# Patient Record
Sex: FEMALE | Race: WHITE | NOT HISPANIC OR LATINO
[De-identification: names, ages, dates, MRNs, and addresses within clinical notes are randomized per-mention and may not be internally consistent; named-entity substitution may affect disease eponyms.]

---

## 2020-10-11 ENCOUNTER — TRANSCRIPTION ENCOUNTER (OUTPATIENT)
Age: 45
End: 2020-10-11

## 2020-10-19 ENCOUNTER — APPOINTMENT (OUTPATIENT)
Dept: GASTROENTEROLOGY | Facility: CLINIC | Age: 45
End: 2020-10-19

## 2021-02-23 ENCOUNTER — APPOINTMENT (OUTPATIENT)
Dept: GASTROENTEROLOGY | Facility: CLINIC | Age: 46
End: 2021-02-23
Payer: MEDICARE

## 2021-02-23 VITALS
BODY MASS INDEX: 20.43 KG/M2 | HEIGHT: 62 IN | WEIGHT: 111 LBS | DIASTOLIC BLOOD PRESSURE: 70 MMHG | TEMPERATURE: 97 F | OXYGEN SATURATION: 98 % | HEART RATE: 78 BPM | SYSTOLIC BLOOD PRESSURE: 100 MMHG

## 2021-02-23 PROCEDURE — 99203 OFFICE O/P NEW LOW 30 MIN: CPT

## 2021-02-23 NOTE — ASSESSMENT
[FreeTextEntry1] : 45F with Down syndrome living in a group home, celiac disease complicated by duodenal stricture s/p gastrojejunostomy in 1997.\par Advised that the least invasive next step would be an upper endoscopy with steroid injection and metal stent placement, as well as repeat biopsies of the small bowel. Discussed that this would not be a permanent solution but would hopefully provide relief of symptoms for a longer interval. If this fails, patient may ultimately require surgery.

## 2021-02-23 NOTE — PHYSICAL EXAM
[General Appearance - Alert] : alert [General Appearance - In No Acute Distress] : in no acute distress [Sclera] : the sclera and conjunctiva were normal [Extraocular Movements] : extraocular movements were intact [Outer Ear] : the ears and nose were normal in appearance [Oropharynx] : the oropharynx was normal [Neck Appearance] : the appearance of the neck was normal [] : no respiratory distress [Respiration, Rhythm And Depth] : normal respiratory rhythm and effort [Exaggerated Use Of Accessory Muscles For Inspiration] : no accessory muscle use [Heart Rate And Rhythm] : heart rate was normal and rhythm regular [Heart Sounds] : normal S1 and S2 [Bowel Sounds] : normal bowel sounds [Abdomen Soft] : soft [FreeTextEntry1] : tender to palpation in LLQ and RLQ [No CVA Tenderness] : no ~M costovertebral angle tenderness [No Spinal Tenderness] : no spinal tenderness [Abnormal Walk] : normal gait [Musculoskeletal - Swelling] : no joint swelling seen [Skin Color & Pigmentation] : normal skin color and pigmentation [Skin Turgor] : normal skin turgor [Oriented To Time, Place, And Person] : oriented to person, place, and time

## 2021-02-23 NOTE — HISTORY OF PRESENT ILLNESS
[de-identified] : 45F with Down syndrome living in a group home, celiac disease complicated by duodenal stricture s/p gastrojejunostomy in 1997 (required revision in 2001 and 2007) presents for a second opinion. Her father provides most of her medical history. Pt has had multiple GI physicians in the past but has been following with Dr. Lb Glass for the past 3 years. THe patient has required serial dilations about every 3 months but recently the dilations do not provide as much benefit or for as long. It is described that by the evening every night, the patient has bilateral abdominal pain which results in meal avoidance. As a result, she has lost about 30 pounds in the last year and a half. Her last dilation of the gastrojejunal anastomosis was about 6 weeks ago on 12/22/20.\par Patient follows a gluten free diet. She however still has a bowel movement just minutes after any meal, resulting in about 6-7 BMs daily.  She takes carafate before meals.\par Pt denies fever, chills, dysphagia, odynophagia, heartburn, regurgitation, diarrhea, constipation, or melena.

## 2021-03-02 ENCOUNTER — NON-APPOINTMENT (OUTPATIENT)
Age: 46
End: 2021-03-02

## 2021-03-18 ENCOUNTER — APPOINTMENT (OUTPATIENT)
Dept: GASTROENTEROLOGY | Facility: CLINIC | Age: 46
End: 2021-03-18
Payer: MEDICARE

## 2021-03-18 DIAGNOSIS — K31.1 ADULT HYPERTROPHIC PYLORIC STENOSIS: ICD-10-CM

## 2021-03-18 DIAGNOSIS — Z01.812 ENCOUNTER FOR PREPROCEDURAL LABORATORY EXAMINATION: ICD-10-CM

## 2021-03-18 PROCEDURE — 99213 OFFICE O/P EST LOW 20 MIN: CPT

## 2021-03-19 PROBLEM — Z01.812 PRE-PROCEDURE LAB EXAM: Status: ACTIVE | Noted: 2021-03-19

## 2021-03-26 ENCOUNTER — APPOINTMENT (OUTPATIENT)
Dept: DISASTER EMERGENCY | Facility: CLINIC | Age: 46
End: 2021-03-26

## 2021-03-26 ENCOUNTER — LABORATORY RESULT (OUTPATIENT)
Age: 46
End: 2021-03-26

## 2021-03-28 RX ORDER — SODIUM CHLORIDE 9 MG/ML
500 INJECTION INTRAMUSCULAR; INTRAVENOUS; SUBCUTANEOUS
Refills: 0 | Status: DISCONTINUED | OUTPATIENT
Start: 2021-03-29 | End: 2021-04-12

## 2021-03-29 ENCOUNTER — OUTPATIENT (OUTPATIENT)
Dept: OUTPATIENT SERVICES | Facility: HOSPITAL | Age: 46
LOS: 1 days | Discharge: HOME | End: 2021-03-29
Payer: MEDICARE

## 2021-03-29 ENCOUNTER — APPOINTMENT (OUTPATIENT)
Dept: GASTROENTEROLOGY | Facility: HOSPITAL | Age: 46
End: 2021-03-29

## 2021-03-29 VITALS
SYSTOLIC BLOOD PRESSURE: 95 MMHG | HEART RATE: 60 BPM | OXYGEN SATURATION: 97 % | HEIGHT: 62 IN | RESPIRATION RATE: 12 BRPM | DIASTOLIC BLOOD PRESSURE: 61 MMHG | TEMPERATURE: 98 F | WEIGHT: 108.03 LBS

## 2021-03-29 VITALS
OXYGEN SATURATION: 98 % | SYSTOLIC BLOOD PRESSURE: 95 MMHG | DIASTOLIC BLOOD PRESSURE: 54 MMHG | HEART RATE: 76 BPM | RESPIRATION RATE: 18 BRPM

## 2021-03-29 DIAGNOSIS — K90.0 CELIAC DISEASE: ICD-10-CM

## 2021-03-29 DIAGNOSIS — Z98.89 OTHER SPECIFIED POSTPROCEDURAL STATES: Chronic | ICD-10-CM

## 2021-03-29 DIAGNOSIS — Q21.1 ATRIAL SEPTAL DEFECT: Chronic | ICD-10-CM

## 2021-03-29 LAB — HCG UR QL: NEGATIVE — SIGNIFICANT CHANGE UP

## 2021-03-29 PROCEDURE — 43266 EGD ENDOSCOPIC STENT PLACE: CPT

## 2021-03-29 RX ORDER — SODIUM CHLORIDE 9 MG/ML
500 INJECTION, SOLUTION INTRAVENOUS
Refills: 0 | Status: COMPLETED | OUTPATIENT
Start: 2021-03-29 | End: 2021-03-29

## 2021-03-29 RX ADMIN — SODIUM CHLORIDE 30 MILLILITER(S): 9 INJECTION, SOLUTION INTRAVENOUS at 07:51

## 2021-03-29 NOTE — ASU PATIENT PROFILE, ADULT - PSH
ASD (atrial septal defect)  ASD repair Oct 2001  H/O hernia repair  2002, 2009 with mesh  History of colon surgery  revision, gastro jejunostomy and lysis of adhesions  History of colon surgery  duodeno [id number]  S/P tonsillectomy

## 2021-03-29 NOTE — ASU PATIENT PROFILE, ADULT - PMH
Acquired subluxation of left patella, sequela    Acute depression    Afferent loop syndrome    Anxiety    ASD (atrial septal defect)    Celiac disease    Cyst of ovary, unspecified laterality    Down syndrome    Duodenitis    Gastritis    Gilbert's syndrome    Gluten free diet    History of pseudoseizure    Hypothyroid    Iron deficiency anemia, unspecified iron deficiency anemia type    Lactose intolerance    Macrocytic anemia    MRSA (methicillin resistant Staphylococcus aureus)  MSSA/MRSA detected in Nose culture  Obsessive compulsive disorder    Sleep disturbance    Syncope, unspecified syncope type

## 2021-04-13 ENCOUNTER — NON-APPOINTMENT (OUTPATIENT)
Age: 46
End: 2021-04-13

## 2021-04-20 ENCOUNTER — APPOINTMENT (OUTPATIENT)
Dept: GASTROENTEROLOGY | Facility: HOSPITAL | Age: 46
End: 2021-04-20

## 2021-04-23 ENCOUNTER — NON-APPOINTMENT (OUTPATIENT)
Age: 46
End: 2021-04-23

## 2021-04-26 ENCOUNTER — NON-APPOINTMENT (OUTPATIENT)
Age: 46
End: 2021-04-26

## 2021-04-28 ENCOUNTER — APPOINTMENT (OUTPATIENT)
Dept: DISASTER EMERGENCY | Facility: CLINIC | Age: 46
End: 2021-04-28

## 2021-04-28 DIAGNOSIS — Z01.818 ENCOUNTER FOR OTHER PREPROCEDURAL EXAMINATION: ICD-10-CM

## 2021-04-28 LAB — SARS-COV-2 N GENE NPH QL NAA+PROBE: NOT DETECTED

## 2021-04-29 RX ORDER — CIPROFLOXACIN LACTATE 400MG/40ML
400 VIAL (ML) INTRAVENOUS ONCE
Refills: 0 | Status: DISCONTINUED | OUTPATIENT
Start: 2021-04-30 | End: 2021-05-14

## 2021-04-29 RX ORDER — INDOMETHACIN 50 MG
100 CAPSULE ORAL ONCE
Refills: 0 | Status: DISCONTINUED | OUTPATIENT
Start: 2021-04-30 | End: 2021-05-14

## 2021-04-29 RX ORDER — SODIUM CHLORIDE 9 MG/ML
1000 INJECTION, SOLUTION INTRAVENOUS ONCE
Refills: 0 | Status: DISCONTINUED | OUTPATIENT
Start: 2021-04-30 | End: 2021-05-14

## 2021-04-30 ENCOUNTER — RESULT REVIEW (OUTPATIENT)
Age: 46
End: 2021-04-30

## 2021-04-30 ENCOUNTER — OUTPATIENT (OUTPATIENT)
Dept: OUTPATIENT SERVICES | Facility: HOSPITAL | Age: 46
LOS: 1 days | Discharge: HOME | End: 2021-04-30
Payer: MEDICARE

## 2021-04-30 ENCOUNTER — APPOINTMENT (OUTPATIENT)
Dept: GASTROENTEROLOGY | Facility: HOSPITAL | Age: 46
End: 2021-04-30

## 2021-04-30 VITALS
HEART RATE: 71 BPM | RESPIRATION RATE: 18 BRPM | DIASTOLIC BLOOD PRESSURE: 57 MMHG | SYSTOLIC BLOOD PRESSURE: 92 MMHG | OXYGEN SATURATION: 100 %

## 2021-04-30 VITALS
TEMPERATURE: 99 F | DIASTOLIC BLOOD PRESSURE: 62 MMHG | RESPIRATION RATE: 16 BRPM | SYSTOLIC BLOOD PRESSURE: 93 MMHG | WEIGHT: 102.07 LBS | HEART RATE: 64 BPM | OXYGEN SATURATION: 100 % | HEIGHT: 62 IN

## 2021-04-30 DIAGNOSIS — Z98.89 OTHER SPECIFIED POSTPROCEDURAL STATES: Chronic | ICD-10-CM

## 2021-04-30 DIAGNOSIS — Q21.1 ATRIAL SEPTAL DEFECT: Chronic | ICD-10-CM

## 2021-04-30 DIAGNOSIS — R11.0 NAUSEA: ICD-10-CM

## 2021-04-30 LAB — HCG UR QL: NEGATIVE — SIGNIFICANT CHANGE UP

## 2021-04-30 PROCEDURE — 43239 EGD BIOPSY SINGLE/MULTIPLE: CPT | Mod: 59

## 2021-04-30 PROCEDURE — 43247 EGD REMOVE FOREIGN BODY: CPT

## 2021-04-30 PROCEDURE — 88305 TISSUE EXAM BY PATHOLOGIST: CPT | Mod: 26

## 2021-04-30 RX ORDER — SODIUM CHLORIDE 9 MG/ML
500 INJECTION, SOLUTION INTRAVENOUS
Refills: 0 | Status: COMPLETED | OUTPATIENT
Start: 2021-04-30 | End: 2021-04-30

## 2021-04-30 RX ADMIN — SODIUM CHLORIDE 30 MILLILITER(S): 9 INJECTION, SOLUTION INTRAVENOUS at 07:58

## 2021-04-30 NOTE — ASU PATIENT PROFILE, ADULT - PSH
ASD (atrial septal defect)  ASD repair Oct 2001  H/O hernia repair  2002, 2009 with mesh  History of colon surgery  revision, gastro jejunostomy and lysis of adhesions  History of colon surgery  duodeno [id number]  S/P tonsillectomy

## 2021-05-05 NOTE — ASSESSMENT
[FreeTextEntry1] : Plan for EGD to better understand anatomy\par Plan for stent if feasible\par It is not clear whether it would be best to revise the entire surgery\par This will depend on what we see.

## 2021-05-05 NOTE — HISTORY OF PRESENT ILLNESS
[Home] : at home, [unfilled] , at the time of the visit. [Medical Office: ([facility])___] : at the medical office located in  [Parents] : parents [Verbal consent obtained from patient] : the patient, [unfilled] [FreeTextEntry1] : 45 year old female with down syndrome and severe celiac disease who has undergone multiple gastric surgeries. Presumably this was done for possible GP. She had a loop GJ, which was then converted and she has at least two enteroenterostomies. These have been severely stenosed and required dilation - 30X over the last 2 years. \par She has been referred for a possible. stent. I have been reviewing anatomy from UGI series as well as with Dr Glass to better understand this patient's anatomical stenosis. This year she has been losing about 1 lbs weekly.

## 2021-05-06 LAB — SURGICAL PATHOLOGY STUDY: SIGNIFICANT CHANGE UP

## 2021-05-13 ENCOUNTER — APPOINTMENT (OUTPATIENT)
Dept: GASTROENTEROLOGY | Facility: CLINIC | Age: 46
End: 2021-05-13

## 2021-05-27 ENCOUNTER — APPOINTMENT (OUTPATIENT)
Dept: GASTROENTEROLOGY | Facility: CLINIC | Age: 46
End: 2021-05-27
Payer: MEDICARE

## 2021-05-27 VITALS
SYSTOLIC BLOOD PRESSURE: 100 MMHG | BODY MASS INDEX: 19.14 KG/M2 | RESPIRATION RATE: 14 BRPM | HEART RATE: 62 BPM | HEIGHT: 62 IN | DIASTOLIC BLOOD PRESSURE: 70 MMHG | TEMPERATURE: 98 F | WEIGHT: 104 LBS | OXYGEN SATURATION: 98 %

## 2021-05-27 DIAGNOSIS — K91.89 OTHER POSTPROCEDURAL COMPLICATIONS AND DISORDERS OF DIGESTIVE SYSTEM: ICD-10-CM

## 2021-05-27 PROCEDURE — 99214 OFFICE O/P EST MOD 30 MIN: CPT

## 2021-06-24 NOTE — ASSESSMENT
[FreeTextEntry1] : Patient had stent removed for complex post surgical stricturing\par Patient did not tolerate stent well because of diarrhea which responded to Imodium\par Biopsies of small bowel show very active celiac disease\par i discussed these results with the father today because this is likely the cause of the diarrhea and not the stent\par I advised that the weight loss and her symptoms are due to many factors but that the celiac needs to be dealt with better\par They will see Dr. farnaz moya with whom they have a relationship established

## 2021-06-24 NOTE — HISTORY OF PRESENT ILLNESS
[FreeTextEntry1] : 45 year old female with down syndrome and severe celiac disease who has undergone multiple gastric surgeries. Presumably this was done for possible GP. She had a loop GJ, which was then converted and she has at least two enteroenterostomies. These have been severely stenosed and required dilation - 30X over the last 2 years. \par Underwent stent for complex surgical stricturing\par patient then complained that while she could eat she was having diarrhea\par Recently decision made to remove stent and biopsies taken of distal small bowel since patient was acting as if she had malabsorption\par Biopsies show active celiac disease despite father saying he is watching her diet carefully\par

## 2021-06-24 NOTE — PHYSICAL EXAM
[General Appearance - Alert] : alert [General Appearance - In No Acute Distress] : in no acute distress [General Appearance - Well-Appearing] : healthy appearing [FreeTextEntry1] : features f down syndrome [Sclera] : the sclera and conjunctiva were normal [Neck Appearance] : the appearance of the neck was normal [Exaggerated Use Of Accessory Muscles For Inspiration] : no accessory muscle use [Apical Impulse] : the apical impulse was normal [Heart Sounds] : normal S1 and S2 [Bowel Sounds] : normal bowel sounds [Abdomen Soft] : soft [Cervical Lymph Nodes Enlarged Posterior Bilaterally] : posterior cervical [Cervical Lymph Nodes Enlarged Anterior Bilaterally] : anterior cervical [No CVA Tenderness] : no ~M costovertebral angle tenderness [No Spinal Tenderness] : no spinal tenderness [Nail Clubbing] : no clubbing  or cyanosis of the fingernails [] : no rash [No Focal Deficits] : no focal deficits [Oriented To Time, Place, And Person] : oriented to person, place, and time [Impaired Insight] : insight and judgment were intact [Affect] : the affect was normal

## 2021-08-11 ENCOUNTER — APPOINTMENT (OUTPATIENT)
Dept: GASTROENTEROLOGY | Facility: CLINIC | Age: 46
End: 2021-08-11
Payer: MEDICARE

## 2021-08-11 ENCOUNTER — LABORATORY RESULT (OUTPATIENT)
Age: 46
End: 2021-08-11

## 2021-08-11 VITALS
HEIGHT: 62 IN | OXYGEN SATURATION: 98 % | WEIGHT: 114 LBS | HEART RATE: 66 BPM | TEMPERATURE: 96.6 F | DIASTOLIC BLOOD PRESSURE: 60 MMHG | BODY MASS INDEX: 20.98 KG/M2 | SYSTOLIC BLOOD PRESSURE: 90 MMHG

## 2021-08-11 DIAGNOSIS — K91.30 OTHER POSTPROCEDURAL COMPLICATIONS AND DISORDERS OF DIGESTIVE SYSTEM: ICD-10-CM

## 2021-08-11 DIAGNOSIS — R10.9 UNSPECIFIED ABDOMINAL PAIN: ICD-10-CM

## 2021-08-11 DIAGNOSIS — K91.89 OTHER POSTPROCEDURAL COMPLICATIONS AND DISORDERS OF DIGESTIVE SYSTEM: ICD-10-CM

## 2021-08-11 PROCEDURE — 99215 OFFICE O/P EST HI 40 MIN: CPT | Mod: 25

## 2021-08-11 PROCEDURE — 99205 OFFICE O/P NEW HI 60 MIN: CPT | Mod: 25

## 2021-08-11 PROCEDURE — 36415 COLL VENOUS BLD VENIPUNCTURE: CPT

## 2021-08-11 RX ORDER — MULTIVIT-MIN/IRON/FOLIC ACID/K 18-600-40
CAPSULE ORAL
Refills: 0 | Status: ACTIVE | COMMUNITY

## 2021-08-11 RX ORDER — MIDODRINE HYDROCHLORIDE 10 MG/1
10 TABLET ORAL
Refills: 0 | Status: ACTIVE | COMMUNITY

## 2021-08-11 RX ORDER — SUCRALFATE 1 G/10ML
1 SUSPENSION ORAL 3 TIMES DAILY
Qty: 3 | Refills: 1 | Status: DISCONTINUED | COMMUNITY
Start: 2021-04-13 | End: 2021-08-11

## 2021-08-12 NOTE — ASSESSMENT
[FreeTextEntry1] : Patient with Down syndrome and a diagnosis of celiac disease made 15 years ago.  Recent pathology showed moderate villous blunting with slightly increased intraepithelial lymphocytes and increased inflammatory cells in the lamina propria.  Although efforts are made for the patient to be on a strict gluten-free diet, there does appear to be significant risk of contact and cross-contamination at the patient's group home.She has had multiple gastric surgeries and repeated dilatations of a gastrojejunostomy stricture.\par \par I had a long discussion with the patient and her father regarding celiac disease, the gluten-free diet, the concepts of contact and cross contamination, and the potential complications of celiac disease. We also discussed the need for ongoing followup.\par \par Bloodwork was sent for CBC, chem-pack, TSH, celiac markers, iron studies, B12, folate, vitamin A, vitamin D, vitamin K, magnesium, carotene, vitamin B6, zinc, and celiac HLA testing.\par \par Patient was sent for a bone density scan.\par \par I have provided the patient with Zegerid 20 mg twice daily and Carafate 10 cc 4 times daily given the recent finding of a small ulcer at the surgical anastomosis.\par \par I explained to the patient that I do not have expertise when it comes to dilatation and stenting or advanced endoscopy.  I deferred management decisions and intervention regarding this to Dr. Guadarrama.

## 2021-08-12 NOTE — CONSULT LETTER
[FreeTextEntry1] : Dear Dr. Sue Aguilar,\par \[facility] I had the pleasure of seeing your patient RICARDO FORTUNE in the office today.  My office note is attached. PLEASE READ THE "ASSESSMENT" SECTION OF THE NOTE TO SEE MY IMPRESSION AND PLAN.\par \par Thank you very much for allowing me to participate in the care of your patient.\par \[facility] Sincerely,\[facility] \par Robin Colorado M.D., FAC, [facility] Director, Celiac Program at [facility]  of Medicine\[facility] [employer] at [facility] Practice Director,\[facility] - Gastroenterology/Internal Medicine at [address]\[place] [address]\[facility] Tel: [phone]\[facility] Email: [email or web address] \[facility] \[facility] The attached note has been created using a voice recognition system (Dragon).  There may be some misspellings and typos.  Please call my office if you have any issues or questions.

## 2021-08-12 NOTE — HISTORY OF PRESENT ILLNESS
[FreeTextEntry1] : The patient is a 45-year-old woman with Down syndrome who was seen with her father.  She was diagnosed with celiac disease 15 years ago by Dr. Noel.  I do not have the initial studies at which time the diagnosis was made.  The patient had a duodenal stricture which required gastrojejunostomy in 1997.  This has been revised on 2 occasions the patient has had recurrent stricturing at the gastrojejunal anastomosis for which she has undergone revealed dilatations approximately every 3 months.  She has seen multiple gastroenterologists for this.  Most recently, she had a stent placed by Dr. March in March but this was removed on April 30, 2021 due to diarrhea.  At the time of that endoscopy, small bowel biopsies were taken which showed moderate villous blunting with increased inflammatory cells in the lamina propria and slightly increased intraepithelial lymphocytes.  The patient has subsequently been seen by Dr. Guadarrama at [facility], who performed an enteroscopy on July 8, 2021 with dilatation.  At that time note was made of scalloped and atrophic appearing villi in the small bowel and an area of ulceration in the gastric body.  The patient was hospitalized on July 14, 2021 for hematemesis.  Another EGD was performed on July 15, 2021 which showed a 3 mm clean-based ulceration in the beginning of the gastrojejunostomy.  The patient is currently on Zegerid 20 mg twice daily.  She was on Carafate but this has been stopped. Dr. Guadarrama has recommended endoscopic gastrojejunostomy as an alternative to recurrent dilatations.\par \par As far as the celiac disease goes, no blood work has been done in some time.  The patient lives in a group home where they are aware of the need for a gluten-free diet but it does appear that there is significant room for contact and cross-contamination.  The patient spends a good deal of time at her father's house, where he reports a strict gluten-free diet.  The patient's mother has passed away.  She complains of right sided abdominal pain.  She has recently gained weight and is not currently vomiting although she vomits frequently as she develops obstruction.  The patient has 4 solid bowel movements a day.  She denies melena or bright red blood per rectum.

## 2021-08-12 NOTE — REASON FOR VISIT
[Initial Evaluation] : an initial evaluation [Parent] : parent [FreeTextEntry1] : Celiac disease, abdominal pain, gastric ulcer, small bowel anastomotic stricture

## 2021-08-12 NOTE — PHYSICAL EXAM
[General Appearance - Alert] : alert [General Appearance - In No Acute Distress] : in no acute distress [Neck Appearance] : the appearance of the neck was normal [Neck Cervical Mass (___cm)] : no neck mass was observed [Jugular Venous Distention Increased] : there was no jugular-venous distention [Thyroid Nodule] : there were no palpable thyroid nodules [Thyroid Diffuse Enlargement] : the thyroid was not enlarged [Auscultation Breath Sounds / Voice Sounds] : lungs were clear to auscultation bilaterally [Heart Rate And Rhythm] : heart rate was normal and rhythm regular [Heart Sounds] : normal S1 and S2 [Heart Sounds Gallop] : no gallops [Murmurs] : no murmurs [Heart Sounds Pericardial Friction Rub] : no pericardial rub [Edema] : there was no peripheral edema [Bowel Sounds] : normal bowel sounds [Abdomen Soft] : soft [] : no hepato-splenomegaly [Abdomen Mass (___ Cm)] : no abdominal mass palpated [No CVA Tenderness] : no ~M costovertebral angle tenderness [No Spinal Tenderness] : no spinal tenderness [Oriented To Time, Place, And Person] : oriented to person, place, and time [Impaired Insight] : insight and judgment were intact [Affect] : the affect was normal [FreeTextEntry1] : mild diffuse tenderness

## 2021-08-18 ENCOUNTER — NON-APPOINTMENT (OUTPATIENT)
Age: 46
End: 2021-08-18

## 2021-08-18 LAB
25(OH)D3 SERPL-MCNC: 24.9 NG/ML
ALBUMIN SERPL ELPH-MCNC: 3.5 G/DL
ALP BLD-CCNC: 143 U/L
ALT SERPL-CCNC: 33 U/L
ANION GAP SERPL CALC-SCNC: 10 MMOL/L
AST SERPL-CCNC: 35 U/L
BASOPHILS # BLD AUTO: 0.05 K/UL
BASOPHILS NFR BLD AUTO: 2.4 %
BILIRUB SERPL-MCNC: 0.5 MG/DL
BUN SERPL-MCNC: 9 MG/DL
CALCIUM SERPL-MCNC: 8.5 MG/DL
CHLORIDE SERPL-SCNC: 104 MMOL/L
CO2 SERPL-SCNC: 30 MMOL/L
CREAT SERPL-MCNC: 0.61 MG/DL
ENDOMYSIUM IGA SER QL: NEGATIVE
ENDOMYSIUM IGA TITR SER: NORMAL
EOSINOPHIL # BLD AUTO: 0.08 K/UL
EOSINOPHIL NFR BLD AUTO: 3.8 %
FERRITIN SERPL-MCNC: 33 NG/ML
FOLATE SERPL-MCNC: >20 NG/ML
GGT SERPL-CCNC: 8 U/L
GLIADIN IGA SER QL: 71.7 UNITS
GLIADIN IGG SER QL: <5 UNITS
GLIADIN PEPTIDE IGA SER-ACNC: POSITIVE
GLIADIN PEPTIDE IGG SER-ACNC: NEGATIVE
GLUCOSE SERPL-MCNC: 90 MG/DL
HCT VFR BLD CALC: 36.2 %
HGB BLD-MCNC: 11 G/DL
IGA SER QL IEP: 886 MG/DL
IMM GRANULOCYTES NFR BLD AUTO: 0.5 %
IRON SATN MFR SERPL: 8 %
IRON SERPL-MCNC: 29 UG/DL
LYMPHOCYTES # BLD AUTO: 1.01 K/UL
LYMPHOCYTES NFR BLD AUTO: 48.1 %
MAGNESIUM SERPL-MCNC: 2.3 MG/DL
MAN DIFF?: NORMAL
MCHC RBC-ENTMCNC: 30.4 GM/DL
MCHC RBC-ENTMCNC: 33.7 PG
MCV RBC AUTO: 111 FL
MENADIONE SERPL-MCNC: 0.1 NG/ML
MONOCYTES # BLD AUTO: 0.38 K/UL
MONOCYTES NFR BLD AUTO: 18.1 %
NEUTROPHILS # BLD AUTO: 0.57 K/UL
NEUTROPHILS NFR BLD AUTO: 27.1 %
PLATELET # BLD AUTO: 287 K/UL
POTASSIUM SERPL-SCNC: 3.3 MMOL/L
PROT SERPL-MCNC: 6.6 G/DL
RBC # BLD: 3.26 M/UL
RBC # FLD: 14 %
SODIUM SERPL-SCNC: 145 MMOL/L
TIBC SERPL-MCNC: 382 UG/DL
TSH SERPL-ACNC: 26 UIU/ML
TTG IGA SER IA-ACNC: 9.1 U/ML
TTG IGA SER-ACNC: ABNORMAL
TTG IGG SER IA-ACNC: 1.2 U/ML
TTG IGG SER IA-ACNC: NEGATIVE
UIBC SERPL-MCNC: 352 UG/DL
VIT A SERPL-MCNC: 24.4 UG/DL
VIT B12 SERPL-MCNC: 381 PG/ML
VIT B6 SERPL-MCNC: 68.5 UG/L
WBC # FLD AUTO: 2.1 K/UL
ZINC SERPL-MCNC: 55 UG/DL

## 2021-08-23 LAB — BETA CAROTENE: 2 UG/DL

## 2021-08-24 ENCOUNTER — NON-APPOINTMENT (OUTPATIENT)
Age: 46
End: 2021-08-24

## 2021-08-27 RX ORDER — CHOLECALCIFEROL (VITAMIN D3) 25 MCG
25 MCG TABLET ORAL DAILY
Qty: 90 | Refills: 0 | Status: ACTIVE | COMMUNITY
Start: 2021-08-27 | End: 1900-01-01

## 2021-08-30 ENCOUNTER — NON-APPOINTMENT (OUTPATIENT)
Age: 46
End: 2021-08-30

## 2021-10-08 ENCOUNTER — NON-APPOINTMENT (OUTPATIENT)
Age: 46
End: 2021-10-08

## 2021-11-18 ENCOUNTER — NON-APPOINTMENT (OUTPATIENT)
Age: 46
End: 2021-11-18

## 2022-01-04 ENCOUNTER — NON-APPOINTMENT (OUTPATIENT)
Age: 47
End: 2022-01-04

## 2022-03-18 ENCOUNTER — NON-APPOINTMENT (OUTPATIENT)
Age: 47
End: 2022-03-18

## 2022-03-29 RX ORDER — ELECTROLYTES/DEXTROSE
SOLUTION, ORAL ORAL
Qty: 2 | Refills: 3 | Status: ACTIVE | COMMUNITY
Start: 2022-03-29 | End: 1900-01-01

## 2022-04-27 ENCOUNTER — RX RENEWAL (OUTPATIENT)
Age: 47
End: 2022-04-27

## 2022-05-03 ENCOUNTER — NON-APPOINTMENT (OUTPATIENT)
Age: 47
End: 2022-05-03

## 2022-05-23 ENCOUNTER — APPOINTMENT (OUTPATIENT)
Dept: GASTROENTEROLOGY | Facility: CLINIC | Age: 47
End: 2022-05-23
Payer: MEDICARE

## 2022-06-10 ENCOUNTER — APPOINTMENT (OUTPATIENT)
Dept: GASTROENTEROLOGY | Facility: CLINIC | Age: 47
End: 2022-06-10
Payer: MEDICARE

## 2022-06-10 ENCOUNTER — LABORATORY RESULT (OUTPATIENT)
Age: 47
End: 2022-06-10

## 2022-06-10 VITALS
WEIGHT: 120 LBS | DIASTOLIC BLOOD PRESSURE: 60 MMHG | SYSTOLIC BLOOD PRESSURE: 90 MMHG | TEMPERATURE: 96.9 F | HEART RATE: 66 BPM | HEIGHT: 62 IN | OXYGEN SATURATION: 98 % | BODY MASS INDEX: 22.08 KG/M2

## 2022-06-10 DIAGNOSIS — K25.9 GASTRIC ULCER, UNSPECIFIED AS ACUTE OR CHRONIC, W/OUT HEMORRHAGE OR PERFORATION: ICD-10-CM

## 2022-06-10 DIAGNOSIS — R74.8 ABNORMAL LEVELS OF OTHER SERUM ENZYMES: ICD-10-CM

## 2022-06-10 PROCEDURE — 36415 COLL VENOUS BLD VENIPUNCTURE: CPT

## 2022-06-10 PROCEDURE — 99215 OFFICE O/P EST HI 40 MIN: CPT | Mod: 25

## 2022-06-10 RX ORDER — OMEPRAZOLE 40 MG/1
40 CAPSULE, DELAYED RELEASE ORAL
Refills: 0 | Status: ACTIVE | COMMUNITY

## 2022-06-10 RX ORDER — OMEPRAZOLE AND SODIUM BICARBONATE 20; 1100 MG/1; MG/1
20-1100 CAPSULE ORAL
Refills: 0 | Status: DISCONTINUED | COMMUNITY
End: 2022-06-10

## 2022-06-10 RX ORDER — PANTOPRAZOLE 40 MG/1
40 TABLET, DELAYED RELEASE ORAL
Qty: 90 | Refills: 1 | Status: DISCONTINUED | COMMUNITY
Start: 2022-04-01 | End: 2022-06-10

## 2022-06-10 RX ORDER — OMEPRAZOLE, SODIUM BICARBONATE 20; 1100 MG/1; MG/1
20-1100 CAPSULE ORAL TWICE DAILY
Qty: 180 | Refills: 1 | Status: DISCONTINUED | COMMUNITY
Start: 2021-08-11 | End: 2022-06-10

## 2022-06-12 NOTE — HISTORY OF PRESENT ILLNESS
[FreeTextEntry1] : The patient has Down syndrome and celiac disease.  She lives in a group home where she is reportedly on a gluten-free diet.  She is seeing me for the first time since August 11, 2021.  At that time, blood work revealed abnormal celiac markers with tissue transglutaminase IgA weakly positive at 9.1 with normal IgG and positive anti-deamidated gliadin IgA of 71.7.  The patient had borderline iron deficiency anemia, elevated alkaline phosphatase, decreased vitamin D, B12, and carotene levels.  After this visit, the patient saw my dietitian and efforts were made to ensure strict gluten-free diet at her group home.\par \par Since then, the patient was hospitalized at [facility] from April 26 to May 2 with hematemesis and abdominal pain.  She had 3 endoscopies by Dr. Elliott Peng and received blood transfusions.  She was found to have gastric and duodenal ulcers as well as stenosis of the gastroenterostomy in the gastric body which was dilated to 18 mm.  There appeared to be a stenosis of the jejunal jejunal anastomosis.  It is unclear if small bowel biopsies were taken during these procedures.\par \par The patient is currently on omeprazole 40 mg twice daily and Carafate twice daily as well as B12, vitamin D, and beta-carotene supplementation.  She gets pain in the abdomen bilaterally but does not vomit.  She has 4 solid bowel movements a day without melena.  She had a small amount of blood on 1 occasion which was likely related to hemorrhoids.\par \par \par Note from 8/11/2021 - The patient is a 45-year-old woman with Down syndrome who was seen with her father.  She was diagnosed with celiac disease 15 years ago by Dr. Noel.  I do not have the initial studies at which time the diagnosis was made.  The patient had a duodenal stricture which required gastrojejunostomy in 1997.  This has been revised on 2 occasions the patient has had recurrent stricturing at the gastrojejunal anastomosis for which she has undergone revealed dilatations approximately every 3 months.  She has seen multiple gastroenterologists for this.  Most recently, she had a stent placed by Dr. March in March but this was removed on April 30, 2021 due to diarrhea.  At the time of that endoscopy, small bowel biopsies were taken which showed moderate villous blunting with increased inflammatory cells in the lamina propria and slightly increased intraepithelial lymphocytes.  The patient has subsequently been seen by Dr. Guadarrama at [facility], who performed an enteroscopy on July 8, 2021 with dilatation.  At that time note was made of scalloped and atrophic appearing villi in the small bowel and an area of ulceration in the gastric body.  The patient was hospitalized on July 14, 2021 for hematemesis.  Another EGD was performed on July 15, 2021 which showed a 3 mm clean-based ulceration in the beginning of the gastrojejunostomy.  The patient is currently on Zegerid 20 mg twice daily.  She was on Carafate but this has been stopped. Dr. Guadarrama has recommended endoscopic gastrojejunostomy as an alternative to recurrent dilatations.\par \par As far as the celiac disease goes, no blood work has been done in some time.  The patient lives in a group home where they are aware of the need for a gluten-free diet but it does appear that there is significant room for contact and cross-contamination.  The patient spends a good deal of time at her father's house, where he reports a strict gluten-free diet.  The patient's mother has passed away.  She complains of right sided abdominal pain.  She has recently gained weight and is not currently vomiting although she vomits frequently as she develops obstruction.  The patient has 4 solid bowel movements a day.  She denies melena or bright red blood per rectum.

## 2022-06-12 NOTE — ASSESSMENT
[FreeTextEntry1] : Patient with Down syndrome and celiac disease who had mildly abnormal celiac serologies last summer.  She is status post hospitalization for GI bleed with gastric and duodenal ulcers.  She has a complicated history with multiple gastric surgeries and dilatations of strictures.  She had an elevated alkaline phosphatase last summer.  After the last visit, my dietitian and I made significant efforts to assure that the patient was provided with a gluten-free environment at her group home.\par \par Bloodwork was sent for CBC, chem-pack, TSH, celiac markers, iron studies, B12, folate, vitamin A, vitamin D, vitamin K, magnesium, carotene, vitamin B6, zinc, copper, vitamin B1, vitamin B2.  PT, alpha-1 antitrypsin, alpha-fetoprotein, KAVON, ANCA, ceruloplasmin, iron studies, GGTP, celiac markers, hepatitis A., B., C. serologies, lipid profile, AMA, anti-smooth muscle antibody, anti-LKM antibody, anti-soluble liver antigen antibody, and alkaline phosphatase isoenzymes.\par \par I am deferring endoscopic evaluations to Dr. Peng.  I have asked the patient to get me any biopsy results from the previous endoscopies and to ask that multiple small bowel biopsies be taken at the time of future endoscopy.\par \par \par Plan from 8/11/2021 - Patient with Down syndrome and a diagnosis of celiac disease made 15 years ago.  Recent pathology showed moderate villous blunting with slightly increased intraepithelial lymphocytes and increased inflammatory cells in the lamina propria.  Although efforts are made for the patient to be on a strict gluten-free diet, there does appear to be significant risk of contact and cross-contamination at the patient's group home.She has had multiple gastric surgeries and repeated dilatations of a gastrojejunostomy stricture.\par \par I had a long discussion with the patient and her father regarding celiac disease, the gluten-free diet, the concepts of contact and cross contamination, and the potential complications of celiac disease. We also discussed the need for ongoing followup.\par \par Bloodwork was sent for CBC, chem-pack, TSH, celiac markers, iron studies, B12, folate, vitamin A, vitamin D, vitamin K, magnesium, carotene, vitamin B6, zinc, and celiac HLA testing.\par \par Patient was sent for a bone density scan.\par \par I have provided the patient with Zegerid 20 mg twice daily and Carafate 10 cc 4 times daily given the recent finding of a small ulcer at the surgical anastomosis.\par \par I explained to the patient that I do not have expertise when it comes to dilatation and stenting or advanced endoscopy.  I deferred management decisions and intervention regarding this to Dr. Guadarrama.

## 2022-06-12 NOTE — PHYSICAL EXAM
[General Appearance - Alert] : alert [General Appearance - In No Acute Distress] : in no acute distress [Neck Appearance] : the appearance of the neck was normal [Neck Cervical Mass (___cm)] : no neck mass was observed [Jugular Venous Distention Increased] : there was no jugular-venous distention [Thyroid Diffuse Enlargement] : the thyroid was not enlarged [Thyroid Nodule] : there were no palpable thyroid nodules [Auscultation Breath Sounds / Voice Sounds] : lungs were clear to auscultation bilaterally [Heart Rate And Rhythm] : heart rate was normal and rhythm regular [Heart Sounds] : normal S1 and S2 [Heart Sounds Gallop] : no gallops [Murmurs] : no murmurs [Heart Sounds Pericardial Friction Rub] : no pericardial rub [Edema] : there was no peripheral edema [Bowel Sounds] : normal bowel sounds [Abdomen Soft] : soft [] : no hepato-splenomegaly [Abdomen Mass (___ Cm)] : no abdominal mass palpated [No CVA Tenderness] : no ~M costovertebral angle tenderness [No Spinal Tenderness] : no spinal tenderness [Oriented To Time, Place, And Person] : oriented to person, place, and time [Impaired Insight] : insight and judgment were intact [Affect] : the affect was normal [FreeTextEntry1] : mild diffuse tenderness

## 2022-06-12 NOTE — CONSULT LETTER
[FreeTextEntry1] : Dear Dr. Sue Aguilar and Dr. Elliott Peng,\par \par I had the pleasure of seeing your patient RICARDO FORTUNE in the office today.  My office note is attached. PLEASE READ THE "ASSESSMENT" SECTION OF THE NOTE TO SEE MY IMPRESSION AND PLAN.\par \par Thank you very much for allowing me to participate in the care of your patient.\par \par Sincerely,\par \par Robin Colorado M.D., FACG, FACP\par Director, Celiac Program at [facility]\par  of Medicine, [employer] at [facility] Adjunct  of Medicine, [facility] Practice Director, [facility] - Gastroenterology at [place]\[facility] [address]\[place] [address]\par Tel: [phone]\par Email: [email or web address]\par \par \par The attached note has been created using a voice recognition system (Dragon).  There may be some misspellings and typos.  Please call my office if you have any issues or questions.

## 2022-06-16 LAB
25(OH)D3 SERPL-MCNC: 22.5 NG/ML
A1AT SERPL-MCNC: 160 MG/DL
AFP-TM SERPL-MCNC: 3.8 NG/ML
ALBUMIN SERPL ELPH-MCNC: 4.1 G/DL
ALP BLD-CCNC: 138 U/L
ALP BLD-CCNC: 141 IU/L
ALP BONE CFR SERPL: 37 %
ALP INTEST CFR SERPL: 11 %
ALP LIVER CFR SERPL: 52 %
ALT SERPL-CCNC: 36 U/L
ANACR T: NEGATIVE
ANION GAP SERPL CALC-SCNC: 14 MMOL/L
AST SERPL-CCNC: 34 U/L
BASOPHILS # BLD AUTO: 0.04 K/UL
BASOPHILS NFR BLD AUTO: 1.5 %
BETA CAROTENE: 15 UG/DL
BILIRUB SERPL-MCNC: 0.6 MG/DL
BUN SERPL-MCNC: 19 MG/DL
CALCIUM SERPL-MCNC: 8.8 MG/DL
CERULOPLASMIN SERPL-MCNC: 9 MG/DL
CHLORIDE SERPL-SCNC: 101 MMOL/L
CHOLEST SERPL-MCNC: 149 MG/DL
CO2 SERPL-SCNC: 25 MMOL/L
COPPER SERPL-MCNC: 38 UG/DL
CREAT SERPL-MCNC: 0.8 MG/DL
EGFR: 92 ML/MIN/1.73M2
ENDOMYSIUM IGA SER QL: NEGATIVE
ENDOMYSIUM IGA TITR SER: NORMAL
EOSINOPHIL # BLD AUTO: 0.03 K/UL
EOSINOPHIL NFR BLD AUTO: 1.1 %
FERRITIN SERPL-MCNC: 33 NG/ML
FOLATE SERPL-MCNC: >20 NG/ML
GGT SERPL-CCNC: 8 U/L
GLIADIN IGA SER QL: 53.8 UNITS
GLIADIN IGG SER QL: <5 UNITS
GLIADIN PEPTIDE IGA SER-ACNC: POSITIVE
GLIADIN PEPTIDE IGG SER-ACNC: NEGATIVE
GLUCOSE SERPL-MCNC: 67 MG/DL
HBV CORE IGG+IGM SER QL: NONREACTIVE
HBV SURFACE AB SER QL: NONREACTIVE
HBV SURFACE AG SER QL: NONREACTIVE
HCT VFR BLD CALC: 40.9 %
HCV AB SER QL: NONREACTIVE
HCV S/CO RATIO: 0.21 S/CO
HDLC SERPL-MCNC: 66 MG/DL
HEPATITIS A IGG ANTIBODY: NONREACTIVE
HGB BLD-MCNC: 12.9 G/DL
IGA SER QL IEP: 1091 MG/DL
IGG SER QL IEP: 785 MG/DL
IMM GRANULOCYTES NFR BLD AUTO: 0 %
INR PPP: 0.94 RATIO
IRON SATN MFR SERPL: 12 %
IRON SERPL-MCNC: 52 UG/DL
LDLC SERPL CALC-MCNC: 63 MG/DL
LKM AB SER QL IF: <20.1 UNITS
LYMPHOCYTES # BLD AUTO: 0.8 K/UL
LYMPHOCYTES NFR BLD AUTO: 29.1 %
MAGNESIUM SERPL-MCNC: 2.2 MG/DL
MAN DIFF?: NORMAL
MCHC RBC-ENTMCNC: 31.5 GM/DL
MCHC RBC-ENTMCNC: 32.1 PG
MCV RBC AUTO: 101.7 FL
MITOCHONDRIA AB SER IF-ACNC: NORMAL
MONOCYTES # BLD AUTO: 0.32 K/UL
MONOCYTES NFR BLD AUTO: 11.6 %
NEUTROPHILS # BLD AUTO: 1.56 K/UL
NEUTROPHILS NFR BLD AUTO: 56.7 %
NONHDLC SERPL-MCNC: 83 MG/DL
PLATELET # BLD AUTO: 287 K/UL
POTASSIUM SERPL-SCNC: 4.5 MMOL/L
PROT SERPL-MCNC: 7.2 G/DL
PT BLD: 11.1 SEC
RBC # BLD: 4.02 M/UL
RBC # FLD: 17.9 %
SELENIUM SERPL-MCNC: 141 UG/L
SMOOTH MUSCLE AB SER QL IF: NORMAL
SODIUM SERPL-SCNC: 140 MMOL/L
SOLUBLE LIVER IGG SER IA-ACNC: 2.4
TIBC SERPL-MCNC: 427 UG/DL
TRIGL SERPL-MCNC: 98 MG/DL
TSH SERPL-ACNC: 46.3 UIU/ML
TTG IGA SER IA-ACNC: 9.3 U/ML
TTG IGA SER-ACNC: ABNORMAL
TTG IGG SER IA-ACNC: <1.2 U/ML
TTG IGG SER IA-ACNC: NEGATIVE
UIBC SERPL-MCNC: 376 UG/DL
VIT B1 SERPL-MCNC: 187.3 NMOL/L
VIT B12 SERPL-MCNC: 1433 PG/ML
WBC # FLD AUTO: 2.75 K/UL
ZINC SERPL-MCNC: 74 UG/DL

## 2022-06-17 LAB
VIT A SERPL-MCNC: 52.4 UG/DL
VIT B2 SERPL-MCNC: 382 UG/L

## 2022-06-20 LAB — VIT B6 SERPL-MCNC: 127.6 UG/L

## 2022-06-22 LAB — MENADIONE SERPL-MCNC: <0.1 NG/ML

## 2022-06-23 ENCOUNTER — NON-APPOINTMENT (OUTPATIENT)
Age: 47
End: 2022-06-23

## 2022-06-29 ENCOUNTER — NON-APPOINTMENT (OUTPATIENT)
Age: 47
End: 2022-06-29

## 2022-07-06 ENCOUNTER — NON-APPOINTMENT (OUTPATIENT)
Age: 47
End: 2022-07-06

## 2022-08-29 ENCOUNTER — NON-APPOINTMENT (OUTPATIENT)
Age: 47
End: 2022-08-29

## 2022-09-21 ENCOUNTER — NON-APPOINTMENT (OUTPATIENT)
Age: 47
End: 2022-09-21

## 2022-09-22 ENCOUNTER — LABORATORY RESULT (OUTPATIENT)
Age: 47
End: 2022-09-22

## 2022-10-05 ENCOUNTER — NON-APPOINTMENT (OUTPATIENT)
Age: 47
End: 2022-10-05

## 2022-10-05 LAB
25(OH)D3 SERPL-MCNC: 22.7 NG/ML
ALBUMIN SERPL ELPH-MCNC: 3.9 G/DL
ALP BLD-CCNC: 141 IU/L
ALP BLD-CCNC: 144 U/L
ALP BONE CFR SERPL: 31 %
ALP INTEST CFR SERPL: 10 %
ALP LIVER CFR SERPL: 59 %
ALT SERPL-CCNC: 47 U/L
ANION GAP SERPL CALC-SCNC: 13 MMOL/L
AST SERPL-CCNC: 40 U/L
BASOPHILS # BLD AUTO: 0.04 K/UL
BASOPHILS NFR BLD AUTO: 1.1 %
BETA CAROTENE: 4 UG/DL
BILIRUB SERPL-MCNC: 0.7 MG/DL
BUN SERPL-MCNC: 15 MG/DL
CALCIUM SERPL-MCNC: 8.7 MG/DL
CHLORIDE SERPL-SCNC: 107 MMOL/L
CO2 SERPL-SCNC: 25 MMOL/L
COPPER SERPL-MCNC: 99 UG/DL
CREAT SERPL-MCNC: 0.73 MG/DL
EGFR: 103 ML/MIN/1.73M2
ENDOMYSIUM IGA SER QL: NEGATIVE
ENDOMYSIUM IGA TITR SER: NORMAL
EOSINOPHIL # BLD AUTO: 0.05 K/UL
EOSINOPHIL NFR BLD AUTO: 1.4 %
FERRITIN SERPL-MCNC: 23 NG/ML
FOLATE SERPL-MCNC: >20 NG/ML
GGT SERPL-CCNC: 8 U/L
GLIADIN IGA SER QL: 76 UNITS
GLIADIN IGG SER QL: <5 UNITS
GLIADIN PEPTIDE IGA SER-ACNC: POSITIVE
GLIADIN PEPTIDE IGG SER-ACNC: NEGATIVE
GLUCOSE SERPL-MCNC: 72 MG/DL
HCT VFR BLD CALC: 41.4 %
HGB BLD-MCNC: 13.3 G/DL
IGA SER QL IEP: 926 MG/DL
IMM GRANULOCYTES NFR BLD AUTO: 0 %
IRON SATN MFR SERPL: 13 %
IRON SERPL-MCNC: 49 UG/DL
LYMPHOCYTES # BLD AUTO: 0.8 K/UL
LYMPHOCYTES NFR BLD AUTO: 22.6 %
MAGNESIUM SERPL-MCNC: 1.9 MG/DL
MAN DIFF?: NORMAL
MCHC RBC-ENTMCNC: 32.1 GM/DL
MCHC RBC-ENTMCNC: 34.9 PG
MCV RBC AUTO: 108.7 FL
MENADIONE SERPL-MCNC: <0.1 NG/ML
MONOCYTES # BLD AUTO: 0.4 K/UL
MONOCYTES NFR BLD AUTO: 11.3 %
NEUTROPHILS # BLD AUTO: 2.25 K/UL
NEUTROPHILS NFR BLD AUTO: 63.6 %
PLATELET # BLD AUTO: 258 K/UL
POTASSIUM SERPL-SCNC: 3.4 MMOL/L
PROT SERPL-MCNC: 6.8 G/DL
RBC # BLD: 3.81 M/UL
RBC # FLD: 16.7 %
SELENIUM SERPL-MCNC: 142 UG/L
SODIUM SERPL-SCNC: 145 MMOL/L
TIBC SERPL-MCNC: 378 UG/DL
TSH SERPL-ACNC: 34 UIU/ML
TTG IGA SER IA-ACNC: 13.6 U/ML
TTG IGA SER-ACNC: POSITIVE
TTG IGG SER IA-ACNC: <1.2 U/ML
TTG IGG SER IA-ACNC: NEGATIVE
UIBC SERPL-MCNC: 328 UG/DL
VIT A SERPL-MCNC: 28.7 UG/DL
VIT B1 SERPL-MCNC: 167.4 NMOL/L
VIT B12 SERPL-MCNC: 1635 PG/ML
VIT B2 SERPL-MCNC: 269 UG/L
VIT B6 SERPL-MCNC: 90.7 UG/L
WBC # FLD AUTO: 3.54 K/UL
ZINC SERPL-MCNC: 60 UG/DL

## 2022-12-22 ENCOUNTER — NON-APPOINTMENT (OUTPATIENT)
Age: 47
End: 2022-12-22

## 2023-01-11 ENCOUNTER — INPATIENT (INPATIENT)
Facility: HOSPITAL | Age: 48
LOS: 4 days | Discharge: NURSING, REHAB OR LONG-TERM CARE | DRG: 125 | End: 2023-01-16
Attending: STUDENT IN AN ORGANIZED HEALTH CARE EDUCATION/TRAINING PROGRAM | Admitting: STUDENT IN AN ORGANIZED HEALTH CARE EDUCATION/TRAINING PROGRAM
Payer: MEDICARE

## 2023-01-11 VITALS
OXYGEN SATURATION: 95 % | WEIGHT: 138.89 LBS | SYSTOLIC BLOOD PRESSURE: 95 MMHG | HEART RATE: 69 BPM | HEIGHT: 62 IN | TEMPERATURE: 99 F | RESPIRATION RATE: 18 BRPM | DIASTOLIC BLOOD PRESSURE: 64 MMHG

## 2023-01-11 DIAGNOSIS — Z98.89 OTHER SPECIFIED POSTPROCEDURAL STATES: Chronic | ICD-10-CM

## 2023-01-11 DIAGNOSIS — Q21.1 ATRIAL SEPTAL DEFECT: Chronic | ICD-10-CM

## 2023-01-11 LAB
ALBUMIN SERPL ELPH-MCNC: 3.3 G/DL — SIGNIFICANT CHANGE UP (ref 3.3–5)
ALP SERPL-CCNC: 157 U/L — HIGH (ref 40–120)
ALT FLD-CCNC: 32 U/L — SIGNIFICANT CHANGE UP (ref 10–45)
ANION GAP SERPL CALC-SCNC: 13 MMOL/L — SIGNIFICANT CHANGE UP (ref 5–17)
AST SERPL-CCNC: 28 U/L — SIGNIFICANT CHANGE UP (ref 10–40)
BASOPHILS # BLD AUTO: 0.03 K/UL — SIGNIFICANT CHANGE UP (ref 0–0.2)
BASOPHILS NFR BLD AUTO: 0.3 % — SIGNIFICANT CHANGE UP (ref 0–2)
BILIRUB SERPL-MCNC: 0.7 MG/DL — SIGNIFICANT CHANGE UP (ref 0.2–1.2)
BUN SERPL-MCNC: 15 MG/DL — SIGNIFICANT CHANGE UP (ref 7–23)
CALCIUM SERPL-MCNC: 9.4 MG/DL — SIGNIFICANT CHANGE UP (ref 8.4–10.5)
CHLORIDE SERPL-SCNC: 96 MMOL/L — SIGNIFICANT CHANGE UP (ref 96–108)
CO2 SERPL-SCNC: 30 MMOL/L — SIGNIFICANT CHANGE UP (ref 22–31)
CREAT SERPL-MCNC: 0.77 MG/DL — SIGNIFICANT CHANGE UP (ref 0.5–1.3)
EGFR: 96 ML/MIN/1.73M2 — SIGNIFICANT CHANGE UP
EOSINOPHIL # BLD AUTO: 0 K/UL — SIGNIFICANT CHANGE UP (ref 0–0.5)
EOSINOPHIL NFR BLD AUTO: 0 % — SIGNIFICANT CHANGE UP (ref 0–6)
FLUAV AG NPH QL: SIGNIFICANT CHANGE UP
FLUBV AG NPH QL: SIGNIFICANT CHANGE UP
GLUCOSE SERPL-MCNC: 125 MG/DL — HIGH (ref 70–99)
HCT VFR BLD CALC: 40.9 % — SIGNIFICANT CHANGE UP (ref 34.5–45)
HGB BLD-MCNC: 13.5 G/DL — SIGNIFICANT CHANGE UP (ref 11.5–15.5)
IMM GRANULOCYTES NFR BLD AUTO: 0.4 % — SIGNIFICANT CHANGE UP (ref 0–0.9)
LYMPHOCYTES # BLD AUTO: 1.03 K/UL — SIGNIFICANT CHANGE UP (ref 1–3.3)
LYMPHOCYTES # BLD AUTO: 11.5 % — LOW (ref 13–44)
MCHC RBC-ENTMCNC: 33 GM/DL — SIGNIFICANT CHANGE UP (ref 32–36)
MCHC RBC-ENTMCNC: 33.5 PG — SIGNIFICANT CHANGE UP (ref 27–34)
MCV RBC AUTO: 101.5 FL — HIGH (ref 80–100)
MONOCYTES # BLD AUTO: 1.06 K/UL — HIGH (ref 0–0.9)
MONOCYTES NFR BLD AUTO: 11.9 % — SIGNIFICANT CHANGE UP (ref 2–14)
NEUTROPHILS # BLD AUTO: 6.77 K/UL — SIGNIFICANT CHANGE UP (ref 1.8–7.4)
NEUTROPHILS NFR BLD AUTO: 75.9 % — SIGNIFICANT CHANGE UP (ref 43–77)
NRBC # BLD: 0 /100 WBCS — SIGNIFICANT CHANGE UP (ref 0–0)
PLATELET # BLD AUTO: 321 K/UL — SIGNIFICANT CHANGE UP (ref 150–400)
POTASSIUM SERPL-MCNC: 3.7 MMOL/L — SIGNIFICANT CHANGE UP (ref 3.5–5.3)
POTASSIUM SERPL-SCNC: 3.7 MMOL/L — SIGNIFICANT CHANGE UP (ref 3.5–5.3)
PROT SERPL-MCNC: 7.7 G/DL — SIGNIFICANT CHANGE UP (ref 6–8.3)
RBC # BLD: 4.03 M/UL — SIGNIFICANT CHANGE UP (ref 3.8–5.2)
RBC # FLD: 15.2 % — HIGH (ref 10.3–14.5)
RSV RNA NPH QL NAA+NON-PROBE: SIGNIFICANT CHANGE UP
SARS-COV-2 RNA SPEC QL NAA+PROBE: SIGNIFICANT CHANGE UP
SODIUM SERPL-SCNC: 139 MMOL/L — SIGNIFICANT CHANGE UP (ref 135–145)
WBC # BLD: 8.93 K/UL — SIGNIFICANT CHANGE UP (ref 3.8–10.5)
WBC # FLD AUTO: 8.93 K/UL — SIGNIFICANT CHANGE UP (ref 3.8–10.5)

## 2023-01-11 PROCEDURE — 71045 X-RAY EXAM CHEST 1 VIEW: CPT | Mod: 26

## 2023-01-11 PROCEDURE — 99285 EMERGENCY DEPT VISIT HI MDM: CPT | Mod: GC

## 2023-01-11 PROCEDURE — 70481 CT ORBIT/EAR/FOSSA W/DYE: CPT | Mod: 26,MA

## 2023-01-11 RX ORDER — AMPICILLIN SODIUM AND SULBACTAM SODIUM 250; 125 MG/ML; MG/ML
INJECTION, POWDER, FOR SUSPENSION INTRAMUSCULAR; INTRAVENOUS
Refills: 0 | Status: DISCONTINUED | OUTPATIENT
Start: 2023-01-11 | End: 2023-01-15

## 2023-01-11 RX ORDER — AMPICILLIN SODIUM AND SULBACTAM SODIUM 250; 125 MG/ML; MG/ML
3 INJECTION, POWDER, FOR SUSPENSION INTRAMUSCULAR; INTRAVENOUS EVERY 6 HOURS
Refills: 0 | Status: DISCONTINUED | OUTPATIENT
Start: 2023-01-12 | End: 2023-01-15

## 2023-01-11 RX ORDER — AMPICILLIN SODIUM AND SULBACTAM SODIUM 250; 125 MG/ML; MG/ML
3 INJECTION, POWDER, FOR SUSPENSION INTRAMUSCULAR; INTRAVENOUS ONCE
Refills: 0 | Status: COMPLETED | OUTPATIENT
Start: 2023-01-11 | End: 2023-01-11

## 2023-01-11 RX ORDER — ACETAMINOPHEN 500 MG
650 TABLET ORAL ONCE
Refills: 0 | Status: COMPLETED | OUTPATIENT
Start: 2023-01-11 | End: 2023-01-11

## 2023-01-11 RX ORDER — VANCOMYCIN HCL 1 G
1000 VIAL (EA) INTRAVENOUS ONCE
Refills: 0 | Status: COMPLETED | OUTPATIENT
Start: 2023-01-11 | End: 2023-01-11

## 2023-01-11 RX ADMIN — AMPICILLIN SODIUM AND SULBACTAM SODIUM 200 GRAM(S): 250; 125 INJECTION, POWDER, FOR SUSPENSION INTRAMUSCULAR; INTRAVENOUS at 23:01

## 2023-01-11 RX ADMIN — Medication 650 MILLIGRAM(S): at 23:01

## 2023-01-11 NOTE — ED PROVIDER NOTE - PHYSICAL EXAMINATION
GENERAL: Awake, alert, NAD  HEENT: Purulent green discharge of the right eye extraocular motions intact  ABDOMEN: Soft, , non tender, non distended, no rebound, no guarding  NEURO: A&Ox3. Moving all extremities.  SKIN: Warm and dry. No rash.  PSYCH: Normal affect.

## 2023-01-11 NOTE — ED PROVIDER NOTE - WR ORDER ID 1
From: Merlin Boros  To: Avelino Gallegos MD  Sent: 10/27/2020 1:10 PM CDT  Subject: Non-Urgent [facility] Dr. Murtaza Rene,    I may have been exposed to Covid.  Can I schedule a test? I tried to schedule at [facility] and [facility], but they don't hav
[id number]

## 2023-01-11 NOTE — CONSULT NOTE ADULT - SUBJECTIVE AND OBJECTIVE BOX
CC: Right Maxillary sinusitis.    HPI: 48 YO F with PMH of Downs syndrome, Duodenitis/Gastritis , Gilbert's syndrome, Hypothyroid, FE anemia, OCS, Ovarian cyst, ASD sp repair, Anxiety, Celiac dz presenting for Right  periorbital swelling of 2 days duration. History obtained from father at bedside. Noted swelling started 2 days ago, went to PCP and was prescribed Zpak. Swelling did not improve and pt went to see ophthalmologist Dr. Kayla Clinton today who diagnosed pt with preseptal cellulitis. ENT was consulted for evaluation of Right Maxillary sinus opacification found on CT Orbits. Pt reports runny nose, nasal congestion for past 2 weeks. Pt denies fever/ chills, cough, sore throat, facial pain/pressure, CP/SOB/Palpitations/Diaphoresis, HA/Dizziness/Blurry vision, fever/chills, Abdominal Pain/N/V/D, recent travel/sick contacts.     PAST MEDICAL & SURGICAL HISTORY:  Down syndrome  Celiac disease  Hypothyroid  ASD (atrial septal defect)  MRSA (methicillin resistant Staphylococcus aureus)  MSSA/MRSA detected in Nose culture  Lactose intolerance  Macrocytic anemia  Acquired subluxation of left patella, sequela  Gilbert&#x27;s syndrome  Iron deficiency anemia, unspecified iron deficiency anemia type  Cyst of ovary, unspecified laterality  Sleep disturbance  Obsessive compulsive disorder  History of pseudoseizure  Duodenitis  Syncope, unspecified syncope type  Gastritis  Afferent loop syndrome  Anxiety  Acute depression  Gluten free diet  S/P tonsillectomy  ASD (atrial septal defect)  ASD repair Oct 2001  H/O hernia repair  2002, 2009 with mesh  History of colon surgery  duodeno [id number]  History of colon surgery  revision, gastro jejunostomy and lysis of adhesions    Allergies  Celiac (Unknown)  No Known Drug Allergies    Intolerances    MEDICATIONS  (STANDING):  ampicillin/sulbactam  IVPB      ampicillin/sulbactam  IVPB 3 Gram(s) IV Intermittent every 6 hours  dexamethasone/neomycin/polymyxin Ointment 1 Application(s) Right EYE three times a day  dorzolamide 2%/timolol 0.5% Ophthalmic Solution 1 Drop(s) Right EYE every 12 hours    MEDICATIONS  (PRN):    Social History:     Family history:   Family history of Alzheimer&#x27;s disease (Mother)  No family history of COPD (Mother)    ROS:   ENT: all negative except as noted in HPI   CV: denies palpitations  Pulm: denies SOB, cough, hemoptysis  GI: denies change in apetite, indigestion, n/v  : denies pertinent urinary symptoms, urgency  Neuro: denies numbness/tingling, loss of sensation  Psych: denies anxiety  MS: denies muscle weakness, instability  Heme: denies easy bruising or bleeding  Endo: denies heat/cold intolerance, excessive sweating  Vascular: denies LE edema    Vital Signs Last 24 Hrs  T(C): 36.5 (12 Jan 2023 01:13), Max: 37.2 (11 Jan 2023 17:36)  T(F): 97.7 (12 Jan 2023 01:13), Max: 99 (11 Jan 2023 17:36)  HR: 94 (12 Jan 2023 01:13) (69 - 94)  BP: 115/66 (12 Jan 2023 01:13) (95/64 - 115/66)  BP(mean): --  RR: 20 (12 Jan 2023 01:13) (18 - 21)  SpO2: 94% (12 Jan 2023 01:13) (94% - 100%)  Parameters below as of 12 Jan 2023 01:13  Patient On (Oxygen Delivery Method): room air                        13.5   8.93  )-----------( 321      ( 11 Jan 2023 19:57 )             40.9    01-11    139  |  96  |  15  ----------------------------<  125<H>  3.7   |  30  |  0.77    Ca    9.4      11 Jan 2023 19:57  TPro  7.7  /  Alb  3.3  /  TBili  0.7  /  DBili  x   /  AST  28  /  ALT  32  /  AlkPhos  157<H>  01-11       PHYSICAL EXAM:  Gen: NAD, On RA.   Skin: No rashes, bruises, or lesions  Head: Normocephalic, Atraumatic  Face: no edema, erythema, or fluctuance. Parotid glands soft without mass  Eyes: Right periorbita cellulitis.   Nose: Nares bilaterally patent, no discharge  Mouth: No Stridor / Drooling / Trismus.  Mucosa moist, tongue/uvula midline, oropharynx clear  Neck: Flat, supple, no lymphadenopathy, trachea midline, no masses  Lymphatic: No lymphadenopathy  Resp: breathing easily, no stridor  CV: no peripheral edema/cyanosis  GI: nondistended   Peripheral vascular: no JVD or edema  Neuro: facial nerve intact, no facial droop    Diagnostic Nasal Endoscopy  Indication for procedure: Right Maxillary sinusitis.    "Anterior rhinoscopy insufficient to account for symptoms"    Verbal and/or written consent obtained from patient    Scope #3: flexible fiber optic telescope used with surgilube     Right Anterior nare nasal crusting. Purulent discharge, culture taken. Mucosa moist with scant mucus, normal inferior/middle/superior turbinates, normal inferior/middle/superior meatus, normal fontanelles, maxillary ostia clear, sphenoethmoidal recess clear bilaterally. No polyps noted.     IMAGING/ADDITIONAL STUDIES:   CT Orbits.     OPTIC NERVES: Normal.  LACRIMAL GLANDS:  Normal.  EXTRAOCULAR MUSCLES:  Normal.  VISUALIZED INTRACRANIAL STRUCTURES:   Normal.  VISUALIZED SINUSES:   The right maxillary sinus is opacified. There is   mucosal thickening and large fluid level in the left maxillary sinus.   There is opacification of several ethmoid air cells.  BONES:  Normal.  MISCELLANEOUS:  There is a periapical lucency involving the left   maxillary second molar with associated cortical dehiscence. No overlying   discrete fluid collection.    IMPRESSION: Right periorbital cellulitis with post septal extension as   described above. There is associated mild right nasopharyngeal. No   discrete fluid collection.    There is acute maxillary sinusitis. CC: Right Maxillary sinusitis.    HPI: 48 YO F with PMH of Downs syndrome, Duodenitis/Gastritis , Gilbert's syndrome, Hypothyroid, FE anemia, OCS, Ovarian cyst, ASD sp repair, Anxiety, Celiac dz presenting for Right  periorbital swelling of 2 days duration. History obtained from father at bedside. Noted swelling started 2 days ago, went to PCP and was prescribed Zpak. Swelling did not improve and pt went to see ophthalmologist Dr. Kayla Clinton today who diagnosed pt with preseptal cellulitis. ENT was consulted for evaluation of Right Maxillary sinus opacification found on CT Orbits. Pt reports runny nose, nasal congestion for past 2 weeks. Pt denies fever/ chills, cough, sore throat, facial pain/pressure, CP/SOB/Palpitations/Diaphoresis, HA/Dizziness/Blurry vision, fever/chills, Abdominal Pain/N/V/D, recent travel/sick contacts.     PAST MEDICAL & SURGICAL HISTORY:  Down syndrome  Celiac disease  Hypothyroid  ASD (atrial septal defect)  MRSA (methicillin resistant Staphylococcus aureus)  MSSA/MRSA detected in Nose culture  Lactose intolerance  Macrocytic anemia  Acquired subluxation of left patella, sequela  Gilbert&#x27;s syndrome  Iron deficiency anemia, unspecified iron deficiency anemia type  Cyst of ovary, unspecified laterality  Sleep disturbance  Obsessive compulsive disorder  History of pseudoseizure  Duodenitis  Syncope, unspecified syncope type  Gastritis  Afferent loop syndrome  Anxiety  Acute depression  Gluten free diet  S/P tonsillectomy  ASD (atrial septal defect)  ASD repair Oct 2001  H/O hernia repair  2002, 2009 with mesh  History of colon surgery  duodeno [id number]  History of colon surgery  revision, gastro jejunostomy and lysis of adhesions    Allergies  Celiac (Unknown)  No Known Drug Allergies    Intolerances    MEDICATIONS  (STANDING):  ampicillin/sulbactam  IVPB      ampicillin/sulbactam  IVPB 3 Gram(s) IV Intermittent every 6 hours  dexamethasone/neomycin/polymyxin Ointment 1 Application(s) Right EYE three times a day  dorzolamide 2%/timolol 0.5% Ophthalmic Solution 1 Drop(s) Right EYE every 12 hours    MEDICATIONS  (PRN):    Social History:     Family history:   Family history of Alzheimer&#x27;s disease (Mother)  No family history of COPD (Mother)    ROS:   ENT: all negative except as noted in HPI   CV: denies palpitations  Pulm: denies SOB, cough, hemoptysis  GI: denies change in apetite, indigestion, n/v  : denies pertinent urinary symptoms, urgency  Neuro: denies numbness/tingling, loss of sensation  Psych: denies anxiety  MS: denies muscle weakness, instability  Heme: denies easy bruising or bleeding  Endo: denies heat/cold intolerance, excessive sweating  Vascular: denies LE edema    Vital Signs Last 24 Hrs  T(C): 36.5 (12 Jan 2023 01:13), Max: 37.2 (11 Jan 2023 17:36)  T(F): 97.7 (12 Jan 2023 01:13), Max: 99 (11 Jan 2023 17:36)  HR: 94 (12 Jan 2023 01:13) (69 - 94)  BP: 115/66 (12 Jan 2023 01:13) (95/64 - 115/66)  BP(mean): --  RR: 20 (12 Jan 2023 01:13) (18 - 21)  SpO2: 94% (12 Jan 2023 01:13) (94% - 100%)  Parameters below as of 12 Jan 2023 01:13  Patient On (Oxygen Delivery Method): room air                        13.5   8.93  )-----------( 321      ( 11 Jan 2023 19:57 )             40.9    01-11    139  |  96  |  15  ----------------------------<  125<H>  3.7   |  30  |  0.77    Ca    9.4      11 Jan 2023 19:57  TPro  7.7  /  Alb  3.3  /  TBili  0.7  /  DBili  x   /  AST  28  /  ALT  32  /  AlkPhos  157<H>  01-11       PHYSICAL EXAM:  Gen: NAD, On RA.   Skin: No rashes, bruises, or lesions  Head: Normocephalic, Atraumatic  Face: no edema, erythema, or fluctuance. Parotid glands soft without mass  Eyes: Right periorbita erythema/edema, unable to open eye spontaneously.   Nose: Nares bilaterally patent, no discharge  Mouth: No Stridor / Drooling / Trismus.  Mucosa moist, tongue/uvula midline, oropharynx clear  Neck: Flat, supple, no lymphadenopathy, trachea midline, no masses  Lymphatic: No lymphadenopathy  Resp: breathing easily, no stridor  CV: no peripheral edema/cyanosis  GI: nondistended   Peripheral vascular: no JVD or edema  Neuro: facial nerve intact, no facial droop    Diagnostic Nasal Endoscopy  Indication for procedure: Right Maxillary sinusitis.    "Anterior rhinoscopy insufficient to account for symptoms"    Verbal and/or written consent obtained from patient    Scope #3: flexible fiber optic telescope used with surgilube     Right Anterior nare nasal crusting. ? Purulent discharge, culture taken. Mucosa moist with scant mucus, normal inferior/middle/superior turbinates, normal inferior/middle/superior meatus, normal fontanelles, maxillary ostia clear, sphenoethmoidal recess clear bilaterally. No polyps noted.     IMAGING/ADDITIONAL STUDIES:   CT Orbits.     OPTIC NERVES: Normal.  LACRIMAL GLANDS:  Normal.  EXTRAOCULAR MUSCLES:  Normal.  VISUALIZED INTRACRANIAL STRUCTURES:   Normal.  VISUALIZED SINUSES:   The right maxillary sinus is opacified. There is   mucosal thickening and large fluid level in the left maxillary sinus.   There is opacification of several ethmoid air cells.  BONES:  Normal.  MISCELLANEOUS:  There is a periapical lucency involving the left   maxillary second molar with associated cortical dehiscence. No overlying   discrete fluid collection.    IMPRESSION: Right periorbital cellulitis with post septal extension as   described above. There is associated mild right nasopharyngeal. No   discrete fluid collection.    There is acute maxillary sinusitis.

## 2023-01-11 NOTE — ED PROVIDER NOTE - RAPID ASSESSMENT
Private Physician Sue Aguilar pcp,. Kayla Clinton Optho/[facility] [phone]  47y female pmh Downs syndrome, Duodenitis/Gastritis , Gilbert's syndrome, Hypothyroid, FE anemia, OCS, Ovarian cyst,ASD sp repair, Anxiety Celiac dz Pt lives in group home comes to ed c/o 3d hx of rt eye irritation tx w zithromycin, seen by Private Physician Optho and referred to ed for concerns for preseptal cellulitis. PE WDWN female w obvious downs facies. RT eye lid red swollen w erythema extending to cheek.  Edwin Bryant MD, Facep    PT seen as Triage/Q-doc full evaluation to be performed once pt is transferred to main ED.  Edwin Bryant MD, Face

## 2023-01-11 NOTE — ED PROVIDER NOTE - NSCAREINITIATED _GEN_ER
Chief complaint:   Chief Complaint   Patient presents with   • Fever     seen in ED 17 for breathing problem   • Vomiting       Vitals:  Visit Vitals   • Pulse 106   • Temp 98.7 °F (37.1 °C) (Temporal Artery)   • Resp 22   • Wt 9.33 kg   • SpO2 98%       HISTORY OF PRESENT ILLNESS     HPI   Ju Galvez is a 15 month old  female here today with her mother with concerns for vomiting that started today. Mom states she had an appointment in the clinic and the child started vomiting. She did vomit once this morning after taking her amoxicillin. She was seen in the emergency department 4 days ago and diagnosed with a left otitis media. She has been taking amoxicillin. She states she did have a fever 4 days ago but this has resolved. She has not had any diarrhea. Her last bowel movement was 2 days ago. No history of constipation. She's had nasal drainage and congestion and a mild cough for the past 4 days. She did have some wheezing and was given an albuterol inhaler and mom has been using this twice daily. She did use it this morning. Her activity has been down and she is crying more. She has been sleeping okay at night. Mom has not given her anything for her current symptoms.        Other significant problems:  Patient Active Problem List    Diagnosis Date Noted   • Hyperbilirubinemia,  2016     Priority: Low     Maternal blood type O (+), infant A (+), christiana 1+.  Treated with phototherapy , for ~12 hours.  Peak bili 15 (as of  pm).         PAST MEDICAL, FAMILY AND SOCIAL HISTORY     Medications:  Current Outpatient Prescriptions   Medication   • amoxicillin (AMOXIL) 250 MG/5ML suspension   • albuterol 108 (90 Base) MCG/ACT inhaler   • ondansetron (ZOFRAN ODT) 4 MG disintegrating tablet   • ketoconazole (NIZORAL) 2 % cream   • ibuprofen (MOTRIN,ADVIL) 100 MG/5ML suspension   • Acetaminophen (TYLENOL INFANTS PO)     No current facility-administered medications for this visit.         Allergies:  ALLERGIES:  No Known Allergies    Past Medical  History/Surgeries:  History reviewed. No pertinent past medical history.    History reviewed. No pertinent surgical history.    Family History:  Family History   Problem Relation Age of Onset   • Diabetes Father    • Diabetes Maternal Aunt    • Cancer Maternal Aunt      breast   • Diabetes Maternal Grandfather    • Heart disease Other      MGGM   • Cancer Other      PGGF prostate cancer   • Asthma Neg Hx        Social History:  Social History   Substance Use Topics   • Smoking status: Passive Smoke Exposure - Never Smoker   • Smokeless tobacco: Never Used   • Alcohol use No       REVIEW OF SYSTEMS     Review of Systems  See hpi  PHYSICAL EXAM     Physical Exam   Constitutional: She appears well-developed and well-nourished. She is active, playful and cooperative.  Non-toxic appearance. No distress.   HENT:   Head: Normocephalic and atraumatic.   Right Ear: Tympanic membrane, external ear and canal normal.   Left Ear: External ear and canal normal. A middle ear effusion is present.   Nose: Nose normal. No nasal discharge.   Mouth/Throat: Mucous membranes are moist. No oropharyngeal exudate, pharynx swelling, pharynx erythema or pharynx petechiae. No tonsillar exudate. Oropharynx is clear. Pharynx is normal.   There is fluid present behind the left TM with mild erythema.   Eyes: Conjunctivae are normal. Left eye exhibits no discharge.   Neck: Normal range of motion. Neck supple. No adenopathy.   Cardiovascular: Normal rate and regular rhythm.    No murmur heard.  Pulmonary/Chest: Effort normal and breath sounds normal. No nasal flaring. No respiratory distress. She has no wheezes. She has no rhonchi. She exhibits no retraction.   Abdominal: Soft. Bowel sounds are normal. There is no tenderness.   Neurological: She is alert.   Skin: Skin is warm and dry. No rash noted.   Vitals reviewed.      ASSESSMENT/PLAN     Ju was seen today for fever and  vomiting.    Diagnoses and all orders for this visit:    Non-intractable vomiting with nausea, unspecified vomiting type  -     ondansetron (ZOFRAN ODT) disintegrating tablet 2 mg; Take 0.5 tablets by mouth once.    Left acute suppurative otitis media    She was given a 2 mg Zofran ODT tablet and 2 mg to go home to be taken every 8 hours as needed for nausea and vomiting.  We discussed altering diet.  Starting with clear liquids and advancing to a bland/soft diet as tolerated.  Importance of encouraging small amounts of fluid to maintain hydration.  Child appears well-hydrated at this time.  Finish out the amoxicillin, may restart this evening as long as she is tolerating fluids.  If not improving in 3-5 days, should recheck with her primary care provider.   Richard Helms(Resident)

## 2023-01-11 NOTE — ED PROVIDER NOTE - OBJECTIVE STATEMENT
47-year-old female history of Down syndrome gastritis Guilbert disease anemia celiac's disease chief complaint 3 days of right eye irritation treated with at azithromycin seen by ophthalmology and referred for concern for preseptal cellulitis patient has right eyelid shut with discharge per the father.  Patient notes also pain with moving the eye around.

## 2023-01-11 NOTE — ED PROVIDER NOTE - NSICDXFAMILYHX_GEN_ALL_CORE_FT
FAMILY HISTORY:  Mother  Still living? No  Family history of Alzheimer's disease, Age at diagnosis: Age Unknown  No family history of COPD, Age at diagnosis: Age Unknown

## 2023-01-11 NOTE — ED PROVIDER NOTE - NSICDXPASTSURGICALHX_GEN_ALL_CORE_FT
PAST SURGICAL HISTORY:  ASD (atrial septal defect) ASD repair Oct 2001    H/O hernia repair 2002, 2009 with mesh    History of colon surgery duodeno [id number]    History of colon surgery revision, gastro jejunostomy and lysis of adhesions    S/P tonsillectomy

## 2023-01-11 NOTE — ED PROVIDER NOTE - CLINICAL SUMMARY MEDICAL DECISION MAKING FREE TEXT BOX
47-year-old female multiple comorbidities including Down syndrome celiac disease chief complaint right eye pain/swelling given history and physical concern for preseptal cellulitis will obtain Ortho consult in addition to probable admission for Vanco and Unasyn

## 2023-01-11 NOTE — ED PROVIDER NOTE - NSICDXPASTMEDICALHX_GEN_ALL_CORE_FT
PAST MEDICAL HISTORY:  Acquired subluxation of left patella, sequela     Acute depression     Afferent loop syndrome     Anxiety     ASD (atrial septal defect)     Celiac disease     Cyst of ovary, unspecified laterality     Down syndrome     Duodenitis     Gastritis     Gilbert's syndrome     Gluten free diet     History of pseudoseizure     Hypothyroid     Iron deficiency anemia, unspecified iron deficiency anemia type     Lactose intolerance     Macrocytic anemia     MRSA (methicillin resistant Staphylococcus aureus) MSSA/MRSA detected in Nose culture    Obsessive compulsive disorder     Sleep disturbance     Syncope, unspecified syncope type

## 2023-01-11 NOTE — CONSULT NOTE ADULT - ASSESSMENT
46 YO F with PMH of Downs syndrome, Duodenitis/Gastritis , Gilbert's syndrome, Hypothyroid, FE anemia, OCS, Ovarian cyst, ASD sp repair, Anxiety, Celiac dz presenting for Right  periorbital swelling of 2 days duration. History obtained from father at bedside. Noted swelling started 2 days ago, went to PCP and was prescribed Zpak. Swelling did not improve and pt went to see ophthalmologist Dr. Kayla Clinton today who diagnosed pt with preseptal cellulitis. ENT was consulted for evaluation of Right Maxillary sinus opacification found on CT Orbits. Pt reports runny nose, nasal congestion for past 2 weeks.  48 YO F with PMH of Downs syndrome, Duodenitis/Gastritis , Gilbert's syndrome, Hypothyroid, FE anemia, OCS, Ovarian cyst, ASD sp repair, Anxiety, Celiac dz presenting for Right  periorbital swelling of 2 days duration went to see ophthalmologist today who diagnosed pt with preseptal cellulitis. ENT was consulted for evaluation of Right Maxillary sinus opacification found on CT Orbits. Pt reports runny nose, nasal congestion for past 2 weeks. WBC: 8.93. Afebrile.

## 2023-01-11 NOTE — ED PROVIDER NOTE - ATTENDING CONTRIBUTION TO CARE
attending Bruno: 47yF h/o Down syndrome, gastritis, Gilbert disease, anemia, celiac's disease p/w worsening irritation and swelling to R eye, had been seen outpatient by ophtho and treated with azithromycin, sent in for concern for septal cellulitis. R eye swelling to point where she cannot open eye, +purulent drainage. Pt seen by triage MD in waiting room, CT performed showing post septal cellulitis. Will obtain labs, IV abx, ophtho consult, admission

## 2023-01-11 NOTE — CONSULT NOTE ADULT - PROBLEM SELECTOR RECOMMENDATION 9
- IV Unasyn   - IV Decadron 10mg q8h x3 doses.   - NS 0.65% nasal spray TID x3 weeks.   - Afrin 1 spray to BID x3 days.   - Flonase 1spray b/l BID.   - F/U Bacterial ,Fungal, AFB Cx from sinuses.   - ID consult.   - IVF Hydration.   -  ENT will follow.   - Call with questions.     # [phone]  ENT PA

## 2023-01-12 DIAGNOSIS — R94.31 ABNORMAL ELECTROCARDIOGRAM [ECG] [EKG]: ICD-10-CM

## 2023-01-12 DIAGNOSIS — I95.1 ORTHOSTATIC HYPOTENSION: ICD-10-CM

## 2023-01-12 DIAGNOSIS — J32.0 CHRONIC MAXILLARY SINUSITIS: ICD-10-CM

## 2023-01-12 DIAGNOSIS — E03.9 HYPOTHYROIDISM, UNSPECIFIED: ICD-10-CM

## 2023-01-12 DIAGNOSIS — Z29.9 ENCOUNTER FOR PROPHYLACTIC MEASURES, UNSPECIFIED: ICD-10-CM

## 2023-01-12 DIAGNOSIS — H05.011 CELLULITIS OF RIGHT ORBIT: ICD-10-CM

## 2023-01-12 LAB
ALBUMIN SERPL ELPH-MCNC: 3 G/DL — LOW (ref 3.3–5)
ALP SERPL-CCNC: 134 U/L — HIGH (ref 40–120)
ALT FLD-CCNC: 28 U/L — SIGNIFICANT CHANGE UP (ref 10–45)
ANION GAP SERPL CALC-SCNC: 10 MMOL/L — SIGNIFICANT CHANGE UP (ref 5–17)
APPEARANCE UR: CLEAR — SIGNIFICANT CHANGE UP
AST SERPL-CCNC: 20 U/L — SIGNIFICANT CHANGE UP (ref 10–40)
BACTERIA # UR AUTO: NEGATIVE — SIGNIFICANT CHANGE UP
BILIRUB SERPL-MCNC: 0.6 MG/DL — SIGNIFICANT CHANGE UP (ref 0.2–1.2)
BILIRUB UR-MCNC: NEGATIVE — SIGNIFICANT CHANGE UP
BUN SERPL-MCNC: 11 MG/DL — SIGNIFICANT CHANGE UP (ref 7–23)
CALCIUM SERPL-MCNC: 8.6 MG/DL — SIGNIFICANT CHANGE UP (ref 8.4–10.5)
CHLORIDE SERPL-SCNC: 99 MMOL/L — SIGNIFICANT CHANGE UP (ref 96–108)
CO2 SERPL-SCNC: 31 MMOL/L — SIGNIFICANT CHANGE UP (ref 22–31)
COLOR SPEC: YELLOW — SIGNIFICANT CHANGE UP
CREAT SERPL-MCNC: 0.65 MG/DL — SIGNIFICANT CHANGE UP (ref 0.5–1.3)
CRP SERPL-MCNC: 161 MG/L — HIGH (ref 0–4)
DIFF PNL FLD: ABNORMAL
EGFR: 109 ML/MIN/1.73M2 — SIGNIFICANT CHANGE UP
EPI CELLS # UR: 3 /HPF — SIGNIFICANT CHANGE UP
ERYTHROCYTE [SEDIMENTATION RATE] IN BLOOD: 120 MM/HR — HIGH (ref 0–15)
GLUCOSE SERPL-MCNC: 117 MG/DL — HIGH (ref 70–99)
GLUCOSE UR QL: NEGATIVE — SIGNIFICANT CHANGE UP
HCG SERPL-ACNC: <2 MIU/ML — SIGNIFICANT CHANGE UP
HCT VFR BLD CALC: 36.3 % — SIGNIFICANT CHANGE UP (ref 34.5–45)
HGB BLD-MCNC: 12 G/DL — SIGNIFICANT CHANGE UP (ref 11.5–15.5)
HYALINE CASTS # UR AUTO: 4 /LPF — HIGH (ref 0–2)
KETONES UR-MCNC: NEGATIVE — SIGNIFICANT CHANGE UP
LEUKOCYTE ESTERASE UR-ACNC: NEGATIVE — SIGNIFICANT CHANGE UP
MAGNESIUM SERPL-MCNC: 2.1 MG/DL — SIGNIFICANT CHANGE UP (ref 1.6–2.6)
MCHC RBC-ENTMCNC: 33 PG — SIGNIFICANT CHANGE UP (ref 27–34)
MCHC RBC-ENTMCNC: 33.1 GM/DL — SIGNIFICANT CHANGE UP (ref 32–36)
MCV RBC AUTO: 99.7 FL — SIGNIFICANT CHANGE UP (ref 80–100)
NITRITE UR-MCNC: NEGATIVE — SIGNIFICANT CHANGE UP
NRBC # BLD: 0 /100 WBCS — SIGNIFICANT CHANGE UP (ref 0–0)
PH UR: 6.5 — SIGNIFICANT CHANGE UP (ref 5–8)
PHOSPHATE SERPL-MCNC: 3.6 MG/DL — SIGNIFICANT CHANGE UP (ref 2.5–4.5)
PLATELET # BLD AUTO: 313 K/UL — SIGNIFICANT CHANGE UP (ref 150–400)
POTASSIUM SERPL-MCNC: 3.4 MMOL/L — LOW (ref 3.5–5.3)
POTASSIUM SERPL-SCNC: 3.4 MMOL/L — LOW (ref 3.5–5.3)
PROT SERPL-MCNC: 6.8 G/DL — SIGNIFICANT CHANGE UP (ref 6–8.3)
PROT UR-MCNC: ABNORMAL
RBC # BLD: 3.64 M/UL — LOW (ref 3.8–5.2)
RBC # FLD: 15.2 % — HIGH (ref 10.3–14.5)
RBC CASTS # UR COMP ASSIST: 409 /HPF — HIGH (ref 0–4)
RHEUMATOID FACT SERPL-ACNC: 21 IU/ML — HIGH (ref 0–13)
RHEUMATOID FACT SERPL-ACNC: 23 IU/ML — HIGH (ref 0–13)
SODIUM SERPL-SCNC: 140 MMOL/L — SIGNIFICANT CHANGE UP (ref 135–145)
SP GR SPEC: >1.05 (ref 1.01–1.02)
TSH SERPL-MCNC: 3.35 UIU/ML — SIGNIFICANT CHANGE UP (ref 0.27–4.2)
UROBILINOGEN FLD QL: NEGATIVE — SIGNIFICANT CHANGE UP
WBC # BLD: 6.23 K/UL — SIGNIFICANT CHANGE UP (ref 3.8–10.5)
WBC # FLD AUTO: 6.23 K/UL — SIGNIFICANT CHANGE UP (ref 3.8–10.5)
WBC UR QL: 14 /HPF — HIGH (ref 0–5)

## 2023-01-12 PROCEDURE — 99223 1ST HOSP IP/OBS HIGH 75: CPT

## 2023-01-12 PROCEDURE — 99222 1ST HOSP IP/OBS MODERATE 55: CPT | Mod: FS

## 2023-01-12 PROCEDURE — 99221 1ST HOSP IP/OBS SF/LOW 40: CPT

## 2023-01-12 PROCEDURE — 99222 1ST HOSP IP/OBS MODERATE 55: CPT

## 2023-01-12 RX ORDER — ASCORBIC ACID 60 MG
500 TABLET,CHEWABLE ORAL DAILY
Refills: 0 | Status: DISCONTINUED | OUTPATIENT
Start: 2023-01-12 | End: 2023-01-16

## 2023-01-12 RX ORDER — OXYMETAZOLINE HYDROCHLORIDE 0.5 MG/ML
1 SPRAY NASAL
Refills: 0 | Status: COMPLETED | OUTPATIENT
Start: 2023-01-12 | End: 2023-01-14

## 2023-01-12 RX ORDER — DEXAMETHASONE 0.5 MG/5ML
10 ELIXIR ORAL EVERY 8 HOURS
Refills: 0 | Status: COMPLETED | OUTPATIENT
Start: 2023-01-12 | End: 2023-01-12

## 2023-01-12 RX ORDER — NEOMYCIN/POLYMYXIN B/DEXAMETHA 0.1 %
1 SUSPENSION, DROPS(FINAL DOSAGE FORM)(ML) OPHTHALMIC (EYE) THREE TIMES A DAY
Refills: 0 | Status: DISCONTINUED | OUTPATIENT
Start: 2023-01-12 | End: 2023-01-16

## 2023-01-12 RX ORDER — SACCHAROMYCES BOULARDII 250 MG
250 POWDER IN PACKET (EA) ORAL
Refills: 0 | Status: DISCONTINUED | OUTPATIENT
Start: 2023-01-12 | End: 2023-01-16

## 2023-01-12 RX ORDER — SUCRALFATE 1 G
1 TABLET ORAL
Refills: 0 | Status: DISCONTINUED | OUTPATIENT
Start: 2023-01-12 | End: 2023-01-16

## 2023-01-12 RX ORDER — ERGOCALCIFEROL 1.25 MG/1
50000 CAPSULE ORAL
Refills: 0 | Status: DISCONTINUED | OUTPATIENT
Start: 2023-01-12 | End: 2023-01-16

## 2023-01-12 RX ORDER — LEVOTHYROXINE SODIUM 125 MCG
300 TABLET ORAL DAILY
Refills: 0 | Status: DISCONTINUED | OUTPATIENT
Start: 2023-01-12 | End: 2023-01-16

## 2023-01-12 RX ORDER — PANTOPRAZOLE SODIUM 20 MG/1
40 TABLET, DELAYED RELEASE ORAL
Refills: 0 | Status: DISCONTINUED | OUTPATIENT
Start: 2023-01-12 | End: 2023-01-16

## 2023-01-12 RX ORDER — MIDODRINE HYDROCHLORIDE 2.5 MG/1
10 TABLET ORAL THREE TIMES A DAY
Refills: 0 | Status: DISCONTINUED | OUTPATIENT
Start: 2023-01-12 | End: 2023-01-16

## 2023-01-12 RX ORDER — ENOXAPARIN SODIUM 100 MG/ML
40 INJECTION SUBCUTANEOUS EVERY 24 HOURS
Refills: 0 | Status: DISCONTINUED | OUTPATIENT
Start: 2023-01-12 | End: 2023-01-16

## 2023-01-12 RX ORDER — SODIUM CHLORIDE 0.65 %
1 AEROSOL, SPRAY (ML) NASAL THREE TIMES A DAY
Refills: 0 | Status: DISCONTINUED | OUTPATIENT
Start: 2023-01-12 | End: 2023-01-16

## 2023-01-12 RX ORDER — FOLIC ACID 0.8 MG
1 TABLET ORAL DAILY
Refills: 0 | Status: DISCONTINUED | OUTPATIENT
Start: 2023-01-12 | End: 2023-01-16

## 2023-01-12 RX ORDER — DORZOLAMIDE HYDROCHLORIDE TIMOLOL MALEATE 20; 5 MG/ML; MG/ML
1 SOLUTION/ DROPS OPHTHALMIC EVERY 12 HOURS
Refills: 0 | Status: DISCONTINUED | OUTPATIENT
Start: 2023-01-12 | End: 2023-01-16

## 2023-01-12 RX ORDER — PREGABALIN 225 MG/1
1000 CAPSULE ORAL DAILY
Refills: 0 | Status: DISCONTINUED | OUTPATIENT
Start: 2023-01-12 | End: 2023-01-16

## 2023-01-12 RX ORDER — VANCOMYCIN HCL 1 G
1000 VIAL (EA) INTRAVENOUS EVERY 12 HOURS
Refills: 0 | Status: DISCONTINUED | OUTPATIENT
Start: 2023-01-12 | End: 2023-01-15

## 2023-01-12 RX ADMIN — DORZOLAMIDE HYDROCHLORIDE TIMOLOL MALEATE 1 DROP(S): 20; 5 SOLUTION/ DROPS OPHTHALMIC at 18:48

## 2023-01-12 RX ADMIN — Medication 1 SPRAY(S): at 14:26

## 2023-01-12 RX ADMIN — OXYMETAZOLINE HYDROCHLORIDE 1 SPRAY(S): 0.5 SPRAY NASAL at 17:59

## 2023-01-12 RX ADMIN — ERGOCALCIFEROL 50000 UNIT(S): 1.25 CAPSULE ORAL at 14:17

## 2023-01-12 RX ADMIN — Medication 250 MILLIGRAM(S): at 02:17

## 2023-01-12 RX ADMIN — Medication 300 MICROGRAM(S): at 14:13

## 2023-01-12 RX ADMIN — Medication 102 MILLIGRAM(S): at 07:28

## 2023-01-12 RX ADMIN — Medication 1 APPLICATION(S): at 05:52

## 2023-01-12 RX ADMIN — Medication 102 MILLIGRAM(S): at 23:37

## 2023-01-12 RX ADMIN — Medication 1 APPLICATION(S): at 23:52

## 2023-01-12 RX ADMIN — Medication 250 MILLIGRAM(S): at 14:11

## 2023-01-12 RX ADMIN — Medication 250 MILLIGRAM(S): at 18:47

## 2023-01-12 RX ADMIN — MIDODRINE HYDROCHLORIDE 10 MILLIGRAM(S): 2.5 TABLET ORAL at 12:38

## 2023-01-12 RX ADMIN — Medication 1 MILLIGRAM(S): at 14:17

## 2023-01-12 RX ADMIN — PANTOPRAZOLE SODIUM 40 MILLIGRAM(S): 20 TABLET, DELAYED RELEASE ORAL at 18:03

## 2023-01-12 RX ADMIN — Medication 500 MILLIGRAM(S): at 14:16

## 2023-01-12 RX ADMIN — Medication 1 TABLET(S): at 14:16

## 2023-01-12 RX ADMIN — Medication 102 MILLIGRAM(S): at 14:12

## 2023-01-12 RX ADMIN — Medication 1 GRAM(S): at 17:58

## 2023-01-12 RX ADMIN — AMPICILLIN SODIUM AND SULBACTAM SODIUM 200 GRAM(S): 250; 125 INJECTION, POWDER, FOR SUSPENSION INTRAMUSCULAR; INTRAVENOUS at 23:38

## 2023-01-12 RX ADMIN — PREGABALIN 1000 MICROGRAM(S): 225 CAPSULE ORAL at 14:16

## 2023-01-12 RX ADMIN — AMPICILLIN SODIUM AND SULBACTAM SODIUM 200 GRAM(S): 250; 125 INJECTION, POWDER, FOR SUSPENSION INTRAMUSCULAR; INTRAVENOUS at 12:15

## 2023-01-12 RX ADMIN — AMPICILLIN SODIUM AND SULBACTAM SODIUM 200 GRAM(S): 250; 125 INJECTION, POWDER, FOR SUSPENSION INTRAMUSCULAR; INTRAVENOUS at 06:54

## 2023-01-12 RX ADMIN — Medication 1 SPRAY(S): at 23:50

## 2023-01-12 RX ADMIN — AMPICILLIN SODIUM AND SULBACTAM SODIUM 200 GRAM(S): 250; 125 INJECTION, POWDER, FOR SUSPENSION INTRAMUSCULAR; INTRAVENOUS at 17:58

## 2023-01-12 RX ADMIN — Medication 1 APPLICATION(S): at 14:19

## 2023-01-12 RX ADMIN — Medication 1 SPRAY(S): at 06:54

## 2023-01-12 RX ADMIN — OXYMETAZOLINE HYDROCHLORIDE 1 SPRAY(S): 0.5 SPRAY NASAL at 06:54

## 2023-01-12 RX ADMIN — DORZOLAMIDE HYDROCHLORIDE TIMOLOL MALEATE 1 DROP(S): 20; 5 SOLUTION/ DROPS OPHTHALMIC at 05:52

## 2023-01-12 RX ADMIN — ENOXAPARIN SODIUM 40 MILLIGRAM(S): 100 INJECTION SUBCUTANEOUS at 14:13

## 2023-01-12 NOTE — CONSULT NOTE ADULT - SUBJECTIVE AND OBJECTIVE BOX
[facility] DEPARTMENT OF OPHTHALMOLOGY - INITIAL ADULT CONSULT  -----------------------------------------------------------------------------------------------------------  Kerline Gold MD PGY-3  -----------------------------------------------------------------------------------------------------------    HPI:   Interval History: ***    PAST MEDICAL & SURGICAL HISTORY:  Down syndrome      Celiac disease      Hypothyroid      ASD (atrial septal defect)      MRSA (methicillin resistant Staphylococcus aureus)  MSSA/MRSA detected in Nose culture      Lactose intolerance      Macrocytic anemia      Acquired subluxation of left patella, sequela      Gilbert&#x27;s syndrome      Iron deficiency anemia, unspecified iron deficiency anemia type      Cyst of ovary, unspecified laterality      Sleep disturbance      Obsessive compulsive disorder      History of pseudoseizure      Duodenitis      Syncope, unspecified syncope type      Gastritis      Afferent loop syndrome      Anxiety      Acute depression      Gluten free diet      S/P tonsillectomy      ASD (atrial septal defect)  ASD repair Oct 2001      H/O hernia repair  2002, 2009 with mesh      History of colon surgery  duodeno [id number]      History of colon surgery  revision, gastro jejunostomy and lysis of adhesions        Past Ocular History: denies surg/laser  Ophthalmic Medications: none  FAMILY HISTORY:  Family history of Alzheimer&#x27;s disease (Mother)    No family history of COPD (Mother)     no glc/amd  Social History: no etoh/tobacco    MEDICATIONS  (STANDING):  ampicillin/sulbactam  IVPB      ampicillin/sulbactam  IVPB 3 Gram(s) IV Intermittent every 6 hours  vancomycin  IVPB. 1000 milliGRAM(s) IV Intermittent once    MEDICATIONS  (PRN):    Allergies & Intolerances:   Celiac (Unknown)    Review of Systems:  Constitutional: No fever, chills  Eyes: No blurry vision, flashes, floaters, FBS, +erythema, +discharge, double vision, OU  Neuro: No tremors  Cardiovascular: No chest pain, palpitations  Respiratory: No SOB, no cough  GI: No nausea, vomiting, abdominal pain  : No dysuria  Skin: no rash  Psych: no depression  Endocrine: no polyuria, polydipsia  Heme/lymph: no swelling    VITALS: T(C): 37.2 (01-11-23 @ 23:14)  T(F): 98.9 (01-11-23 @ 23:14), Max: 99 (01-11-23 @ 17:36)  HR: 93 (01-11-23 @ 23:14) (69 - 93)  BP: 107/- (01-11-23 @ 23:14) (95/64 - 107/-)  RR:  (18 - 21)  SpO2:  (95% - 100%)  Wt(kg): --  General: AAO x 3, appropriate mood and affect    Ophthalmology Exam:  Visual acuity (sc): 20/30 OD, 20/30 OS  Pupils: PERRL OU, ?APD OD - sluggish and noted on reverse  Ttono: 24, 10  Extraocular movements (EOMs): Difficult to assess due to pt cooperativity: -3 abduction, -3 supraduction, -1 infraduction, full adduction OD. Full OS.   Confrontational Visual Field (CVF): Full OU  Color Plates: 10/12, 10/12  +RTR OD    Pen Light Exam (PLE)  External: periorbital edema and erythema OD, flat OS  Lids/Lashes/Lacrimal Ducts: 3+ lid edema and erythema OD - unable to open eye spontaneously. Flat OS  Sclera/Conjunctiva: 2+ injection, 1+ chemosis OD. W+Q OS  Cornea: Cl OU  Anterior Chamber: D+F OU    Iris: Flat OU  Lens: NS OU    Fundus Exam: dilated with 1% tropicamide and 2.5% phenylephrine  Approval obtained from primary team for dilation  Patient aware that pupils can remained dilated for at least 4-6 hours  Exam performed with 20D lens    Vitreous: wnl OU  Disc, cup/disc: sharp and pink, 0.2 OU  Macula: wnl OU  Vessels: wnl OU  Periphery: wnl OU    Labs/Imaging:  < from: CT Orbit w/ IV Cont (01.11.23 @ 20:18) >    IMPRESSION: Right periorbital cellulitis with post septal extension as   described above. There is associated mild right nasopharyngeal. No   discrete fluid collection.    There is acutemaxillary sinusitis.      < end of copied text >     [facility] DEPARTMENT OF OPHTHALMOLOGY - INITIAL ADULT CONSULT  -----------------------------------------------------------------------------------------------------------  Kerline Gold MD PGY-3  -----------------------------------------------------------------------------------------------------------    HPI: 47F PMH Downs syndrome, Duodenitis/Gastritis , Gilbert's syndrome, Hypothyroid, FE anemia, OCS, Ovarian cyst, ASD sp repair, Anxiety, Celiac dz presenting for periorbital swelling OD of 2 days duration. History obtained from father at bedside. Noted swelling started 2 days ago, went to PCP and was prescribed Zpak. Swelling did not improve and pt went to see ophthalmologist Dr. Kayla Clinton today who diagnosed pt with preseptal cellulitis. POH cataracts OD>OS (vision OD documented 20/50), blepharitis, LAWRENCE, congenital nystagmus. Zpak was dc'd by Dr. Clinton and pt was started on Keflex. Periorbital swelling continued to worsen and father brought pt to ED. Reports URI symptoms for past 2 weeks. Denies fevers. Pt lives in group home. Reports eye pain OD.       PAST MEDICAL & SURGICAL HISTORY:  Down syndrome      Celiac disease      Hypothyroid      ASD (atrial septal defect)      MRSA (methicillin resistant Staphylococcus aureus)  MSSA/MRSA detected in Nose culture      Lactose intolerance      Macrocytic anemia      Acquired subluxation of left patella, sequela      Gilbert&#x27;s syndrome      Iron deficiency anemia, unspecified iron deficiency anemia type      Cyst of ovary, unspecified laterality      Sleep disturbance      Obsessive compulsive disorder      History of pseudoseizure      Duodenitis      Syncope, unspecified syncope type      Gastritis      Afferent loop syndrome      Anxiety      Acute depression      Gluten free diet      S/P tonsillectomy      ASD (atrial septal defect)  ASD repair Oct 2001      H/O hernia repair  2002, 2009 with mesh      History of colon surgery  duodeno [id number]      History of colon surgery  revision, gastro jejunostomy and lysis of adhesions        Past Ocular History: denies surg/laser  Ophthalmic Medications: none  FAMILY HISTORY:  Family history of Alzheimer&#x27;s disease (Mother)    No family history of COPD (Mother)   no glc/amd  Social History: no etoh/tobacco    MEDICATIONS  (STANDING):  ampicillin/sulbactam  IVPB      ampicillin/sulbactam  IVPB 3 Gram(s) IV Intermittent every 6 hours  vancomycin  IVPB. 1000 milliGRAM(s) IV Intermittent once    MEDICATIONS  (PRN):    Allergies & Intolerances:   Celiac (Unknown)    Review of Systems:  Constitutional: No fever, chills  Eyes: No blurry vision, flashes, floaters, FBS. +erythema, +discharge, no double vision, OU  Neuro: No tremors  Cardiovascular: No chest pain, palpitations  Respiratory: No SOB, no cough, +URI symptoms  GI: No nausea, vomiting, abdominal pain  : No dysuria  Skin: no rash  Psych: no depression  Endocrine: no polyuria, polydipsia  Heme/lymph: no swelling    VITALS: T(C): 37.2 (01-11-23 @ 23:14)  T(F): 98.9 (01-11-23 @ 23:14), Max: 99 (01-11-23 @ 17:36)  HR: 93 (01-11-23 @ 23:14) (69 - 93)  BP: 107/- (01-11-23 @ 23:14) (95/64 - 107/-)  RR:  (18 - 21)  SpO2:  (95% - 100%)  Wt(kg): --  General: AAO x 3, appropriate mood and affect    Ophthalmology Exam:  Visual acuity (sc): 20/30 OD, 20/30 OS  Pupils: PERRL OU, ?APD OD - sluggish and noted on reverse testing  Ttono: 24, 10  Extraocular movements (EOMs): Difficult to assess due to pt cooperativity: -3 abduction, -3 supraduction, -1 infraduction, full adduction OD. Full OS.   Confrontational Visual Field (CVF): Full OU  Color Plates: 10/12, 10/12  +RTR OD    Pen Light Exam (PLE)  External: periorbital edema and erythema OD, flat OS  Lids/Lashes/Lacrimal Ducts: 3+ lid edema and erythema OD - unable to open eye spontaneously. Flat OS  Sclera/Conjunctiva: 2+ injection, 1+ chemosis OD. W+Q OS  Cornea: Cl OU  Anterior Chamber: D+F OU    Iris: Flat OU  Lens: NS OU    Fundus Exam: dilated with 1% tropicamide and 2.5% phenylephrine  Approval obtained from primary team for dilation  Patient aware that pupils can remained dilated for at least 4-6 hours  Exam performed with 20D lens    Vitreous: wnl OU  Disc, cup/disc: sharp and pink, 0.2 OU  Macula: wnl OU  Vessels: wnl OU  Periphery: wnl OU    Labs/Imaging:  < from: CT Orbit w/ IV Cont (01.11.23 @ 20:18) >    IMPRESSION: Right periorbital cellulitis with post septal extension as   described above. There is associated mild right nasopharyngeal. No   discrete fluid collection.    There is acutemaxillary sinusitis.      < end of copied text >     [facility] DEPARTMENT OF OPHTHALMOLOGY - INITIAL ADULT CONSULT  -----------------------------------------------------------------------------------------------------------  Kerline Gold MD PGY-3  -----------------------------------------------------------------------------------------------------------    HPI: 47F PMH Downs syndrome, Duodenitis/Gastritis , Gilbert's syndrome, Hypothyroid, FE anemia, OCS, Ovarian cyst, ASD sp repair, Anxiety, Celiac dz presenting for periorbital swelling OD of 2 days duration. History obtained from father at bedside. Noted swelling started 2 days ago, went to PCP and was prescribed Zpak. Swelling did not improve and pt went to see ophthalmologist Dr. Kayla Clinton today who diagnosed pt with preseptal cellulitis. POH cataracts OD>OS (vision OD documented 20/50), blepharitis, LAWRENCE, congenital nystagmus. Zpak was dc'd by Dr. Clinton and pt was started on Keflex. Periorbital swelling continued to worsen and father brought pt to ED. Reports URI symptoms for past 2 weeks. Denies fevers. Pt lives in group home. Reports eye pain OD.       PAST MEDICAL & SURGICAL HISTORY:  Down syndrome      Celiac disease      Hypothyroid      ASD (atrial septal defect)      MRSA (methicillin resistant Staphylococcus aureus)  MSSA/MRSA detected in Nose culture      Lactose intolerance      Macrocytic anemia      Acquired subluxation of left patella, sequela      Gilbert&#x27;s syndrome      Iron deficiency anemia, unspecified iron deficiency anemia type      Cyst of ovary, unspecified laterality      Sleep disturbance      Obsessive compulsive disorder      History of pseudoseizure      Duodenitis      Syncope, unspecified syncope type      Gastritis      Afferent loop syndrome      Anxiety      Acute depression      Gluten free diet      S/P tonsillectomy      ASD (atrial septal defect)  ASD repair Oct 2001      H/O hernia repair  2002, 2009 with mesh      History of colon surgery  duodeno [id number]      History of colon surgery  revision, gastro jejunostomy and lysis of adhesions        Past Ocular History: denies surg/laser  Ophthalmic Medications: none  FAMILY HISTORY:  Family history of Alzheimer&#x27;s disease (Mother)    No family history of COPD (Mother)   no glc/amd  Social History: no etoh/tobacco    MEDICATIONS  (STANDING):  ampicillin/sulbactam  IVPB      ampicillin/sulbactam  IVPB 3 Gram(s) IV Intermittent every 6 hours  vancomycin  IVPB. 1000 milliGRAM(s) IV Intermittent once    MEDICATIONS  (PRN):    Allergies & Intolerances:   Celiac (Unknown)    Review of Systems:  Constitutional: No fever, chills  Eyes: No blurry vision, flashes, floaters, FBS. +erythema, +discharge, no double vision, OU  Neuro: No tremors  Cardiovascular: No chest pain, palpitations  Respiratory: No SOB, no cough, +URI symptoms  GI: No nausea, vomiting, abdominal pain  : No dysuria  Skin: no rash  Psych: no depression  Endocrine: no polyuria, polydipsia  Heme/lymph: no swelling    VITALS: T(C): 37.2 (01-11-23 @ 23:14)  T(F): 98.9 (01-11-23 @ 23:14), Max: 99 (01-11-23 @ 17:36)  HR: 93 (01-11-23 @ 23:14) (69 - 93)  BP: 107/- (01-11-23 @ 23:14) (95/64 - 107/-)  RR:  (18 - 21)  SpO2:  (95% - 100%)  Wt(kg): --  General: AAO x 3, appropriate mood and affect    Ophthalmology Exam:  Visual acuity (sc): 20/30 OD, 20/30 OS  Pupils: PERRL OU, ? trace APD OD? - sluggishly reactive OD  Ttono: 24, 10  Extraocular movements (EOMs): Difficult to assess due to pt cooperativity: -3 abduction, -3 supraduction, -1 infraduction, full adduction OD. Full OS.   Confrontational Visual Field (CVF): Full OU  Color Plates: 10/12, 10/12  +RTR OD    Pen Light Exam (PLE)  External: periorbital edema and erythema OD, flat OS  Lids/Lashes/Lacrimal Ducts: 3+ lid edema and erythema OD - unable to open eye spontaneously. Flat OS  Sclera/Conjunctiva: 2+ injection, 1+ chemosis OD. W+Q OS  Cornea: Cl OU  Anterior Chamber: D+F OU    Iris: Flat OU  Lens: NS OU    Fundus Exam: dilated with 1% tropicamide and 2.5% phenylephrine  Approval obtained from primary team for dilation  Patient aware that pupils can remained dilated for at least 4-6 hours  Exam performed with 20D lens    Vitreous: wnl OU  Disc, cup/disc: sharp and pink, 0.2 OU  Macula: wnl OU  Vessels: wnl OU  Periphery: wnl OU    Labs/Imaging:  < from: CT Orbit w/ IV Cont (01.11.23 @ 20:18) >    IMPRESSION: Right periorbital cellulitis with post septal extension as   described above. There is associated mild right nasopharyngeal. No   discrete fluid collection.    There is acutemaxillary sinusitis.      < end of copied text >     [facility] DEPARTMENT OF OPHTHALMOLOGY - INITIAL ADULT CONSULT  -----------------------------------------------------------------------------------------------------------  Kerline Gold MD PGY-3  -----------------------------------------------------------------------------------------------------------    HPI: 47F PMH Downs syndrome, Duodenitis/Gastritis , Gilbert's syndrome, Hypothyroid, FE anemia, OCS, Ovarian cyst, ASD sp repair, Anxiety, Celiac dz presenting for periorbital swelling OD of 2 days duration. History obtained from father at bedside. Noted swelling started 2 days ago, went to PCP and was prescribed Zpak. Swelling did not improve and pt went to see ophthalmologist Dr. Kayla Clinton today who diagnosed pt with preseptal cellulitis. POH cataracts OD>OS (vision OD documented 20/50), blepharitis, LAWRENCE, congenital nystagmus. Zpak was dc'd by Dr. Clinton and pt was started on Keflex. Periorbital swelling continued to worsen and father brought pt to ED. Reports URI symptoms for past 2 weeks. Denies fevers. Pt lives in group home. Reports eye pain OD.       PAST MEDICAL & SURGICAL HISTORY:  Down syndrome      Celiac disease      Hypothyroid      ASD (atrial septal defect)      MRSA (methicillin resistant Staphylococcus aureus)  MSSA/MRSA detected in Nose culture      Lactose intolerance      Macrocytic anemia      Acquired subluxation of left patella, sequela      Gilbert&#x27;s syndrome      Iron deficiency anemia, unspecified iron deficiency anemia type      Cyst of ovary, unspecified laterality      Sleep disturbance      Obsessive compulsive disorder      History of pseudoseizure      Duodenitis      Syncope, unspecified syncope type      Gastritis      Afferent loop syndrome      Anxiety      Acute depression      Gluten free diet      S/P tonsillectomy      ASD (atrial septal defect)  ASD repair Oct 2001      H/O hernia repair  2002, 2009 with mesh      History of colon surgery  duodeno [id number]      History of colon surgery  revision, gastro jejunostomy and lysis of adhesions        Past Ocular History: denies surg/laser  Ophthalmic Medications: none  FAMILY HISTORY:  Family history of Alzheimer&#x27;s disease (Mother)    No family history of COPD (Mother)   no glc/amd  Social History: no etoh/tobacco    MEDICATIONS  (STANDING):  ampicillin/sulbactam  IVPB      ampicillin/sulbactam  IVPB 3 Gram(s) IV Intermittent every 6 hours  vancomycin  IVPB. 1000 milliGRAM(s) IV Intermittent once    MEDICATIONS  (PRN):    Allergies & Intolerances:   Celiac (Unknown)    Review of Systems:  Constitutional: No fever, chills  Eyes: No blurry vision, flashes, floaters, FBS. +erythema, +discharge, no double vision, OU  Neuro: No tremors  Cardiovascular: No chest pain, palpitations  Respiratory: No SOB, no cough, +URI symptoms  GI: No nausea, vomiting, abdominal pain  : No dysuria  Skin: no rash  Psych: no depression  Endocrine: no polyuria, polydipsia  Heme/lymph: no swelling    VITALS: T(C): 37.2 (01-11-23 @ 23:14)  T(F): 98.9 (01-11-23 @ 23:14), Max: 99 (01-11-23 @ 17:36)  HR: 93 (01-11-23 @ 23:14) (69 - 93)  BP: 107/- (01-11-23 @ 23:14) (95/64 - 107/-)  RR:  (18 - 21)  SpO2:  (95% - 100%)  Wt(kg): --  General: AAO x 3, appropriate mood and affect    Ophthalmology Exam:  Visual acuity (sc): 20/30 OD, 20/30 OS  Pupils: PERRL OU, ? trace APD OD? - sluggishly reactive OD  Ttono: 24, 10  Extraocular movements (EOMs): Difficult to assess due to pt cooperativity: -3 abduction, -3 supraduction, -1 infraduction, full adduction OD. Full OS.   Confrontational Visual Field (CVF): Full OU  Color Plates: 10/12, 10/12  +RTR OD    Pen Light Exam (PLE)  External: periorbital edema and erythema OD, flat OS  Lids/Lashes/Lacrimal Ducts: 3+ lid edema and erythema OD - unable to open eye spontaneously. Flat OS  Sclera/Conjunctiva: 2+ injection, 1+ chemosis OD. W+Q OS  Cornea: Cl OU  Anterior Chamber: D+F OU    Iris: Flat OU  Lens: NS OU    Fundus Exam: dilated with 1% tropicamide and 2.5% phenylephrine  Approval obtained from primary team for dilation  Patient aware that pupils can remained dilated for at least 4-6 hours  Exam performed with 20D lens    Vitreous: wnl OU  Disc, cup/disc: ?trace optic nerve edema OD, sharp and pink OS, 0.2 OU  Macula: wnl OU  Vessels: wnl OU  Periphery: difficult view 2/2 pt cooperation, SRF temporally OD, wnl OS    Labs/Imaging:  < from: CT Orbit w/ IV Cont (01.11.23 @ 20:18) >    IMPRESSION: Right periorbital cellulitis with post septal extension as   described above. There is associated mild right nasopharyngeal. No   discrete fluid collection.    There is acutemaxillary sinusitis.      < end of copied text >

## 2023-01-12 NOTE — H&P ADULT - PROBLEM SELECTOR PLAN 2
-appreciate ENT recommendations  -c/w Unasyn   -f/u sinus cultures  -Decadron 10mg q8h x3 doses   -afrin 1 spray, bilateral, BID x3 days  -NS nasal spray bilateral, TID x 3 weeks

## 2023-01-12 NOTE — CHART NOTE - NSCHARTNOTEFT_GEN_A_CORE
Pt seen and examined at bedside. Spoke with father at bedside. Will order gluten free diet for hx of celiac disease. ID consult as requested by ENT. Continue abx.
Per Ophtho resident Dr. Connell, patient presentation more consistent with scleritis than orbital cellulitis despite maxillary sinusitis. Recommended syphilis screen and increasing steroid treatment 500 mg solumedrol starting 1/13. Orders placed. C/w antibiotics.

## 2023-01-12 NOTE — H&P ADULT - PROBLEM SELECTOR PLAN 4
-hold home fludrocortisone while getting dexamethasone, restart fludrocortisone in 1/13  -c/w home midodrine PRN -c/w home Tirosint-sol 325mcg (confirmed dose with father, use Synthroid inpatient)  -obtain TSH

## 2023-01-12 NOTE — ED ADULT NURSE NOTE - NSIMPLEMENTINTERV_GEN_ALL_ED
Implemented All Fall with Harm Risk Interventions:  [place] to call system. Call bell, personal items and telephone within reach. Instruct patient to call for assistance. Room bathroom lighting operational. Non-slip footwear when patient is off stretcher. Physically safe environment: no spills, clutter or unnecessary equipment. Stretcher in lowest position, wheels locked, appropriate side rails in place. Provide visual cue, wrist band, yellow gown, etc. Monitor gait and stability. Monitor for mental status changes and reorient to person, place, and time. Review medications for side effects contributing to fall risk. Reinforce activity limits and safety measures with patient and family. Provide visual clues: red socks.

## 2023-01-12 NOTE — ED ADULT NURSE NOTE - NSICDXPASTSURGICALHX_GEN_ALL_CORE_FT
PAST SURGICAL HISTORY:  ASD (atrial septal defect) ASD repair Oct 2001    H/O hernia repair 2002, 2009 with mesh    History of colon surgery duodeno [id number]    History of colon surgery revision, gastro jejunostomy and lysis of adhesions    S/P tonsillectomy

## 2023-01-12 NOTE — H&P ADULT - HISTORY OF PRESENT ILLNESS
46yo F w/ PMHx of Down Syndrome, Duodenitis/Gastritis , Gilbert's syndrome, Hypothyroid, Fe deficiency anemia, Ovarian cyst, ASD s/p repair, Anxiety, Celiac disease, presents with right eye pain, history obtained from father Aubrey via telephone, pt lives in group home and spends time with him 2-3 days per week, he reports that the patient for 2 weeks had right eye redness, that was unchanged but starting 2 days ago she developed eyelid swelling, eye discharge, and pain with eye movements, was prescribed azithromycin but outpatient provider, pt then went to ophthalmologist who referred pt to the ED for concern of orbital cellulitis, in the ED, VSS, labs unremarkable, CT orbit was notable for right orbital cellulitis w/ post-septal extension, ENT and ophthalmology were consulted in the ED, pt was given vancomcyin/Unasyn, eye drops, admitted to general medicine for further management.

## 2023-01-12 NOTE — H&P ADULT - PROBLEM SELECTOR PLAN 6
dvt ppx: lovenox  diet: NPO except medications  ambulate: with assistance  gi ppx: home ppi    fall precautions   aspiration precautions

## 2023-01-12 NOTE — PROGRESS NOTE ADULT - SUBJECTIVE AND OBJECTIVE BOX
ENT ISSUE/POD: Right Maxillary sinusitis.    HPI: 48 YO F with PMH of Downs syndrome, Duodenitis/Gastritis , Gilbert's syndrome, Hypothyroid, FE anemia, OCS, Ovarian cyst, ASD sp repair, Anxiety, Celiac dz presenting for Right  periorbital swelling and redness of 2 days duration. History obtained from father at bedside. Noted swelling started 2 days ago, went to PCP and was prescribed Zpak. Swelling did not improve and pt went to see ophthalmologist Dr. Kayla Clinton today who diagnosed pt with preseptal cellulitis. ENT was consulted for evaluation of Right Maxillary sinus opacification found on CT of [place]bits. Pt reports runny nose, nasal congestion for past 2 weeks. Pt denies fever/ chills, cough, sore throat, facial pain/pressure, HA/Dizziness/Blurry vision, fever/chills, recent travel/sick contacts.             PAST MEDICAL & SURGICAL HISTORY:  Down syndrome      Celiac disease      Hypothyroid      ASD (atrial septal defect)      MRSA (methicillin resistant Staphylococcus aureus)  MSSA/MRSA detected in Nose culture      Lactose intolerance      Macrocytic anemia      Acquired subluxation of left patella, sequela      Gilbert&#x27;s syndrome      Iron deficiency anemia, unspecified iron deficiency anemia type      Cyst of ovary, unspecified laterality      Sleep disturbance      Obsessive compulsive disorder      History of pseudoseizure      Duodenitis      Syncope, unspecified syncope type      Gastritis      Afferent loop syndrome      Anxiety      Acute depression      Gluten free diet      S/P tonsillectomy      ASD (atrial septal defect)  ASD repair Oct 2001      H/O hernia repair  2002, 2009 with mesh      History of colon surgery  duodeno [id number]      History of colon surgery  revision, gastro jejunostomy and lysis of adhesions        Allergies    Celiac (Unknown)  No Known Drug Allergies    Intolerances      MEDICATIONS  (STANDING):  ampicillin/sulbactam  IVPB      ampicillin/sulbactam  IVPB 3 Gram(s) IV Intermittent every 6 hours  ascorbic acid 500 milliGRAM(s) Oral daily  calcium carbonate 1250 mG  + Vitamin D (OsCal 500 + D) 1 Tablet(s) Oral daily  cyanocobalamin 1000 MICROGram(s) Oral daily  dexAMETHasone  IVPB 10 milliGRAM(s) IV Intermittent every 8 hours  dexamethasone/neomycin/polymyxin Ointment 1 Application(s) Right EYE three times a day  dorzolamide 2%/timolol 0.5% Ophthalmic Solution 1 Drop(s) Right EYE every 12 hours  enoxaparin Injectable 40 milliGRAM(s) SubCutaneous every 24 hours  ergocalciferol 92684 Unit(s) Oral every week  folic acid 1 milliGRAM(s) Oral daily  levothyroxine 300 MICROGram(s) Oral daily  oxymetazoline 0.05% Nasal Spray 1 Spray(s) Both Nostrils two times a day  pantoprazole    Tablet 40 milliGRAM(s) Oral two times a day  saccharomyces boulardii 250 milliGRAM(s) Oral two times a day  sodium chloride 0.65% Nasal 1 Spray(s) Both Nostrils three times a day  sucralfate 1 Gram(s) Oral two times a day  vancomycin  IVPB 1000 milliGRAM(s) IV Intermittent every 12 hours    MEDICATIONS  (PRN):  midodrine. 10 milliGRAM(s) Oral three times a day PRN SBP < 110      Social History: see consult    Family history: see consult    ROS:   ENT: all negative except as noted in HPI   Pulm: denies SOB, cough, hemoptysis  Neuro: denies numbness/tingling, loss of sensation  Endo: denies heat/cold intolerance, excessive sweating      Vital Signs Last 24 Hrs  T(C): 36.3 (12 Jan 2023 10:10), Max: 37.2 (11 Jan 2023 17:36)  T(F): 97.4 (12 Jan 2023 10:10), Max: 99 (11 Jan 2023 17:36)  HR: 86 (12 Jan 2023 10:10) (69 - 94)  BP: 104/64 (12 Jan 2023 10:10) (95/64 - 120/63)  BP(mean): --  RR: 19 (12 Jan 2023 10:10) (18 - 21)  SpO2: 94% (12 Jan 2023 10:10) (93% - 100%)    Parameters below as of 12 Jan 2023 10:10  Patient On (Oxygen Delivery Method): room air                              12.0   6.23  )-----------( 313      ( 12 Jan 2023 08:59 )             36.3    01-12    140  |  99  |  11  ----------------------------<  117<H>  3.4<L>   |  31  |  0.65    Ca    8.6      12 Jan 2023 08:59  Phos  3.6     01-12  Mg     2.1     01-12    TPro  6.8  /  Alb  3.0<L>  /  TBili  0.6  /  DBili  x   /  AST  20  /  ALT  28  /  AlkPhos  134<H>  01-12       PHYSICAL EXAM:  Gen: NAD, On RA.   Skin: No rashes, bruises, or lesions  Head: Normocephalic, Atraumatic  Face: no edema, erythema, or fluctuance. Parotid glands soft without mass  Eyes: Right periorbita erythema/edema, unable to open eye spontaneously, TTP.   Nose: Nares bilaterally patent, no discharge  Mouth: No Stridor / Drooling / Trismus.  Mucosa moist, tongue/uvula midline, oropharynx clear  Neck: Flat, supple, no lymphadenopathy, trachea midline, no masses  Lymphatic: No lymphadenopathy  Resp: breathing easily, no stridor  CV: no peripheral edema/cyanosis  GI: nondistended   Peripheral vascular: no JVD or edema  Neuro: facial nerve intact, no facial droop

## 2023-01-12 NOTE — PROGRESS NOTE ADULT - SUBJECTIVE AND OBJECTIVE BOX
[facility] DEPARTMENT OF OPHTHALMOLOGY  ------------------------------------------------------------------------------    Interval History: Pt reports feeling better since this AM.     MEDICATIONS  (STANDING):  ampicillin/sulbactam  IVPB      ampicillin/sulbactam  IVPB 3 Gram(s) IV Intermittent every 6 hours  ascorbic acid 500 milliGRAM(s) Oral daily  calcium carbonate 1250 mG  + Vitamin D (OsCal 500 + D) 1 Tablet(s) Oral daily  cyanocobalamin 1000 MICROGram(s) Oral daily  dexAMETHasone  IVPB 10 milliGRAM(s) IV Intermittent every 8 hours  dexamethasone/neomycin/polymyxin Ointment 1 Application(s) Right EYE three times a day  dorzolamide 2%/timolol 0.5% Ophthalmic Solution 1 Drop(s) Right EYE every 12 hours  enoxaparin Injectable 40 milliGRAM(s) SubCutaneous every 24 hours  ergocalciferol 15181 Unit(s) Oral every week  folic acid 1 milliGRAM(s) Oral daily  levothyroxine 300 MICROGram(s) Oral daily  oxymetazoline 0.05% Nasal Spray 1 Spray(s) Both Nostrils two times a day  pantoprazole    Tablet 40 milliGRAM(s) Oral two times a day  saccharomyces boulardii 250 milliGRAM(s) Oral two times a day  sodium chloride 0.65% Nasal 1 Spray(s) Both Nostrils three times a day  sucralfate 1 Gram(s) Oral two times a day  vancomycin  IVPB 1000 milliGRAM(s) IV Intermittent every 12 hours    MEDICATIONS  (PRN):  midodrine. 10 milliGRAM(s) Oral three times a day PRN SBP < 110      VITALS: T(C): 36.3 (01-12-23 @ 16:36)  T(F): 97.4 (01-12-23 @ 16:36), Max: 98.9 (01-11-23 @ 23:14)  HR: 74 (01-12-23 @ 16:36) (74 - 94)  BP: 110/74 (01-12-23 @ 16:36) (101/65 - 120/63)  RR:  (16 - 21)  SpO2:  (93% - 100%)  Wt(kg): --  General: AAO x 2, Down syndrome    Ophthalmology Exam:  Visual acuity (sc): 20/50 OD  Pupils: PERRL OU, no APD  Ttono: 15 OD  Extraocular movements (EOMs): -1 all directions OD, Full OS    Pen Light Exam (PLE)  External: periorbital edema and erythema OD, flat OS  Lids/Lashes/Lacrimal Ducts: 2+ lid edema and erythema OD - now able to open eye spontaneously. Flat OS  Sclera/Conjunctiva: 2+ injection, 1+ chemosis OD. W+Q OS  Cornea: Cl OU  Anterior Chamber: D+F OU    Iris: Flat OU  Lens: NS OU

## 2023-01-12 NOTE — H&P ADULT - NSICDXPASTSURGICALHX_GEN_ALL_CORE_FT
PAST SURGICAL HISTORY:  ASD (atrial septal defect) ASD repair Oct 2001    H/O hernia repair 2002, 2009 with mesh    History of colon surgery duodeno [id number]    History of colon surgery revision, gastro jejunostomy and lysis of adhesions    S/P tonsillectomy

## 2023-01-12 NOTE — CONSULT NOTE ADULT - ASSESSMENT
47 year old Female with PMHx of Down Syndrome, Duodenitis/Gastritis, Gilbert's syndrome, Hypothyroidism, Ovarian cyst, ASD s/p repair, Anxiety, Celiac disease, sent from Group home on 1/12 with right eye pain and swelling.    Afebrile  No leukocytosis   CT Orbits: Right periorbital cellulitis with post septal extension. Acute maxillary sinusitis.  UA: WBC 14, large blood and , neg LE, neg Nitrite  CXR: no consolidations  ENT consulted who performed nasal endoscopy Right Anterior nare nasal crusting. ? Purulent discharge, cultures sent   Opthalmology consulted right scleritis vs. idiopathic orbital inflammation, low concern for infectious orbital cellulitis, recommended workup for scleritis     #R eye redness, swelling with Evidence of R Orbital cellulitis on CT orbits likely 2/2 to acute maxillary sinusitis however with low suspicion for infectious orbital cellulitis per opthalmology     Recommendations:  Continue Vancomycin   Can continue Unasyn for sinusitis coverage awaiting cultures   Send MRSA PCR   F/up sinus Culture   F/up Blood Cx sent on 1/11  Steroids as per ENT     PT TO BE SEEN. PRELIM NOTE  PENDING RECS. PLEASE WAIT FOR FINAL RECS AFTER DISCUSSION WITH ATTENDING#           47 year old Female with PMHx of Down Syndrome, Duodenitis/Gastritis, Gilbert's syndrome, Hypothyroidism, Ovarian cyst, ASD s/p repair, Anxiety, Celiac disease, sent from Group home on 1/12 with right eye pain and swelling.    Afebrile  No leukocytosis   CT Orbits: Right periorbital cellulitis with post septal extension. Acute maxillary sinusitis.  UA: WBC 14, large blood and , neg LE, neg Nitrite  CXR: no consolidations  ENT consulted who performed nasal endoscopy Right Anterior nare nasal crusting. ? Purulent discharge, cultures sent   Opthalmology consulted right scleritis vs. idiopathic orbital inflammation, low concern for infectious orbital cellulitis, recommended workup for scleritis     #R eye redness, swelling with Evidence of R periOrbital cellulitis with post septal extension on CT orbits likely 2/2 to acute maxillary sinusitis however with low suspicion for infectious orbital cellulitis per opthalmology     Recommendations:  Continue Vancomycin 1 g q12hrs   F/up Vanco trough   Can continue Unasyn for sinusitis coverage awaiting cultures   Send MRSA PCR   F/up sinus Culture   F/up Blood Cx sent on 1/11  Steroids as per ENT     Seen and discussed with Dr Alfredo Cali MD, PGY4  ID fellow  [employer] Teams Preferred  After 5pm/weekends call [phone]

## 2023-01-12 NOTE — PATIENT PROFILE ADULT - FALL HARM RISK - HARM RISK INTERVENTIONS
Assistance with ambulation/Assistance OOB with selected safe patient handling equipment/Communicate Risk of Fall with Harm to all staff/Discuss with provider need for PT consult/Monitor for mental status changes/Monitor gait and stability/Move patient closer to nurses' station/Reinforce activity limits and safety measures with patient and family/Reorient to person, place and time as needed/Tailored Fall Risk Interventions/Toileting schedule using arm’s reach rule for commode and bathroom/Use of alarms - bed, chair and/or voice tab/Visual Cue: Yellow wristband and red socks/Bed in lowest position, wheels locked, appropriate side rails in place/Call bell, personal items and telephone in reach/Instruct patient to call for assistance before getting out of bed or chair/Non-slip footwear when patient is out of bed/[place] to call system/Physically safe environment - no spills, clutter or unnecessary equipment/Purposeful Proactive Rounding/Room/bathroom lighting operational, light cord in reach

## 2023-01-12 NOTE — H&P ADULT - PROBLEM SELECTOR PLAN 1
-appreciate ophthalmology recommendations  - -appreciate ophthalmology recommendations  -c/w Vancomycin and Unasyn  -c/w Maxitrol TID and Cosopt BID  -f/u blood cultures  -keep NPO

## 2023-01-12 NOTE — H&P ADULT - ASSESSMENT
48yo F w/ PMHx of Down Syndrome, Duodenitis/Gastritis , Gilbert's syndrome, Hypothyroid, Fe deficiency anemia, Ovarian cyst, ASD s/p repair, Anxiety, Celiac disease, presents with right eye pain, found to have orbital cellulitis

## 2023-01-12 NOTE — H&P ADULT - NSHPADDITIONALINFOADULT_GEN_ALL_CORE
I spent 75 minutes on this encounter, including clinical care at bedside, discussion with family members and consultants, reviewing labs and imaging and other results and clinical documentation

## 2023-01-12 NOTE — PROGRESS NOTE ADULT - ASSESSMENT
47y female w/ pmhx/ochx of Downs syndrome, Duodenitis/Gastritis, Gilbert's syndrome, Hypothyroid, FE anemia, OCS, Ovarian cyst, ASD sp repair, Anxiety, Celiac dz, cataracts OD>OS, blepharitis, [place], congenital nystagmus presenting for periorbital swelling OD of 2 days duration not improved on PO abx, found to have infectious vs. inflammatory scleritis vs. IOI. Less likely infectious orbital cellulitis.    # right scleritis vs. idiopathic orbital inflammation  - clinically with EOM restrictions and resistance to retropulsion. CT orbits with thickened sclera orbital, fat stranding, no abscess noted.   - EOM and lid edema now improving since this AM. IOP also improved.   - DFE limited due to pt cooperation - possible trace optic nerve edema and temporal subretinal fluid vs. choroidal. B scan with thickened sclera and shallow choroidals indicative of scleritis.   - At this time, lower suspicion for infectious orbital cellulitis. No fat stranding extraconally near sinuses.  - ddx broad: infectious vs. inflammatory vs. idiopathic etiology  - send RF, ACE, lysozyme, cANCA, pANCA, ESR, CRP, IgG4, KAVON. CXR w/o hilar lymphadenopathy.  - add syphilis screen  - continue broad spectrum antibiotics per primary team  - given higher suspicion for scleritis, would be more aggressive with steroids  - Start solumedrol 500mg QD starting tomorrow.   - c/w maxitrol ointment TID right eye, cosopt BID right eye (ordered)   - ENT consult appreciated - sinus cx pending  - Please let Ophthalmology know if any acute changes occur.    Pt discussed with Dr. Hallman, oculoplastics attending.    Outpatient follow-up: Patient should follow-up with his/her ophthalmologist or with [facility] Department of Ophthalmology at the address below     [address] [place] [address]. [address]  [place] [address]  [phone]

## 2023-01-12 NOTE — H&P ADULT - NSHPLABSRESULTS_GEN_ALL_CORE
Personally reviewed labs, imaging and ekg     EKG Personally reviewed labs, imaging and ekg     EKG: difficult to find associated between p-wave and QRS, prior EKG from 2016 normal Personally reviewed labs, imaging and ekg     EKG: difficult to find associated between p-wave and QRS, discussed with cardiology fellow on call, repeat ekg showed likely PACs, prior EKG from 2016 normal

## 2023-01-12 NOTE — PROGRESS NOTE ADULT - ASSESSMENT
48 YO F with PMH of Downs syndrome, Duodenitis/Gastritis , Gilbert's syndrome, Hypothyroid, FE anemia, OCS, Ovarian cyst, ASD sp repair, Anxiety, Celiac dz presenting for Right  periorbital swelling of 2 days duration went to see ophthalmologist on 1/11 who diagnosed pt with preseptal cellulitis. ENT was consulted for evaluation of Right Maxillary sinus opacification found on CT Orbits. Pt reports runny nose, nasal congestion for past 2 weeks. Findings are consistent with maxillary sinusitis. WBC: 8.93. Afebrile.

## 2023-01-12 NOTE — PROGRESS NOTE ADULT - SUBJECTIVE AND OBJECTIVE BOX
[facility] DEPARTMENT OF OPHTHALMOLOGY  ------------------------------------------------------------------------------  Kerline Gold MD PGY-3  ------------------------------------------------------------------------------    Interval History: Received 2 doses of Unasyn. Add vanc. Pt without new complaints.     MEDICATIONS  (STANDING):  ampicillin/sulbactam  IVPB      ampicillin/sulbactam  IVPB 3 Gram(s) IV Intermittent every 6 hours  ascorbic acid 500 milliGRAM(s) Oral daily  calcium carbonate 1250 mG  + Vitamin D (OsCal 500 + D) 1 Tablet(s) Oral daily  cyanocobalamin 1000 MICROGram(s) Oral daily  dexAMETHasone  IVPB 10 milliGRAM(s) IV Intermittent every 8 hours  dexamethasone/neomycin/polymyxin Ointment 1 Application(s) Right EYE three times a day  dorzolamide 2%/timolol 0.5% Ophthalmic Solution 1 Drop(s) Right EYE every 12 hours  enoxaparin Injectable 40 milliGRAM(s) SubCutaneous every 24 hours  ergocalciferol 71854 Unit(s) Oral every week  folic acid 1 milliGRAM(s) Oral daily  levothyroxine 325 MICROGram(s) Oral daily  oxymetazoline 0.05% Nasal Spray 1 Spray(s) Both Nostrils two times a day  pantoprazole    Tablet 40 milliGRAM(s) Oral two times a day  saccharomyces boulardii 250 milliGRAM(s) Oral two times a day  sodium chloride 0.65% Nasal 1 Spray(s) Both Nostrils three times a day  sucralfate 1 Gram(s) Oral two times a day  vancomycin  IVPB 1000 milliGRAM(s) IV Intermittent every 12 hours    MEDICATIONS  (PRN):  midodrine. 10 milliGRAM(s) Oral three times a day PRN SBP < 110      VITALS: T(C): 36.7 (01-12-23 @ 05:24)  T(F): 98.1 (01-12-23 @ 05:24), Max: 99 (01-11-23 @ 17:36)  HR: 85 (01-12-23 @ 05:24) (69 - 94)  BP: 101/65 (01-12-23 @ 05:24) (95/64 - 120/63)  RR:  (18 - 21)  SpO2:  (93% - 100%)  Wt(kg): --  General: AAOx 3    Ophthalmology Exam:  Visual acuity (sc): 20/40 OD, 20/50 OS  Pupils: pharmacologically dilated  Ttono: 27 OD  Extraocular movements (EOMs): -3 abduction, -3 supraduction, -1 infraduction, trace adduction deficits OD. Full OS.   Color Plates: 10/12 OD  +RTR OD    Pen Light Exam (PLE)  External: periorbital edema and erythema OD, flat OS  Lids/Lashes/Lacrimal Ducts: 3+ lid edema and erythema OD - unable to open eye spontaneously. Flat OS  Sclera/Conjunctiva: 2+ injection, 1+ chemosis OD. W+Q OS  Cornea: Cl OU  Anterior Chamber: D+F OU    Iris: Flat OU  Lens: NS OU    Fundus Exam: dilated with 1% tropicamide and 2.5% phenylephrine  Approval obtained from primary team for dilation  Patient aware that pupils can remained dilated for at least 4-6 hours  Exam performed with 20D lens    Vitreous: wnl OU  Disc, cup/disc: sharp and pink, 0.2 OU  Macula: wnl OU  Vessels: wnl OU  Periphery: wnl OU [facility] DEPARTMENT OF OPHTHALMOLOGY  ------------------------------------------------------------------------------  Kerline Gold MD PGY-3  ------------------------------------------------------------------------------    Interval History: Received 2 doses of Unasyn. Add vanc. Pt without new complaints.     MEDICATIONS  (STANDING):  ampicillin/sulbactam  IVPB      ampicillin/sulbactam  IVPB 3 Gram(s) IV Intermittent every 6 hours  ascorbic acid 500 milliGRAM(s) Oral daily  calcium carbonate 1250 mG  + Vitamin D (OsCal 500 + D) 1 Tablet(s) Oral daily  cyanocobalamin 1000 MICROGram(s) Oral daily  dexAMETHasone  IVPB 10 milliGRAM(s) IV Intermittent every 8 hours  dexamethasone/neomycin/polymyxin Ointment 1 Application(s) Right EYE three times a day  dorzolamide 2%/timolol 0.5% Ophthalmic Solution 1 Drop(s) Right EYE every 12 hours  enoxaparin Injectable 40 milliGRAM(s) SubCutaneous every 24 hours  ergocalciferol 55431 Unit(s) Oral every week  folic acid 1 milliGRAM(s) Oral daily  levothyroxine 325 MICROGram(s) Oral daily  oxymetazoline 0.05% Nasal Spray 1 Spray(s) Both Nostrils two times a day  pantoprazole    Tablet 40 milliGRAM(s) Oral two times a day  saccharomyces boulardii 250 milliGRAM(s) Oral two times a day  sodium chloride 0.65% Nasal 1 Spray(s) Both Nostrils three times a day  sucralfate 1 Gram(s) Oral two times a day  vancomycin  IVPB 1000 milliGRAM(s) IV Intermittent every 12 hours    MEDICATIONS  (PRN):  midodrine. 10 milliGRAM(s) Oral three times a day PRN SBP < 110      VITALS: T(C): 36.7 (01-12-23 @ 05:24)  T(F): 98.1 (01-12-23 @ 05:24), Max: 99 (01-11-23 @ 17:36)  HR: 85 (01-12-23 @ 05:24) (69 - 94)  BP: 101/65 (01-12-23 @ 05:24) (95/64 - 120/63)  RR:  (18 - 21)  SpO2:  (93% - 100%)  Wt(kg): --  General: AAOx 3    Ophthalmology Exam:  Visual acuity (sc): 20/40 OD, 20/50 OS  Pupils: pharmacologically dilated  Ttono: 27 OD  Extraocular movements (EOMs): -3 abduction, -3 supraduction, -1 infraduction, trace adduction deficits OD. Full OS.   Color Plates: 10/12 OD  +RTR OD    Pen Light Exam (PLE)  External: periorbital edema and erythema OD, flat OS  Lids/Lashes/Lacrimal Ducts: 3+ lid edema and erythema OD - unable to open eye spontaneously. Flat OS  Sclera/Conjunctiva: 2+ injection, 1+ chemosis OD. W+Q OS  Cornea: Cl OU  Anterior Chamber: D+F OU    Iris: Flat OU  Lens: NS OU   [facility] DEPARTMENT OF OPHTHALMOLOGY  ------------------------------------------------------------------------------  Kerline Gold MD PGY-3  ------------------------------------------------------------------------------    Interval History: Received 2 doses of Unasyn. Add vanc. Pt without new complaints.     MEDICATIONS  (STANDING):  ampicillin/sulbactam  IVPB      ampicillin/sulbactam  IVPB 3 Gram(s) IV Intermittent every 6 hours  ascorbic acid 500 milliGRAM(s) Oral daily  calcium carbonate 1250 mG  + Vitamin D (OsCal 500 + D) 1 Tablet(s) Oral daily  cyanocobalamin 1000 MICROGram(s) Oral daily  dexAMETHasone  IVPB 10 milliGRAM(s) IV Intermittent every 8 hours  dexamethasone/neomycin/polymyxin Ointment 1 Application(s) Right EYE three times a day  dorzolamide 2%/timolol 0.5% Ophthalmic Solution 1 Drop(s) Right EYE every 12 hours  enoxaparin Injectable 40 milliGRAM(s) SubCutaneous every 24 hours  ergocalciferol 22295 Unit(s) Oral every week  folic acid 1 milliGRAM(s) Oral daily  levothyroxine 325 MICROGram(s) Oral daily  oxymetazoline 0.05% Nasal Spray 1 Spray(s) Both Nostrils two times a day  pantoprazole    Tablet 40 milliGRAM(s) Oral two times a day  saccharomyces boulardii 250 milliGRAM(s) Oral two times a day  sodium chloride 0.65% Nasal 1 Spray(s) Both Nostrils three times a day  sucralfate 1 Gram(s) Oral two times a day  vancomycin  IVPB 1000 milliGRAM(s) IV Intermittent every 12 hours    MEDICATIONS  (PRN):  midodrine. 10 milliGRAM(s) Oral three times a day PRN SBP < 110      VITALS: T(C): 36.7 (01-12-23 @ 05:24)  T(F): 98.1 (01-12-23 @ 05:24), Max: 99 (01-11-23 @ 17:36)  HR: 85 (01-12-23 @ 05:24) (69 - 94)  BP: 101/65 (01-12-23 @ 05:24) (95/64 - 120/63)  RR:  (18 - 21)  SpO2:  (93% - 100%)  Wt(kg): --  General: AAOx 3    Ophthalmology Exam:  Visual acuity (sc): 20/40 OD, 20/50 OS  Pupils: pharmacologically dilated  Ttono: 27 OD  Extraocular movements (EOMs): -3 abduction, -3 supraduction, -1 infraduction, trace adduction deficits OD. Full OS.   Color Plates: 10/12 OD  +RTR OD    Pen Light Exam (PLE)  External: periorbital edema and erythema OD, flat OS  Lids/Lashes/Lacrimal Ducts: 3+ lid edema and erythema OD - unable to open eye spontaneously. Flat OS  Sclera/Conjunctiva: 2+ injection, 1+ chemosis OD. W+Q OS  Cornea: Cl OU  Anterior Chamber: D+F OU    Iris: Flat OU  Lens: NS OU    Fundus Exam: dilated with 1% tropicamide and 2.5% phenylephrine  Approval obtained from primary team for dilation  Patient aware that pupils can remained dilated for at least 4-6 hours  Exam performed with 20D lens    Vitreous: wnl OU  Disc, cup/disc: ?trace optic nerve edema OD, sharp and pink OS, 0.2 OU  Macula: wnl OU  Vessels: wnl OU  Periphery: difficult view 2/2 pt cooperation, SRF temporally OD, wnl OS    B scan: temporal and nasal shallow choroidals vs. SRF. Scleral thickening.

## 2023-01-12 NOTE — CONSULT NOTE ADULT - ASSESSMENT
Assessment and Recommendations:  47y female w/ pmhx/ochx of *** consulted for ***, found to have ***    # orbital cellulitis OD    ***INCOMPLETE NOTE***     Outpatient follow-up: Patient should follow-up with his/her ophthalmologist or with [facility] Department of Ophthalmology at the address below     [address] [place] [address]. [address]  [place] [address]  [phone] Assessment and Recommendations:  47y female w/ pmhx/ochx of Downs syndrome, Duodenitis/Gastritis, Gilbert's syndrome, Hypothyroid, FE anemia, OCS, Ovarian cyst, ASD sp repair, Anxiety, Celiac dz, cataracts OD>OS, blepharitis, LAWRENCE, congenital nystagmus presenting for periorbital swelling OD of 2 days duration not improved on PO abx, found to have orbital cellulitis.     # right orbital cellulitis  - clinically with EOM restrictions and resistance to retropulsion. CT orbits with orbital fat stranding, no abscess noted.   - likely 2/2 sinusitis. Pt with URI symptoms for past 2 weeks and acute maxillary sinusitis on CT  - continue broad spectrum antibiotics  - start maxitrol ointment TID right eye, cosopt BID right eye  - keep NPO until re-evaluation in AM  - ENT consult appreciated - sinus cx pending  - Findings discussed with primary team and father. Please let Ophthalmology know if any acute changes occur.      To be discussed with oculoplastics attending.     Outpatient follow-up: Patient should follow-up with his/her ophthalmologist or with [facility] Department of Ophthalmology at the address below     [address]  [place] [address]  [phone] Assessment and Recommendations:  47y female w/ pmhx/ochx of Downs syndrome, Duodenitis/Gastritis, Gilbert's syndrome, Hypothyroid, FE anemia, OCS, Ovarian cyst, ASD sp repair, Anxiety, Celiac dz, cataracts OD>OS, blepharitis, LAWRENCE, congenital nystagmus presenting for periorbital swelling OD of 2 days duration not improved on PO abx, found to have orbital cellulitis.     # right orbital cellulitis  - clinically with EOM restrictions and resistance to retropulsion. CT orbits with orbital fat stranding, no abscess noted.   - likely 2/2 sinusitis. Pt with URI symptoms for past 2 weeks and acute maxillary sinusitis on CT  - continue broad spectrum antibiotics  - start maxitrol ointment TID right eye, cosopt BID right eye (ordered)   - keep NPO until re-evaluation in AM  - ENT consult appreciated - sinus cx pending  - Findings discussed with primary team and father. Please let Ophthalmology know if any acute changes occur.      To be discussed with oculoplastics attending.     Outpatient follow-up: Patient should follow-up with his/her ophthalmologist or with [facility] Department of Ophthalmology at the address below     [address]. [address]  [place] [address]  [phone]

## 2023-01-12 NOTE — H&P ADULT - NSHPPHYSICALEXAM_GEN_ALL_CORE
Vital Signs Last 24 Hrs  T(C): 36.7 (12 Jan 2023 05:24), Max: 37.2 (11 Jan 2023 17:36)  T(F): 98.1 (12 Jan 2023 05:24), Max: 99 (11 Jan 2023 17:36)  HR: 85 (12 Jan 2023 05:24) (69 - 94)  BP: 101/65 (12 Jan 2023 05:24) (95/64 - 120/63)  BP(mean): --  RR: 18 (12 Jan 2023 05:24) (18 - 21)  SpO2: 93% (12 Jan 2023 05:24) (93% - 100%)    Parameters below as of 12 Jan 2023 05:24  Patient On (Oxygen Delivery Method): room air

## 2023-01-12 NOTE — PHARMACOTHERAPY INTERVENTION NOTE - COMMENTS
47 year old Female with PMHx of Down Syndrome, Duodenitis/Gastritis, Gilbert's syndrome, Hypothyroidism, Ovarian cyst, ASD s/p repair, Anxiety, Celiac disease, sent from Group home on 1/12 with right eye pain.     Patient current medication list:    MEDICATIONS  (STANDING):  ampicillin/sulbactam  IVPB      ampicillin/sulbactam  IVPB 3 Gram(s) IV Intermittent every 6 hours  ascorbic acid 500 milliGRAM(s) Oral daily  calcium carbonate 1250 mG  + Vitamin D (OsCal 500 + D) 1 Tablet(s) Oral daily  cyanocobalamin 1000 MICROGram(s) Oral daily  dexAMETHasone  IVPB 10 milliGRAM(s) IV Intermittent every 8 hours  dexamethasone/neomycin/polymyxin Ointment 1 Application(s) Right EYE three times a day  dorzolamide 2%/timolol 0.5% Ophthalmic Solution 1 Drop(s) Right EYE every 12 hours  enoxaparin Injectable 40 milliGRAM(s) SubCutaneous every 24 hours  ergocalciferol 11683 Unit(s) Oral every week  folic acid 1 milliGRAM(s) Oral daily  levothyroxine 300 MICROGram(s) Oral daily  oxymetazoline 0.05% Nasal Spray 1 Spray(s) Both Nostrils two times a day  pantoprazole    Tablet 40 milliGRAM(s) Oral two times a day  saccharomyces boulardii 250 milliGRAM(s) Oral two times a day  sodium chloride 0.65% Nasal 1 Spray(s) Both Nostrils three times a day  sucralfate 1 Gram(s) Oral two times a day  vancomycin  IVPB 1000 milliGRAM(s) IV Intermittent every 12 hours    MEDICATIONS  (PRN):  midodrine. 10 milliGRAM(s) Oral three times a day PRN SBP < 110    Patient's pmhx includes Celiac disease. Patient's father mentioned if pt. were to ingest gluten products she may experience upset stomach, nausea and vomiting. Patient's father was concerned that inpatient medications may contain gluten ingredients. Went through each inpatient medication (, active, inactive ingredients etc.) and assured the father non of the medications include gluten.      Nikkie Sanchez PharmD  PGY-1 Resident   [facility] [id number]/Teams

## 2023-01-12 NOTE — PROGRESS NOTE ADULT - ASSESSMENT
Assessment and Recommendations:  47y female w/ pmhx/ochx of Downs syndrome, Duodenitis/Gastritis, Gilbert's syndrome, Hypothyroid, FE anemia, OCS, Ovarian cyst, ASD sp repair, Anxiety, Celiac dz, cataracts OD>OS, blepharitis, LAWRENCE, congenital nystagmus presenting for periorbital swelling OD of 2 days duration not improved on PO abx, found to have infectious vs. inflammatory scleritis vs. IOI. Less likely infectious orbital cellulitis.    # scleritis vs. idiopathic orbital inflammation  - clinically with EOM restrictions and resistance to retropulsion. CT orbits with thickened sclera orbital, fat stranding, no abscess noted.   - B scan with thickened sclera and shallow choroidals indicative of scleritis. At this time, lower suspicion for infectious orbital cellulitis. No fat stranding extraconally near sinuses.  - ddx broad, infectious vs. inflammatory vs. idiopathic etiology  - send RF, ACE, lysozyme, cANCA, pANCA, ESR, CRP, IgG4, KAVON. CXR w/o hilar lymphadenopathy.  - continue broad spectrum antibiotics per primary team  - c/w IV decadron  - c/w maxitrol ointment TID right eye, cosopt BID right eye (ordered)   - pt can eat, no longer needs to be NPO  - ENT consult appreciated - sinus cx pending  - Please let Ophthalmology know if any acute changes occur.    Pt seen and discussed with Dr. Hallman, oculoplastics attending.    Outpatient follow-up: Patient should follow-up with his/her ophthalmologist or with [facility] Department of Ophthalmology at the address below     [address] [place] [address]. [address]  [place] [address]  [phone] Assessment and Recommendations:  47y female w/ pmhx/ochx of Downs syndrome, Duodenitis/Gastritis, Gilbert's syndrome, Hypothyroid, FE anemia, OCS, Ovarian cyst, ASD sp repair, Anxiety, Celiac dz, cataracts OD>OS, blepharitis, LAWRENCE, congenital nystagmus presenting for periorbital swelling OD of 2 days duration not improved on PO abx, found to have infectious vs. inflammatory scleritis vs. IOI. Less likely infectious orbital cellulitis.    # right scleritis vs. idiopathic orbital inflammation  - clinically with EOM restrictions and resistance to retropulsion. CT orbits with thickened sclera orbital, fat stranding, no abscess noted.   - DFE limited due to pt cooperation - possible trace optic nerve edema and temporal subretinal fluid vs. choroidal. B scan with thickened sclera and shallow choroidals indicative of scleritis. At this time, lower suspicion for infectious orbital cellulitis. No fat stranding extraconally near sinuses.  - ddx broad: infectious vs. inflammatory vs. idiopathic etiology  - send RF, ACE, lysozyme, cANCA, pANCA, ESR, CRP, IgG4, KAVON. CXR w/o hilar lymphadenopathy.  - continue broad spectrum antibiotics per primary team  - c/w IV decadron  - c/w maxitrol ointment TID right eye, cosopt BID right eye (ordered)   - pt can eat, no longer needs to be NPO  - ENT consult appreciated - sinus cx pending  - Please let Ophthalmology know if any acute changes occur.    Pt seen and discussed with Dr. Hallman, oculoplastics attending.    Outpatient follow-up: Patient should follow-up with his/her ophthalmologist or with [facility] Department of Ophthalmology at the address below     [address] [place] [address]. [address]  [place] [address]  [phone]

## 2023-01-12 NOTE — ED ADULT NURSE NOTE - OBJECTIVE STATEMENT
46y/o F hx of down syndrome coming to the ED c.o R eye swelling. AS per father, pt has 3 days of right eye irritation, redness, discharge & swelling, was given azithromycin and saw optho today with concern for preseptal cellulitis. Pt c.o pain when moving the eye around. Denies any CP/SOB/Fever/Chills/N/V/D. On exam, pt is breathing spontaneously, saturating 100% RA. Abdomen, soft, nontender, nondistended. IV placed, labs collected and sent. VSS.

## 2023-01-12 NOTE — CONSULT NOTE ADULT - SUBJECTIVE AND OBJECTIVE BOX
HPI:    47 year old Female with PMHx of Down Syndrome, Duodenitis/Gastritis, Gilbert's syndrome, Hypothyroidism, Ovarian cyst, ASD s/p repair, Anxiety, Celiac disease, sent from Group home on 1/12 with right eye pain.   2 weeks had right eye redness, that was unchanged but starting 2 days ago she developed eyelid swelling, eye discharge, and pain with eye movements, was prescribed azithromycin by outpatient provider, pt then went to ophthalmologist who referred pt to the ED for concern of orbital cellulitis.        CT orbit was notable for right orbital cellulitis w/ post-septal extension, ENT and ophthalmology were consulted in the ED, pt was given vancomcyin/Unasyn, eye drops, admitted to general medicine for further management.   (12 Jan 2023 05:04)       REVIEW OF SYSTEMS  [  ] ROS unobtainable because:    [  ] All other systems negative except as noted below    Constitutional:  [ ] fever [ ] chills  [ ] weight loss  [ ]night sweat  [ ]poor appetite/PO intake [ ]fatigue   Skin:  [ ] rash [ ] phlebitis	  Eyes: [ ] icterus [ ] pain  [ ] discharge	  ENMT: [ ] sore throat  [ ] thrush [ ] ulcers [ ] exudates [ ]anosmia  Respiratory: [ ] dyspnea [ ] hemoptysis [ ] cough [ ] sputum	  Cardiovascular:  [ ] chest pain [ ] palpitations [ ] edema	  Gastrointestinal:  [ ] nausea [ ] vomiting [ ] diarrhea [ ] constipation [ ] pain	  Genitourinary:  [ ] dysuria [ ] frequency [ ] hematuria [ ] discharge [ ] flank pain  [ ] incontinence  Musculoskeletal:  [ ] myalgias [ ] arthralgias [ ] arthritis  [ ] back pain  Neurological:  [ ] headache [ ] weakness [ ] seizures  [ ] confusion/altered mental status    prior hospital charts reviewed [V]  primary team notes reviewed [V]  other consultant notes reviewed [V]    PAST MEDICAL & SURGICAL HISTORY:  Down syndrome    Celiac disease    Hypothyroid    ASD (atrial septal defect)    MRSA (methicillin resistant Staphylococcus aureus)  MSSA/MRSA detected in Nose culture    Lactose intolerance    Macrocytic anemia    Acquired subluxation of left patella, sequela    Gilbert&#x27;s syndrome    Iron deficiency anemia, unspecified iron deficiency anemia type    Cyst of ovary, unspecified laterality    Sleep disturbance    Obsessive compulsive disorder    History of pseudoseizure    Duodenitis    Syncope, unspecified syncope type    Gastritis    Afferent loop syndrome    Anxiety    Acute depression    Gluten free diet    S/P tonsillectomy    ASD (atrial septal defect)  ASD repair Oct 2001    H/O hernia repair  2002, 2009 with mesh    History of colon surgery  duodeno [id number]    History of colon surgery  revision, gastro jejunostomy and lysis of adhesions    SOCIAL HISTORY:  - Denied smoking/vaping/alcohol/recreational drug use    FAMILY HISTORY:  Family history of Alzheimer&#x27;s disease (Mother)    No family history of COPD (Mother)    Allergies  Celiac (Unknown)  No Known Drug Allergies    ANTIMICROBIALS:  ampicillin/sulbactam  IVPB    ampicillin/sulbactam  IVPB 3 every 6 hours  vancomycin  IVPB 1000 every 12 hours    ANTIMICROBIALS (past 90 days):  MEDICATIONS  (STANDING):    ampicillin/sulbactam  IVPB   200 mL/Hr IV Intermittent (01-11-23 @ 23:01)    ampicillin/sulbactam  IVPB   200 mL/Hr IV Intermittent (01-12-23 @ 12:15)   200 mL/Hr IV Intermittent (01-12-23 @ 06:54)    vancomycin  IVPB.   250 mL/Hr IV Intermittent (01-12-23 @ 02:17)    OTHER MEDS:   MEDICATIONS  (STANDING):  dexAMETHasone  IVPB 10 every 8 hours  enoxaparin Injectable 40 every 24 hours  levothyroxine 300 daily  midodrine. 10 three times a day PRN  pantoprazole    Tablet 40 two times a day  sucralfate 1 two times a day    VITALS:  Vital Signs Last 24 Hrs  T(F): 97.4 (01-12-23 @ 10:10), Max: 99 (01-11-23 @ 17:36)    Vital Signs Last 24 Hrs  HR: 86 (01-12-23 @ 10:10) (69 - 94)  BP: 104/64 (01-12-23 @ 10:10) (95/64 - 120/63)  RR: 19 (01-12-23 @ 10:10)  SpO2: 94% (01-12-23 @ 10:10) (93% - 100%)  Wt(kg): --    EXAM:  Physical Exam:  Constitutional:  well preserved, comfortable  Head/Eyes: no icterus, PERRL, EOMI  ENT:  supple; no thrush  LUNGS:  CTA  CVS:  normal S1, S2, no murmur  Abd:  soft, non-tender; non-distended  Ext:  no edema  Vascular:  IV site no erythema tenderness or discharge  MSK:  joints without swelling  Neuro: AAO X 3, non- focal    Labs:                        12.0   6.23  )-----------( 313      ( 12 Jan 2023 08:59 )             36.3     01-12    140  |  99  |  11  ----------------------------<  117<H>  3.4<L>   |  31  |  0.65    Ca    8.6      12 Jan 2023 08:59  Phos  3.6     01-12  Mg     2.1     01-12    TPro  6.8  /  Alb  3.0<L>  /  TBili  0.6  /  DBili  x   /  AST  20  /  ALT  28  /  AlkPhos  134<H>  01-12    WBC Trend:  WBC Count: 6.23 (01-12-23 @ 08:59)  WBC Count: 8.93 (01-11-23 @ 19:57)    Auto Neutrophil #: 6.77 K/uL (01-11-23 @ 19:57)    Creatine Trend:  Creatinine, Serum: 0.65 (01-12)  Creatinine, Serum: 0.77 (01-11)    Liver Biochemical Testing Trend:  Alanine Aminotransferase (ALT/SGPT): 28 (01-12)  Alanine Aminotransferase (ALT/SGPT): 32 (01-11)  Aspartate Aminotransferase (AST/SGOT): 20 (01-12-23 @ 08:59)  Aspartate Aminotransferase (AST/SGOT): 28 (01-11-23 @ 19:57)  Bilirubin Total, Serum: 0.6 (01-12)  Bilirubin Total, Serum: 0.7 (01-11)    Trend LDH    Auto Eosinophil %: 0.0 % (01-11-23 @ 19:57)    Urinalysis Basic - ( 12 Jan 2023 00:42 )    Color: Yellow / Appearance: Clear / SG: >1.050 / pH: x  Gluc: x / Ketone: Negative  / Bili: Negative / Urobili: Negative   Blood: x / Protein: 100 mg/dL / Nitrite: Negative   Leuk Esterase: Negative / RBC: 409 /hpf / WBC 14 /HPF   Sq Epi: x / Non Sq Epi: 3 /hpf / Bacteria: Negative    MICROBIOLOGY:    C-Reactive Protein, Serum: 161 (01-12)    RADIOLOGY:  imaging below personally reviewed    < from: CT Orbit w/ IV Cont (01.11.23 @ 20:18) >  IMPRESSION: Right periorbital cellulitis with post septal extension as   described above. There is associated mild right nasopharyngeal. No   discrete fluid collection.    There is acutemaxillary sinusitis.      < end of copied text >  < from: CT Orbit w/ IV Cont (01.11.23 @ 20:18) >  IMPRESSION: Right periorbital cellulitis with post septal extension as   described above. There is associated mild right nasopharyngeal. No   discrete fluid collection.    There is acutemaxillary sinusitis.    < end of copied text >             HPI:    47 year old Female with PMHx of Down Syndrome, Duodenitis/Gastritis, Gilbert's syndrome, Hypothyroidism, Ovarian cyst, ASD s/p repair, Anxiety, Celiac disease, sent from Group home on 1/12 with right eye pain.   2 weeks had right eye redness, that was unchanged but starting 2 days ago she developed eyelid swelling, eye discharge, and pain with eye movements, was prescribed azithromycin by outpatient provider, pt then went to ophthalmologist who referred pt to the ED for concern of orbital cellulitis.  CT orbit was notable for right orbital cellulitis w/ post-septal extension, ENT and ophthalmology were consulted in the ED, pt was given vancomcyin/Unasyn, eye drops, admitted to general medicine for further management.  ID consulted for concern of orbital cellulitis.     REVIEW OF SYSTEMS  [  ] ROS unobtainable because:    [X] All other systems negative except as noted below    Constitutional:  [ ] fever [ ] chills  [ ] weight loss  [ ]night sweat  [ ]poor appetite/PO intake [ ]fatigue   Skin:  [ ] rash [ ] phlebitis	  Eyes: [ ] icterus [ ] pain  [ ] discharge [x] R eye pain 	  ENMT: [ ] sore throat  [ ] thrush [ ] ulcers [ ] exudates [ ]anosmia  Respiratory: [ ] dyspnea [ ] hemoptysis [ ] cough [ ] sputum	  Cardiovascular:  [ ] chest pain [ ] palpitations [ ] edema	  Gastrointestinal:  [ ] nausea [ ] vomiting [ ] diarrhea [ ] constipation [ ] pain	  Genitourinary:  [ ] dysuria [ ] frequency [ ] hematuria [ ] discharge [ ] flank pain  [ ] incontinence  Musculoskeletal:  [ ] myalgias [ ] arthralgias [ ] arthritis  [ ] back pain  Neurological:  [ ] headache [ ] weakness [ ] seizures  [ ] confusion/altered mental status    prior hospital charts reviewed [V]  primary team notes reviewed [V]  other consultant notes reviewed [V]    PAST MEDICAL & SURGICAL HISTORY:  Down syndrome    Celiac disease    Hypothyroid    ASD (atrial septal defect)    MRSA (methicillin resistant Staphylococcus aureus)  MSSA/MRSA detected in Nose culture    Lactose intolerance    Macrocytic anemia    Acquired subluxation of left patella, sequela    Gilbert&#x27;s syndrome    Iron deficiency anemia, unspecified iron deficiency anemia type    Cyst of ovary, unspecified laterality    Sleep disturbance    Obsessive compulsive disorder    History of pseudoseizure    Duodenitis    Syncope, unspecified syncope type    Gastritis    Afferent loop syndrome    Anxiety    Acute depression    Gluten free diet    S/P tonsillectomy    ASD (atrial septal defect)  ASD repair Oct 2001    H/O hernia repair  2002, 2009 with mesh    History of colon surgery  duodeno [id number]    History of colon surgery  revision, gastro jejunostomy and lysis of adhesions    SOCIAL HISTORY:  - Denied smoking/vaping/alcohol/recreational drug use    FAMILY HISTORY:  Family history of Alzheimer&#x27;s disease (Mother)    No family history of COPD (Mother)    Allergies  Celiac (Unknown)  No Known Drug Allergies    ANTIMICROBIALS:  ampicillin/sulbactam  IVPB    ampicillin/sulbactam  IVPB 3 every 6 hours  vancomycin  IVPB 1000 every 12 hours    ANTIMICROBIALS (past 90 days):  MEDICATIONS  (STANDING):    ampicillin/sulbactam  IVPB   200 mL/Hr IV Intermittent (01-11-23 @ 23:01)    ampicillin/sulbactam  IVPB   200 mL/Hr IV Intermittent (01-12-23 @ 12:15)   200 mL/Hr IV Intermittent (01-12-23 @ 06:54)    vancomycin  IVPB.   250 mL/Hr IV Intermittent (01-12-23 @ 02:17)    OTHER MEDS:   MEDICATIONS  (STANDING):  dexAMETHasone  IVPB 10 every 8 hours  enoxaparin Injectable 40 every 24 hours  levothyroxine 300 daily  midodrine. 10 three times a day PRN  pantoprazole    Tablet 40 two times a day  sucralfate 1 two times a day    VITALS:  Vital Signs Last 24 Hrs  T(F): 97.4 (01-12-23 @ 10:10), Max: 99 (01-11-23 @ 17:36)    Vital Signs Last 24 Hrs  HR: 86 (01-12-23 @ 10:10) (69 - 94)  BP: 104/64 (01-12-23 @ 10:10) (95/64 - 120/63)  RR: 19 (01-12-23 @ 10:10)  SpO2: 94% (01-12-23 @ 10:10) (93% - 100%)  Wt(kg): --    EXAM:  Physical Exam:  Constitutional:  well preserved, comfortable  Head/Eyes: R eye surrounding erythema and swelling, pain with extraocular movement   ENT:  supple; no thrush  LUNGS:  CTA  CVS:  normal S1, S2, no murmur  Abd:  soft, non-tender; non-distended  Ext:  no edema  Vascular:  IV site no erythema tenderness or discharge  MSK:  joints without swelling  Neuro: AAO X 3, non- focal    Labs:                        12.0   6.23  )-----------( 313      ( 12 Jan 2023 08:59 )             36.3     01-12    140  |  99  |  11  ----------------------------<  117<H>  3.4<L>   |  31  |  0.65    Ca    8.6      12 Jan 2023 08:59  Phos  3.6     01-12  Mg     2.1     01-12    TPro  6.8  /  Alb  3.0<L>  /  TBili  0.6  /  DBili  x   /  AST  20  /  ALT  28  /  AlkPhos  134<H>  01-12    WBC Trend:  WBC Count: 6.23 (01-12-23 @ 08:59)  WBC Count: 8.93 (01-11-23 @ 19:57)    Auto Neutrophil #: 6.77 K/uL (01-11-23 @ 19:57)    Creatine Trend:  Creatinine, Serum: 0.65 (01-12)  Creatinine, Serum: 0.77 (01-11)    Liver Biochemical Testing Trend:  Alanine Aminotransferase (ALT/SGPT): 28 (01-12)  Alanine Aminotransferase (ALT/SGPT): 32 (01-11)  Aspartate Aminotransferase (AST/SGOT): 20 (01-12-23 @ 08:59)  Aspartate Aminotransferase (AST/SGOT): 28 (01-11-23 @ 19:57)  Bilirubin Total, Serum: 0.6 (01-12)  Bilirubin Total, Serum: 0.7 (01-11)    Trend LDH    Auto Eosinophil %: 0.0 % (01-11-23 @ 19:57)    Urinalysis Basic - ( 12 Jan 2023 00:42 )    Color: Yellow / Appearance: Clear / SG: >1.050 / pH: x  Gluc: x / Ketone: Negative  / Bili: Negative / Urobili: Negative   Blood: x / Protein: 100 mg/dL / Nitrite: Negative   Leuk Esterase: Negative / RBC: 409 /hpf / WBC 14 /HPF   Sq Epi: x / Non Sq Epi: 3 /hpf / Bacteria: Negative    MICROBIOLOGY:    C-Reactive Protein, Serum: 161 (01-12)    RADIOLOGY:  imaging below personally reviewed    < from: CT Orbit w/ IV Cont (01.11.23 @ 20:18) >  IMPRESSION: Right periorbital cellulitis with post septal extension as   described above. There is associated mild right nasopharyngeal. No   discrete fluid collection.    There is acutemaxillary sinusitis.      < end of copied text >  < from: CT Orbit w/ IV Cont (01.11.23 @ 20:18) >  IMPRESSION: Right periorbital cellulitis with post septal extension as   described above. There is associated mild right nasopharyngeal. No   discrete fluid collection.    There is acutemaxillary sinusitis.    < end of copied text >

## 2023-01-12 NOTE — H&P ADULT - PROBLEM SELECTOR PLAN 5
dvt ppx: lovenox  diet: NPO except medications  ambulate: with assistance  gi ppx: home ppi    fall precautions   aspiration precautions -hold home fludrocortisone while getting dexamethasone, restart fludrocortisone in 1/13  -c/w home midodrine PRN

## 2023-01-12 NOTE — PROGRESS NOTE ADULT - PROBLEM SELECTOR PLAN 1
·  Recommendation: - IV Unasyn   - IV Decadron 10mg q8h x3 doses.   - NS 0.65% nasal spray TID x3 weeks.   - Afrin 1 spray to BID x3 days.   - Flonase 1spray b/l BID.   - F/U Bacterial ,Fungal, AFB Cx from sinuses.   - ID consult.   - IVF Hydration.   -  ENT will follow.   - Call with questions.

## 2023-01-13 DIAGNOSIS — H15.001 UNSPECIFIED SCLERITIS, RIGHT EYE: ICD-10-CM

## 2023-01-13 LAB
ACE SERPL-CCNC: 39 U/L — SIGNIFICANT CHANGE UP (ref 14–82)
ANA TITR SER: NEGATIVE — SIGNIFICANT CHANGE UP
AUTO DIFF PNL BLD: NEGATIVE — SIGNIFICANT CHANGE UP
C-ANCA SER-ACNC: NEGATIVE — SIGNIFICANT CHANGE UP
CRP SERPL-MCNC: 107 MG/L — HIGH (ref 0–4)
MPO AB + PR3 PNL SER: SIGNIFICANT CHANGE UP
MRSA PCR RESULT.: SIGNIFICANT CHANGE UP
P-ANCA SER-ACNC: NEGATIVE — SIGNIFICANT CHANGE UP
S AUREUS DNA NOSE QL NAA+PROBE: SIGNIFICANT CHANGE UP
T PALLIDUM AB TITR SER: NEGATIVE — SIGNIFICANT CHANGE UP
VANCOMYCIN TROUGH SERPL-MCNC: 14.1 UG/ML — SIGNIFICANT CHANGE UP (ref 10–20)

## 2023-01-13 PROCEDURE — 99232 SBSQ HOSP IP/OBS MODERATE 35: CPT

## 2023-01-13 PROCEDURE — 99233 SBSQ HOSP IP/OBS HIGH 50: CPT

## 2023-01-13 PROCEDURE — 99232 SBSQ HOSP IP/OBS MODERATE 35: CPT | Mod: GC

## 2023-01-13 RX ORDER — FLUTICASONE PROPIONATE 50 MCG
1 SPRAY, SUSPENSION NASAL
Refills: 0 | Status: DISCONTINUED | OUTPATIENT
Start: 2023-01-13 | End: 2023-01-16

## 2023-01-13 RX ADMIN — Medication 500 MILLIGRAM(S): at 12:22

## 2023-01-13 RX ADMIN — Medication 1 APPLICATION(S): at 14:29

## 2023-01-13 RX ADMIN — DORZOLAMIDE HYDROCHLORIDE TIMOLOL MALEATE 1 DROP(S): 20; 5 SOLUTION/ DROPS OPHTHALMIC at 08:10

## 2023-01-13 RX ADMIN — Medication 1 APPLICATION(S): at 05:40

## 2023-01-13 RX ADMIN — Medication 1 MILLIGRAM(S): at 12:18

## 2023-01-13 RX ADMIN — Medication 250 MILLIGRAM(S): at 01:59

## 2023-01-13 RX ADMIN — AMPICILLIN SODIUM AND SULBACTAM SODIUM 200 GRAM(S): 250; 125 INJECTION, POWDER, FOR SUSPENSION INTRAMUSCULAR; INTRAVENOUS at 05:37

## 2023-01-13 RX ADMIN — Medication 1 APPLICATION(S): at 21:17

## 2023-01-13 RX ADMIN — Medication 1 GRAM(S): at 17:33

## 2023-01-13 RX ADMIN — PANTOPRAZOLE SODIUM 40 MILLIGRAM(S): 20 TABLET, DELAYED RELEASE ORAL at 08:10

## 2023-01-13 RX ADMIN — Medication 1 GRAM(S): at 05:37

## 2023-01-13 RX ADMIN — OXYMETAZOLINE HYDROCHLORIDE 1 SPRAY(S): 0.5 SPRAY NASAL at 17:34

## 2023-01-13 RX ADMIN — Medication 1 TABLET(S): at 12:19

## 2023-01-13 RX ADMIN — Medication 1 SPRAY(S): at 14:26

## 2023-01-13 RX ADMIN — Medication 1 SPRAY(S): at 21:17

## 2023-01-13 RX ADMIN — DORZOLAMIDE HYDROCHLORIDE TIMOLOL MALEATE 1 DROP(S): 20; 5 SOLUTION/ DROPS OPHTHALMIC at 17:34

## 2023-01-13 RX ADMIN — AMPICILLIN SODIUM AND SULBACTAM SODIUM 200 GRAM(S): 250; 125 INJECTION, POWDER, FOR SUSPENSION INTRAMUSCULAR; INTRAVENOUS at 12:18

## 2023-01-13 RX ADMIN — PREGABALIN 1000 MICROGRAM(S): 225 CAPSULE ORAL at 12:18

## 2023-01-13 RX ADMIN — PANTOPRAZOLE SODIUM 40 MILLIGRAM(S): 20 TABLET, DELAYED RELEASE ORAL at 17:35

## 2023-01-13 RX ADMIN — Medication 100 MILLIGRAM(S): at 05:39

## 2023-01-13 RX ADMIN — Medication 250 MILLIGRAM(S): at 05:39

## 2023-01-13 RX ADMIN — Medication 1 SPRAY(S): at 05:40

## 2023-01-13 RX ADMIN — OXYMETAZOLINE HYDROCHLORIDE 1 SPRAY(S): 0.5 SPRAY NASAL at 05:39

## 2023-01-13 RX ADMIN — ENOXAPARIN SODIUM 40 MILLIGRAM(S): 100 INJECTION SUBCUTANEOUS at 14:26

## 2023-01-13 RX ADMIN — AMPICILLIN SODIUM AND SULBACTAM SODIUM 200 GRAM(S): 250; 125 INJECTION, POWDER, FOR SUSPENSION INTRAMUSCULAR; INTRAVENOUS at 17:31

## 2023-01-13 RX ADMIN — Medication 300 MICROGRAM(S): at 05:37

## 2023-01-13 RX ADMIN — Medication 250 MILLIGRAM(S): at 14:27

## 2023-01-13 RX ADMIN — AMPICILLIN SODIUM AND SULBACTAM SODIUM 200 GRAM(S): 250; 125 INJECTION, POWDER, FOR SUSPENSION INTRAMUSCULAR; INTRAVENOUS at 23:40

## 2023-01-13 RX ADMIN — Medication 250 MILLIGRAM(S): at 17:35

## 2023-01-13 RX ADMIN — Medication 1 SPRAY(S): at 17:33

## 2023-01-13 NOTE — PROGRESS NOTE ADULT - NS ATTEND AMEND GEN_ALL_CORE FT
eyelid swelling dramatically improved  good EOM   + erythema of right eye but no proptosis  c/w current care, doing well

## 2023-01-13 NOTE — PROGRESS NOTE ADULT - ASSESSMENT
47y female w/ pmhx/ochx of Downs syndrome, Duodenitis/Gastritis, Gilbert's syndrome, Hypothyroid, FE anemia, OCS, Ovarian cyst, ASD sp repair, Anxiety, Celiac dz, cataracts OD>OS, blepharitis, LAWRENCE, congenital nystagmus presenting for periorbital swelling OD of 2 days duration not improved on PO abx, found to have infectious vs. inflammatory scleritis vs. IOI. Less likely infectious orbital cellulitis.    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   - EOM and lid edema continuing to improve.   - DFE limited due to pt cooperation - possible trace optic nerve edema and temporal subretinal fluid vs. choroidal. B scan with thickened sclera and shallow choroidals indicative of scleritis.   - At this time, lower suspicion for infectious orbital cellulitis. No fat stranding extraconally near sinuses.  - ddx broad: infectious vs. inflammatory vs. idiopathic etiology  - Pending ACE, lysozyme, cANCA, pANCA, IgG4, KAVON.   - CXR w/o hilar lymphadenopathy.  - Workup thus far: RF elevated, KAVON negative, ESR and CRP elevated, syphilis negative.   - Recommend rheumatology consult  - continue broad spectrum antibiotics per primary team  - C/w solumedrol 500mg QD.   - c/w maxitrol ointment TID right eye, cosopt BID right eye (ordered)   - ENT consult appreciated - sinus cx pending  - Please let Ophthalmology know if any acute changes occur.    Pt discussed with Dr. Hallman, oculoplastics attending.    Outpatient follow-up: Patient should follow-up with his/her ophthalmologist or with [facility] Department of Ophthalmology at the address below     [address] [place] [address]. [address]  [place] [address]  [phone]

## 2023-01-13 NOTE — PROGRESS NOTE ADULT - SUBJECTIVE AND OBJECTIVE BOX
[facility] Division of Hospital Medicine  Melquiades Robbins MD  Available via MS Teams    SUBJECTIVE / OVERNIGHT EVENTS: No acute events overnight. Pt seen and examined at bedside. Pt feels subjectively beter with improved swelling and decrease in rhinorrhea. No blurry vision or eye pain.     MEDICATIONS  (STANDING):  ampicillin/sulbactam  IVPB      ampicillin/sulbactam  IVPB 3 Gram(s) IV Intermittent every 6 hours  ascorbic acid 500 milliGRAM(s) Oral daily  calcium carbonate 1250 mG  + Vitamin D (OsCal 500 + D) 1 Tablet(s) Oral daily  cyanocobalamin 1000 MICROGram(s) Oral daily  dexamethasone/neomycin/polymyxin Ointment 1 Application(s) Right EYE three times a day  dorzolamide 2%/timolol 0.5% Ophthalmic Solution 1 Drop(s) Right EYE every 12 hours  enoxaparin Injectable 40 milliGRAM(s) SubCutaneous every 24 hours  ergocalciferol 83218 Unit(s) Oral every week  fluticasone propionate 50 MICROgram(s)/spray Nasal Spray 1 Spray(s) Both Nostrils two times a day  folic acid 1 milliGRAM(s) Oral daily  levothyroxine 300 MICROGram(s) Oral daily  methylPREDNISolone sodium succinate IVPB 500 milliGRAM(s) IV Intermittent daily  oxymetazoline 0.05% Nasal Spray 1 Spray(s) Both Nostrils two times a day  pantoprazole    Tablet 40 milliGRAM(s) Oral two times a day  saccharomyces boulardii 250 milliGRAM(s) Oral two times a day  sodium chloride 0.65% Nasal 1 Spray(s) Both Nostrils three times a day  sucralfate 1 Gram(s) Oral two times a day  vancomycin  IVPB 1000 milliGRAM(s) IV Intermittent every 12 hours    MEDICATIONS  (PRN):  midodrine. 10 milliGRAM(s) Oral three times a day PRN SBP < 110      I&O's Summary    12 Jan 2023 07:01  -  13 Jan 2023 07:00  --------------------------------------------------------  IN: 120 mL / OUT: 0 mL / NET: 120 mL    PHYSICAL EXAM:  Vital Signs Last 24 Hrs  T(C): 36.6 (13 Jan 2023 13:18), Max: 36.6 (12 Jan 2023 16:06)  T(F): 97.8 (13 Jan 2023 13:18), Max: 97.9 (12 Jan 2023 21:05)  HR: 92 (13 Jan 2023 13:18) (69 - 92)  BP: 104/67 (13 Jan 2023 13:18) (104/67 - 112/75)  RR: 18 (13 Jan 2023 13:18) (16 - 18)  SpO2: 95% (13 Jan 2023 13:18) (95% - 97%)    Parameters below as of 13 Jan 2023 13:18  Patient On (Oxygen Delivery Method): room air      CONSTITUTIONAL: NAD, comfortable  EYES: R sided orbital erythema, PERRL  ENMT: MMM, right sided facial erythema/swelling improved  RESPIRATORY: CTABL; no wheezing  CARDIOVASCULAR: RRR, s1, s2  ABDOMEN: soft, nt,nd     LABS:                        12.0   6.23  )-----------( 313      ( 12 Jan 2023 08:59 )             36.3     01-12    140  |  99  |  11  ----------------------------<  117<H>  3.4<L>   |  31  |  0.65    Ca    8.6      12 Jan 2023 08:59  Phos  3.6     01-12  Mg     2.1     01-12    TPro  6.8  /  Alb  3.0<L>  /  TBili  0.6  /  DBili  x   /  AST  20  /  ALT  28  /  AlkPhos  134<H>  01-12    Urinalysis Basic - ( 12 Jan 2023 00:42 )    Color: Yellow / Appearance: Clear / SG: >1.050 / pH: x  Gluc: x / Ketone: Negative  / Bili: Negative / Urobili: Negative   Blood: x / Protein: 100 mg/dL / Nitrite: Negative   Leuk Esterase: Negative / RBC: 409 /hpf / WBC 14 /HPF   Sq Epi: x / Non Sq Epi: 3 /hpf / Bacteria: Negative    Culture - Other (collected 12 Jan 2023 00:42)  Source: .Other Other  Preliminary Report (12 Jan 2023 21:21):    No growth    Culture - Blood (collected 11 Jan 2023 19:55)  Source: .Blood Blood-Peripheral  Preliminary Report (13 Jan 2023 01:02):    No growth to date.    Culture - Blood (collected 11 Jan 2023 19:40)  Source: .Blood Blood-Peripheral  Preliminary Report (13 Jan 2023 01:02):    No growth to date.      RADIOLOGY & ADDITIONAL TESTS:  New Results Reviewed Today:   New Imaging Personally Reviewed Today:  New Electrocardiogram Personally Reviewed Today:  Prior or Outpatient Records Reviewed Today:    COMMUNICATION:  Care Discussed with Consultants/Other Providers and Details of Discussion: Discussed with ACP  Discussions with Patient/Family: father at bedside  PCP Communication:

## 2023-01-13 NOTE — PROGRESS NOTE ADULT - SUBJECTIVE AND OBJECTIVE BOX
ENT ISSUE/POD: Right Maxillary sinusitis.    HPI: 46 YO F with PMH of Downs syndrome, Duodenitis/Gastritis, Gilbert's syndrome, Hypothyroid, FE anemia, OCS, Ovarian cyst, ASD sp repair, Anxiety, Celiac dz presenting for Right periorbital swelling and redness of 2 days duration. History obtained from father at bedside. Noted swelling started 2 days ago, went to PCP and was prescribed Zpak. Swelling did not improve and pt went to see ophthalmologist Dr. Kayla Clinton today who diagnosed pt with preseptal cellulitis. ENT was consulted for evaluation of Right Maxillary sinus opacification found on CT of Orbits. Pt reports runny nose, nasal congestion for past 2 weeks. Pt denies fever/ chills, cough, sore throat, facial pain/pressure, HA/Dizziness/Blurry vision, fever/chills, recent travel/sick contacts.         PAST MEDICAL & SURGICAL HISTORY:  Down syndrome      Celiac disease      Hypothyroid      ASD (atrial septal defect)      MRSA (methicillin resistant Staphylococcus aureus)  MSSA/MRSA detected in Nose culture      Lactose intolerance      Macrocytic anemia      Acquired subluxation of left patella, sequela      Gilbert&#x27;s syndrome      Iron deficiency anemia, unspecified iron deficiency anemia type      Cyst of ovary, unspecified laterality      Sleep disturbance      Obsessive compulsive disorder      History of pseudoseizure      Duodenitis      Syncope, unspecified syncope type      Gastritis      Afferent loop syndrome      Anxiety      Acute depression      Gluten free diet      S/P tonsillectomy      ASD (atrial septal defect)  ASD repair Oct 2001      H/O hernia repair  2002, 2009 with mesh      History of colon surgery  duodeno [id number]      History of colon surgery  revision, gastro jejunostomy and lysis of adhesions        Allergies    Celiac (Unknown)  No Known Drug Allergies    Intolerances      MEDICATIONS  (STANDING):  ampicillin/sulbactam  IVPB      ampicillin/sulbactam  IVPB 3 Gram(s) IV Intermittent every 6 hours  ascorbic acid 500 milliGRAM(s) Oral daily  calcium carbonate 1250 mG  + Vitamin D (OsCal 500 + D) 1 Tablet(s) Oral daily  cyanocobalamin 1000 MICROGram(s) Oral daily  dexamethasone/neomycin/polymyxin Ointment 1 Application(s) Right EYE three times a day  dorzolamide 2%/timolol 0.5% Ophthalmic Solution 1 Drop(s) Right EYE every 12 hours  enoxaparin Injectable 40 milliGRAM(s) SubCutaneous every 24 hours  ergocalciferol 05833 Unit(s) Oral every week  folic acid 1 milliGRAM(s) Oral daily  levothyroxine 300 MICROGram(s) Oral daily  methylPREDNISolone sodium succinate IVPB 500 milliGRAM(s) IV Intermittent daily  oxymetazoline 0.05% Nasal Spray 1 Spray(s) Both Nostrils two times a day  pantoprazole    Tablet 40 milliGRAM(s) Oral two times a day  saccharomyces boulardii 250 milliGRAM(s) Oral two times a day  sodium chloride 0.65% Nasal 1 Spray(s) Both Nostrils three times a day  sucralfate 1 Gram(s) Oral two times a day  vancomycin  IVPB 1000 milliGRAM(s) IV Intermittent every 12 hours    MEDICATIONS  (PRN):  midodrine. 10 milliGRAM(s) Oral three times a day PRN SBP < 110      Social History: see consult    Family history: see consult    ROS:   ENT: all negative except as noted in HPI   Pulm: denies SOB, cough, hemoptysis  Neuro: denies numbness/tingling, loss of sensation  Endo: denies heat/cold intolerance, excessive sweating      Vital Signs Last 24 Hrs  T(C): 36.4 (13 Jan 2023 05:37), Max: 36.6 (12 Jan 2023 16:06)  T(F): 97.5 (13 Jan 2023 05:37), Max: 97.9 (12 Jan 2023 21:05)  HR: 69 (13 Jan 2023 05:37) (69 - 86)  BP: 112/75 (13 Jan 2023 05:37) (104/64 - 112/75)  BP(mean): --  RR: 18 (13 Jan 2023 05:37) (16 - 19)  SpO2: 96% (13 Jan 2023 05:37) (94% - 97%)    Parameters below as of 13 Jan 2023 05:37  Patient On (Oxygen Delivery Method): room air                              12.0   6.23  )-----------( 313      ( 12 Jan 2023 08:59 )             36.3    01-12    140  |  99  |  11  ----------------------------<  117<H>  3.4<L>   |  31  |  0.65    Ca    8.6      12 Jan 2023 08:59  Phos  3.6     01-12  Mg     2.1     01-12    TPro  6.8  /  Alb  3.0<L>  /  TBili  0.6  /  DBili  x   /  AST  20  /  ALT  28  /  AlkPhos  134<H>  01-12         PHYSICAL EXAM:  Gen: NAD, On RA.   Skin: No rashes, bruises, or lesions  Head: Normocephalic, Atraumatic  Face: no edema, erythema, or fluctuance. Parotid glands soft without mass  Eyes: Right periorbital erythema/edema improving, able to minimally open eye spontaneously, improving TTP.   Nose: Nares bilaterally patent, no discharge  Mouth: No Stridor / Drooling / Trismus.  Mucosa moist, tongue/uvula midline, oropharynx clear  Neck: Flat, supple, no lymphadenopathy, trachea midline, no masses  Lymphatic: No lymphadenopathy  Resp: breathing easily, no stridor  CV: no peripheral edema/cyanosis  GI: nondistended   Peripheral vascular: no JVD or edema  Neuro: facial nerve intact, no facial droop

## 2023-01-13 NOTE — PROGRESS NOTE ADULT - PROBLEM SELECTOR PLAN 1
-appreciate ophthalmology recommendations - initial concern for orbital cellulitis but now treating for scleritis.   - f/u rheumatologic panel, syphilis screen  - c/w Vancomycin and Unasyn  - c/w Maxitrol TID and Cosopt BID  - f/u blood cultures  - Start solumedrol 500mg QD  - Alert ophthalmology if any acute changes occur

## 2023-01-13 NOTE — PROGRESS NOTE ADULT - SUBJECTIVE AND OBJECTIVE BOX
[facility] DEPARTMENT OF OPHTHALMOLOGY  ------------------------------------------------------------------------------    Interval History: No acute events overnight. Today patient reports feeling improved from yesterday.     MEDICATIONS  (STANDING):  ampicillin/sulbactam  IVPB      ampicillin/sulbactam  IVPB 3 Gram(s) IV Intermittent every 6 hours  ascorbic acid 500 milliGRAM(s) Oral daily  calcium carbonate 1250 mG  + Vitamin D (OsCal 500 + D) 1 Tablet(s) Oral daily  cyanocobalamin 1000 MICROGram(s) Oral daily  dexamethasone/neomycin/polymyxin Ointment 1 Application(s) Right EYE three times a day  dorzolamide 2%/timolol 0.5% Ophthalmic Solution 1 Drop(s) Right EYE every 12 hours  enoxaparin Injectable 40 milliGRAM(s) SubCutaneous every 24 hours  ergocalciferol 54895 Unit(s) Oral every week  fluticasone propionate 50 MICROgram(s)/spray Nasal Spray 1 Spray(s) Both Nostrils two times a day  folic acid 1 milliGRAM(s) Oral daily  levothyroxine 300 MICROGram(s) Oral daily  methylPREDNISolone sodium succinate IVPB 500 milliGRAM(s) IV Intermittent daily  oxymetazoline 0.05% Nasal Spray 1 Spray(s) Both Nostrils two times a day  pantoprazole    Tablet 40 milliGRAM(s) Oral two times a day  saccharomyces boulardii 250 milliGRAM(s) Oral two times a day  sodium chloride 0.65% Nasal 1 Spray(s) Both Nostrils three times a day  sucralfate 1 Gram(s) Oral two times a day  vancomycin  IVPB 1000 milliGRAM(s) IV Intermittent every 12 hours    MEDICATIONS  (PRN):  midodrine. 10 milliGRAM(s) Oral three times a day PRN SBP < 110      VITALS: T(C): 36.6 (01-13-23 @ 13:18)  T(F): 97.8 (01-13-23 @ 13:18), Max: 97.9 (01-12-23 @ 21:05)  HR: 92 (01-13-23 @ 13:18) (69 - 92)  BP: 104/67 (01-13-23 @ 13:18) (104/67 - 112/75)  RR:  (18 - 18)  SpO2:  (95% - 97%)  Wt(kg): --  General: AAO x 3, appropriate mood and affect    Ophthalmology Exam:  Visual acuity (sc): 20/40 OD  Pupils: PERRL OU, no APD  Ttono: 15 OD  Extraocular movements (EOMs): Ess full OU    Pen Light Exam (PLE)  External: periorbital edema and erythema OD, flat OS  Lids/Lashes/Lacrimal Ducts: 1+ lid edema and erythema OD. Flat OS  Sclera/Conjunctiva: 1+ injection, 1+ chemosis OD. W+Q OS  Cornea: Cl OU  Anterior Chamber: D+F OU    Iris: Flat OU  Lens: NS OU   [facility] DEPARTMENT OF OPHTHALMOLOGY  ------------------------------------------------------------------------------    Interval History: No acute events overnight. Today patient reports feeling improved from yesterday.     MEDICATIONS  (STANDING):  ampicillin/sulbactam  IVPB      ampicillin/sulbactam  IVPB 3 Gram(s) IV Intermittent every 6 hours  ascorbic acid 500 milliGRAM(s) Oral daily  calcium carbonate 1250 mG  + Vitamin D (OsCal 500 + D) 1 Tablet(s) Oral daily  cyanocobalamin 1000 MICROGram(s) Oral daily  dexamethasone/neomycin/polymyxin Ointment 1 Application(s) Right EYE three times a day  dorzolamide 2%/timolol 0.5% Ophthalmic Solution 1 Drop(s) Right EYE every 12 hours  enoxaparin Injectable 40 milliGRAM(s) SubCutaneous every 24 hours  ergocalciferol 91850 Unit(s) Oral every week  fluticasone propionate 50 MICROgram(s)/spray Nasal Spray 1 Spray(s) Both Nostrils two times a day  folic acid 1 milliGRAM(s) Oral daily  levothyroxine 300 MICROGram(s) Oral daily  methylPREDNISolone sodium succinate IVPB 500 milliGRAM(s) IV Intermittent daily  oxymetazoline 0.05% Nasal Spray 1 Spray(s) Both Nostrils two times a day  pantoprazole    Tablet 40 milliGRAM(s) Oral two times a day  saccharomyces boulardii 250 milliGRAM(s) Oral two times a day  sodium chloride 0.65% Nasal 1 Spray(s) Both Nostrils three times a day  sucralfate 1 Gram(s) Oral two times a day  vancomycin  IVPB 1000 milliGRAM(s) IV Intermittent every 12 hours    MEDICATIONS  (PRN):  midodrine. 10 milliGRAM(s) Oral three times a day PRN SBP < 110      VITALS: T(C): 36.6 (01-13-23 @ 13:18)  T(F): 97.8 (01-13-23 @ 13:18), Max: 97.9 (01-12-23 @ 21:05)  HR: 92 (01-13-23 @ 13:18) (69 - 92)  BP: 104/67 (01-13-23 @ 13:18) (104/67 - 112/75)  RR:  (18 - 18)  SpO2:  (95% - 97%)  Wt(kg): --  General: AAO x 3, appropriate mood and affect    Ophthalmology Exam:  Visual acuity (sc): 20/40 OD, 20/50 OS  Pupils: PERRL OU, no APD  Ttono: 15 OD, 16 OS  Extraocular movements (EOMs): Ess full OU    Pen Light Exam (PLE)  External: periorbital edema and erythema OD, flat OS  Lids/Lashes/Lacrimal Ducts: 1+ lid edema and erythema OD. Flat OS  Sclera/Conjunctiva: 1+ injection, 1+ chemosis OD. W+Q OS  Cornea: Cl OU  Anterior Chamber: D+F OU    Iris: Flat OU  Lens: NS OU

## 2023-01-13 NOTE — PROGRESS NOTE ADULT - ATTENDING COMMENTS
47F with right maxillary sinusitis, periorbital cellulitis   -improving   -agree with vancomycin 1 gm iv q12 + Unasyn 3 gm iv q6 - plan 7 days of abx   -blood cx and fluid cx so far negative  -appreciate ENT input  -monitor creatinine, vanco trough  -monitor wbc, temps  -f/u pending labs     Rosendo Fuentes  Attending Physician   Division of Infectious Disease  Office #[phone]  Available on Microsoft Teams also  After 5pm/weekend or no response, call #[phone]    ID coverage available over [facility] holiday weekend (1/14-1/16) if needed. Call #[phone] for questions/concerns.

## 2023-01-13 NOTE — PROGRESS NOTE ADULT - ASSESSMENT
47 year old Female with PMHx of Down Syndrome, Duodenitis/Gastritis, Gilbert's syndrome, Hypothyroidism, Ovarian cyst, ASD s/p repair, Anxiety, Celiac disease, sent from Group home on 1/12 with right eye pain and swelling.    Afebrile  No leukocytosis   CT Orbits: Right periorbital cellulitis with post septal extension. Acute maxillary sinusitis.  UA: WBC 14, large blood and , neg LE, neg Nitrite  CXR: no consolidations  ENT consulted who performed nasal endoscopy Right Anterior nare nasal crusting. ? Purulent discharge, cultures sent   Opthalmology consulted right scleritis vs. idiopathic orbital inflammation, low concern for infectious orbital cellulitis, recommended workup for scleritis   Sinus Cx on 1/11: NGTD  Blood Cx on 1/11: NGTD    #R eye redness, swelling with Evidence of R periOrbital cellulitis with post septal extension on CT orbits likely 2/2 to acute maxillary sinusitis however with low suspicion for infectious orbital cellulitis per opthalmology     Recommendations:  Can continue Unasyn 3 g IV q 6hrs  F/up MRSA PCR   F/up final sinus Culture and Blood Cx sent on 1/11  Steroids as per ENT     PT TO BE SEEN. PRELIM NOTE  PENDING RECS. PLEASE WAIT FOR FINAL RECS AFTER DISCUSSION WITH ATTENDING#    Sidney Cali MD, PGY4  ID fellow  [employer] Teams Preferred  After 5pm/weekends call [phone]               47 year old Female with PMHx of Down Syndrome, Duodenitis/Gastritis, Gilbert's syndrome, Hypothyroidism, Ovarian cyst, ASD s/p repair, Anxiety, Celiac disease, sent from Group home on 1/12 with right eye pain and swelling.    Afebrile  No leukocytosis   CT Orbits: Right periorbital cellulitis with post septal extension. Acute maxillary sinusitis.  UA: WBC 14, large blood and , neg LE, neg Nitrite  CXR: no consolidations  ENT consulted who performed nasal endoscopy Right Anterior nare nasal crusting. ? Purulent discharge, cultures sent   Opthalmology consulted right scleritis vs. idiopathic orbital inflammation, low concern for infectious orbital cellulitis, recommended workup for scleritis   Sinus Cx on 1/11: NGTD  Blood Cx on 1/11: NGTD    #R eye redness, swelling with Evidence of R periorbital cellulitis with post septal extension on CT orbits likely 2/2 to acute maxillary sinusitis however with low suspicion for infectious orbital cellulitis per opthalmology ?scleritis     Recommendations:  Can continue Unasyn 3 g IV q 6hrs  Can discontinue Vancomycin   F/up MRSA PCR   F/up final sinus Culture and Blood Cx sent on 1/11  Steroids as per ENT     Discussed with Dr Alfredo Cali MD, PGY4  ID fellow  [employer] Teams Preferred  After 5pm/weekends call [phone]

## 2023-01-13 NOTE — PROGRESS NOTE ADULT - PROBLEM SELECTOR PLAN 1
- IV ABX per ID    - D/c Decadron   - NS 0.65% nasal spray 1 spray b/l nares TID x3 weeks.   - Afrin 1 spray to BID x3 days.   - Flonase 1 spray b/l nares BID x3 weeks   - F/U Fungal, AFB Cx from sinuses.   - IVF Hydration.   - ENT will follow.   - Call with questions. - IV ABX per ID    - D/c Decadron   - NS 0.65% nasal spray 1 spray b/l nares TID x3 weeks.   - Afrin 1 spray to BID x3 days.   - Flonase 1 spray b/l nares BID x3 weeks   - IVF Hydration.   - ENT will follow.   - Call with questions.

## 2023-01-13 NOTE — PROGRESS NOTE ADULT - ASSESSMENT
48yo F w/ PMHx of Down Syndrome, Duodenitis/Gastritis , Gilbert's syndrome, Hypothyroid, Fe deficiency anemia, Ovarian cyst, ASD s/p repair, Anxiety, Celiac disease, presents with right eye pain, found to have orbital cellulitis vs scleritis and R maxillary sinusitis.

## 2023-01-13 NOTE — PROGRESS NOTE ADULT - ASSESSMENT
46 YO F with PMH of Downs syndrome, Duodenitis/Gastritis , Gilbert's syndrome, Hypothyroid, FE anemia, OCS, Ovarian cyst, ASD sp repair, Anxiety, Celiac dz presenting for Right  periorbital swelling of 2 days duration went to see ophthalmologist on 1/11 who diagnosed pt with preseptal cellulitis. ENT was consulted for evaluation of Right Maxillary sinus opacification found on CT Orbits. Pt reports runny nose, nasal congestion for past 2 weeks. Findings are consistent with maxillary sinusitis. On exam today, right eye erythema and swelling appears to be improving. Pt reports feeling better. WBC: 6.23 Afebrile. Sinus bacterial culture negative.

## 2023-01-14 LAB
ANION GAP SERPL CALC-SCNC: 12 MMOL/L — SIGNIFICANT CHANGE UP (ref 5–17)
BUN SERPL-MCNC: 19 MG/DL — SIGNIFICANT CHANGE UP (ref 7–23)
CALCIUM SERPL-MCNC: 9.1 MG/DL — SIGNIFICANT CHANGE UP (ref 8.4–10.5)
CHLORIDE SERPL-SCNC: 98 MMOL/L — SIGNIFICANT CHANGE UP (ref 96–108)
CO2 SERPL-SCNC: 30 MMOL/L — SIGNIFICANT CHANGE UP (ref 22–31)
CREAT SERPL-MCNC: 0.68 MG/DL — SIGNIFICANT CHANGE UP (ref 0.5–1.3)
EGFR: 108 ML/MIN/1.73M2 — SIGNIFICANT CHANGE UP
GLUCOSE BLDC GLUCOMTR-MCNC: 124 MG/DL — HIGH (ref 70–99)
GLUCOSE SERPL-MCNC: 105 MG/DL — HIGH (ref 70–99)
POTASSIUM SERPL-MCNC: 3.8 MMOL/L — SIGNIFICANT CHANGE UP (ref 3.5–5.3)
POTASSIUM SERPL-SCNC: 3.8 MMOL/L — SIGNIFICANT CHANGE UP (ref 3.5–5.3)
SODIUM SERPL-SCNC: 140 MMOL/L — SIGNIFICANT CHANGE UP (ref 135–145)

## 2023-01-14 PROCEDURE — 99231 SBSQ HOSP IP/OBS SF/LOW 25: CPT

## 2023-01-14 PROCEDURE — 99222 1ST HOSP IP/OBS MODERATE 55: CPT | Mod: GC

## 2023-01-14 PROCEDURE — 99232 SBSQ HOSP IP/OBS MODERATE 35: CPT

## 2023-01-14 RX ADMIN — AMPICILLIN SODIUM AND SULBACTAM SODIUM 200 GRAM(S): 250; 125 INJECTION, POWDER, FOR SUSPENSION INTRAMUSCULAR; INTRAVENOUS at 12:01

## 2023-01-14 RX ADMIN — Medication 1 TABLET(S): at 12:02

## 2023-01-14 RX ADMIN — Medication 1 SPRAY(S): at 17:54

## 2023-01-14 RX ADMIN — AMPICILLIN SODIUM AND SULBACTAM SODIUM 200 GRAM(S): 250; 125 INJECTION, POWDER, FOR SUSPENSION INTRAMUSCULAR; INTRAVENOUS at 17:52

## 2023-01-14 RX ADMIN — Medication 1 MILLIGRAM(S): at 12:02

## 2023-01-14 RX ADMIN — Medication 1 APPLICATION(S): at 21:19

## 2023-01-14 RX ADMIN — PANTOPRAZOLE SODIUM 40 MILLIGRAM(S): 20 TABLET, DELAYED RELEASE ORAL at 17:53

## 2023-01-14 RX ADMIN — DORZOLAMIDE HYDROCHLORIDE TIMOLOL MALEATE 1 DROP(S): 20; 5 SOLUTION/ DROPS OPHTHALMIC at 06:00

## 2023-01-14 RX ADMIN — PREGABALIN 1000 MICROGRAM(S): 225 CAPSULE ORAL at 12:02

## 2023-01-14 RX ADMIN — PANTOPRAZOLE SODIUM 40 MILLIGRAM(S): 20 TABLET, DELAYED RELEASE ORAL at 06:00

## 2023-01-14 RX ADMIN — AMPICILLIN SODIUM AND SULBACTAM SODIUM 200 GRAM(S): 250; 125 INJECTION, POWDER, FOR SUSPENSION INTRAMUSCULAR; INTRAVENOUS at 23:47

## 2023-01-14 RX ADMIN — Medication 300 MICROGRAM(S): at 06:01

## 2023-01-14 RX ADMIN — Medication 1 SPRAY(S): at 06:02

## 2023-01-14 RX ADMIN — Medication 250 MILLIGRAM(S): at 17:56

## 2023-01-14 RX ADMIN — Medication 1 GRAM(S): at 05:59

## 2023-01-14 RX ADMIN — AMPICILLIN SODIUM AND SULBACTAM SODIUM 200 GRAM(S): 250; 125 INJECTION, POWDER, FOR SUSPENSION INTRAMUSCULAR; INTRAVENOUS at 05:59

## 2023-01-14 RX ADMIN — Medication 500 MILLIGRAM(S): at 12:03

## 2023-01-14 RX ADMIN — OXYMETAZOLINE HYDROCHLORIDE 1 SPRAY(S): 0.5 SPRAY NASAL at 06:00

## 2023-01-14 RX ADMIN — Medication 250 MILLIGRAM(S): at 06:00

## 2023-01-14 RX ADMIN — Medication 250 MILLIGRAM(S): at 13:49

## 2023-01-14 RX ADMIN — DORZOLAMIDE HYDROCHLORIDE TIMOLOL MALEATE 1 DROP(S): 20; 5 SOLUTION/ DROPS OPHTHALMIC at 17:56

## 2023-01-14 RX ADMIN — Medication 100 MILLIGRAM(S): at 06:00

## 2023-01-14 RX ADMIN — Medication 1 GRAM(S): at 17:55

## 2023-01-14 RX ADMIN — Medication 1 SPRAY(S): at 06:01

## 2023-01-14 RX ADMIN — ENOXAPARIN SODIUM 40 MILLIGRAM(S): 100 INJECTION SUBCUTANEOUS at 13:50

## 2023-01-14 RX ADMIN — OXYMETAZOLINE HYDROCHLORIDE 1 SPRAY(S): 0.5 SPRAY NASAL at 17:51

## 2023-01-14 RX ADMIN — Medication 1 SPRAY(S): at 21:19

## 2023-01-14 RX ADMIN — Medication 250 MILLIGRAM(S): at 01:09

## 2023-01-14 RX ADMIN — Medication 1 SPRAY(S): at 13:49

## 2023-01-14 RX ADMIN — Medication 1 APPLICATION(S): at 06:00

## 2023-01-14 RX ADMIN — Medication 1 APPLICATION(S): at 13:50

## 2023-01-14 NOTE — CONSULT NOTE ADULT - ATTENDING COMMENTS
47F with right maxillary sinusitis, periorbital cellulitis   -agree with vancomycin 1 gm iv q12 + Unasyn 3 gm iv q6  -check blood cx and fluid cx  -appreciate ENT input  -monitor creatinine, vanco trough  -monitor wbc, temps    Rosendo Fuentes  Attending Physician   Division of Infectious Disease  Office #[phone]  Available on Microsoft Teams also  After 5pm/weekend or no response, call #[phone]
Dr. Yessi Douglas  Rheumatology Attending  [facility]  Rheumatology at [place]     Contact:   call the office:: [phone] or reach [place] Microsoft Teams, weekdays before 5 pm   after 5 pm or on weekends, contact [phone]
Right scleritis with periorbital edema  -Do not agree with radiology read of orbital cellulitis. The orbital inflammation is predominantly centered in the sclera and adjacent to the retrobulbar optic nerve. By contrast, the extraconal fat abutting the area of maxillary sinusitis is quiet.  -Exam also consistent with scleritis with injection, chemosis, pain and exquisite tenderness, and subretinal fluid  -B scan consistent with posterior scleritis with "T sign". Shallow subretinal fluid noted nasally and temporally  -Rec scleritis/ocular inflammation workup including CBC with diff, CRP, ESR, ACE, lysozyme, cANCA/pANCA, RF, KAVON, FTA Abs, Quantiferon, CXR or CT to eval for hilar adenopathy  -Rec IV steroids (already started by ENT)  -Agree with IV antibiotic coverage for maxillary sinusitis. Infectious scleritis is possible but lower on the differential.     Consult service will follow.    Lizzy Hallman MD  Oculoplastic Surgery

## 2023-01-14 NOTE — PROGRESS NOTE ADULT - SUBJECTIVE AND OBJECTIVE BOX
ENT ISSUE/POD: Right Maxillary sinusitis.    HPI: 48 YO F with PMH of Downs syndrome, Duodenitis/Gastritis, Gilbert's syndrome, Hypothyroid, FE anemia, OCS, Ovarian cyst, ASD sp repair, Anxiety, Celiac dz presenting for Right periorbital swelling and redness of 2 days duration. History obtained from father at bedside. Noted swelling started 2 days ago, went to PCP and was prescribed Zpak. Swelling did not improve and pt went to see ophthalmologist Dr. Kayla Clinton today who diagnosed pt with preseptal cellulitis. ENT was consulted for evaluation of Right Maxillary sinus opacification found on CT of Orbits. Pt reports runny nose, nasal congestion for past 2 weeks. Pt denies fever/ chills, cough, sore throat, facial pain/pressure, HA/Dizziness/Blurry vision, fever/chills, recent travel/sick contacts. PT denies any new swelling, feels okay     PAST MEDICAL & SURGICAL HISTORY:  Down syndrome      Celiac disease      Hypothyroid      ASD (atrial septal defect)      MRSA (methicillin resistant Staphylococcus aureus)  MSSA/MRSA detected in Nose culture      Lactose intolerance      Macrocytic anemia      Acquired subluxation of left patella, sequela      Gilbert&#x27;s syndrome      Iron deficiency anemia, unspecified iron deficiency anemia type      Cyst of ovary, unspecified laterality      Sleep disturbance      Obsessive compulsive disorder      History of pseudoseizure      Duodenitis      Syncope, unspecified syncope type      Gastritis      Afferent loop syndrome      Anxiety      Acute depression      Gluten free diet      S/P tonsillectomy      ASD (atrial septal defect)  ASD repair Oct 2001      H/O hernia repair  2002, 2009 with mesh      History of colon surgery  duodeno [id number]      History of colon surgery  revision, gastro jejunostomy and lysis of adhesions        Allergies    Celiac (Unknown)  No Known Drug Allergies    Intolerances      MEDICATIONS  (STANDING):  ampicillin/sulbactam  IVPB      ampicillin/sulbactam  IVPB 3 Gram(s) IV Intermittent every 6 hours  ascorbic acid 500 milliGRAM(s) Oral daily  calcium carbonate 1250 mG  + Vitamin D (OsCal 500 + D) 1 Tablet(s) Oral daily  cyanocobalamin 1000 MICROGram(s) Oral daily  dexamethasone/neomycin/polymyxin Ointment 1 Application(s) Right EYE three times a day  dorzolamide 2%/timolol 0.5% Ophthalmic Solution 1 Drop(s) Right EYE every 12 hours  enoxaparin Injectable 40 milliGRAM(s) SubCutaneous every 24 hours  ergocalciferol 78202 Unit(s) Oral every week  fluticasone propionate 50 MICROgram(s)/spray Nasal Spray 1 Spray(s) Both Nostrils two times a day  folic acid 1 milliGRAM(s) Oral daily  levothyroxine 300 MICROGram(s) Oral daily  methylPREDNISolone sodium succinate IVPB 500 milliGRAM(s) IV Intermittent daily  oxymetazoline 0.05% Nasal Spray 1 Spray(s) Both Nostrils two times a day  pantoprazole    Tablet 40 milliGRAM(s) Oral two times a day  saccharomyces boulardii 250 milliGRAM(s) Oral two times a day  sodium chloride 0.65% Nasal 1 Spray(s) Both Nostrils three times a day  sucralfate 1 Gram(s) Oral two times a day  vancomycin  IVPB 1000 milliGRAM(s) IV Intermittent every 12 hours    MEDICATIONS  (PRN):  midodrine. 10 milliGRAM(s) Oral three times a day PRN SBP < 110    social history: see consult     family history: see consult     ROS:   ENT: all negative except as noted in HPI   Pulm: denies SOB, cough, hemoptysis  Neuro: denies numbness/tingling, loss of sensation  Endo: denies heat/cold intolerance, excessive sweating      Vital Signs Last 24 Hrs  T(C): 36.4 (14 Jan 2023 12:44), Max: 36.5 (14 Jan 2023 05:40)  T(F): 97.5 (14 Jan 2023 12:44), Max: 97.7 (14 Jan 2023 05:40)  HR: 72 (14 Jan 2023 12:44) (61 - 72)  BP: 117/74 (14 Jan 2023 12:44) (109/62 - 124/77)  BP(mean): --  RR: 18 (14 Jan 2023 12:44) (18 - 18)  SpO2: 96% (14 Jan 2023 12:44) (96% - 98%)    Parameters below as of 14 Jan 2023 12:44  Patient On (Oxygen Delivery Method): room air         01-14    140  |  98  |  19  ----------------------------<  105<H>  3.8   |  30  |  0.68    Ca    9.1      14 Jan 2023 07:17         PHYSICAL EXAM:  Gen: NAD  Skin: No rashes, bruises, or lesions  Head: Normocephalic, Atraumatic  Face: no edema, erythema, or fluctuance. Parotid glands soft without mass  Eyes: R sided scleral injection, no sig edema noted   Nose: Nares bilaterally patent, no discharge  Mouth: No Stridor / Drooling / Trismus.  Mucosa moist, tongue/uvula midline, oropharynx clear  Neck: Flat, supple, no lymphadenopathy, trachea midline, no masses  Lymphatic: No lymphadenopathy  Resp: breathing easily, no stridor  Neuro: facial nerve intact, no facial droop

## 2023-01-14 NOTE — PROGRESS NOTE ADULT - SUBJECTIVE AND OBJECTIVE BOX
[facility] DEPARTMENT OF OPHTHALMOLOGY  ------------------------------------------------------------------------------  Aubrey Grier MD PGY-2  ------------------------------------------------------------------------------    Interval History: No acute events overnight. Patient reports pain is improved and her vision is the same     MEDICATIONS  (STANDING):  ampicillin/sulbactam  IVPB      ampicillin/sulbactam  IVPB 3 Gram(s) IV Intermittent every 6 hours  ascorbic acid 500 milliGRAM(s) Oral daily  calcium carbonate 1250 mG  + Vitamin D (OsCal 500 + D) 1 Tablet(s) Oral daily  cyanocobalamin 1000 MICROGram(s) Oral daily  dexamethasone/neomycin/polymyxin Ointment 1 Application(s) Right EYE three times a day  dorzolamide 2%/timolol 0.5% Ophthalmic Solution 1 Drop(s) Right EYE every 12 hours  enoxaparin Injectable 40 milliGRAM(s) SubCutaneous every 24 hours  ergocalciferol 46948 Unit(s) Oral every week  fluticasone propionate 50 MICROgram(s)/spray Nasal Spray 1 Spray(s) Both Nostrils two times a day  folic acid 1 milliGRAM(s) Oral daily  levothyroxine 300 MICROGram(s) Oral daily  methylPREDNISolone sodium succinate IVPB 500 milliGRAM(s) IV Intermittent daily  oxymetazoline 0.05% Nasal Spray 1 Spray(s) Both Nostrils two times a day  pantoprazole    Tablet 40 milliGRAM(s) Oral two times a day  saccharomyces boulardii 250 milliGRAM(s) Oral two times a day  sodium chloride 0.65% Nasal 1 Spray(s) Both Nostrils three times a day  sucralfate 1 Gram(s) Oral two times a day  vancomycin  IVPB 1000 milliGRAM(s) IV Intermittent every 12 hours    MEDICATIONS  (PRN):  midodrine. 10 milliGRAM(s) Oral three times a day PRN SBP < 110      VITALS: T(C): 36.4 (01-14-23 @ 12:44)  T(F): 97.5 (01-14-23 @ 12:44), Max: 97.7 (01-14-23 @ 05:40)  HR: 72 (01-14-23 @ 12:44) (61 - 72)  BP: 117/74 (01-14-23 @ 12:44) (109/62 - 124/77)  RR:  (18 - 18)  SpO2:  (96% - 98%)  Wt(kg): --  General: AAO x 3, appropriate mood and affect    Ophthalmology Exam:  Visual acuity (sc): 20/50 OD; 20/50 OS  Pupils: PERRL OU, no APD  Ttono: 11OD 12OS  Extraocular movements (EOMs): Ess full OU; limitation in infraduction OU likely 2/2 to pain vs cooperation/ Improved from prior  Confrontational Visual Field (CVF): Full OU    Pen Light Exam (PLE)  External: periorbital edema and erythema OD, flat OS  Lids/Lashes/Lacrimal Ducts: 1+ lid edema and erythema OD. Flat OS  Sclera/Conjunctiva: 1+ injection, 1+ chemosis OD. W+Q OS  Cornea: Cl OU  Anterior Chamber: D+F OU    Iris: Flat OU  Lens: NS OU

## 2023-01-14 NOTE — CONSULT NOTE ADULT - ASSESSMENT
46 yo F PMH Down Syndrome, Duodenitis/Gastritis, Gilbert's syndrome, Hypothyroidism, Ovarian cyst, ASD s/p repair, Anxiety, Celiac disease, sent from Group home on 1/12 with right eye pain found to have maxillary sinusitis (on CT, w/ runny nose/congestion) and R eye possible optic nerve edema/scleritis. Rheumatology consulted for secondary rheumatologic causes of scleritis    R eye possible optic nerve edema/scleritis  -low suspicion for infection per optho  -s/p decadron and solumedrol optho/ENT, pt w/ improvement in scleritis symptoms  -on abx for sinusitis  -rheumatologic causes of scleritis include RA, SLE, MCTD, reactive arthritis, Ankylosing spondylitis, GPA, gout, syphilis  -pt does not have any stigmata of above autoimmune diseases    Serology:   -RF 21, / (could be elevated 2/2 sinusitis)  -negative KAVON, syphillis    Plan:  -will ordered CCP, ANCA/MPO/PR3, HLA B27, DS-DNA, PATRICIA, C3, C4, sjogrens, vit D 1,25, vit D 2,5, uric acid  -repeat ESR/CRP once infection resolved  -steroids per ENT/optho    Discussed with Attending Dr. Misael Lemus,   Rheumatology Fellow  Pager: [phone]  Available on TEAMS

## 2023-01-14 NOTE — PROGRESS NOTE ADULT - SUBJECTIVE AND OBJECTIVE BOX
PROGRESS NOTE:   Authored by Dr. Mirlande Lauren MD  Pager [phone]     Patient is a 47y old  Female who presents with a chief complaint of orbital cellulitis (14 Jan 2023 11:42)      SUBJECTIVE / OVERNIGHT EVENTS: Patient seen and examined at bedside. Patient and father report that eye has improved, still bothers her a little but feels overall better, less redness and discharge    ADDITIONAL REVIEW OF SYSTEMS: as above    MEDICATIONS  (STANDING):  ampicillin/sulbactam  IVPB      ampicillin/sulbactam  IVPB 3 Gram(s) IV Intermittent every 6 hours  ascorbic acid 500 milliGRAM(s) Oral daily  calcium carbonate 1250 mG  + Vitamin D (OsCal 500 + D) 1 Tablet(s) Oral daily  cyanocobalamin 1000 MICROGram(s) Oral daily  dexamethasone/neomycin/polymyxin Ointment 1 Application(s) Right EYE three times a day  dorzolamide 2%/timolol 0.5% Ophthalmic Solution 1 Drop(s) Right EYE every 12 hours  enoxaparin Injectable 40 milliGRAM(s) SubCutaneous every 24 hours  ergocalciferol 88017 Unit(s) Oral every week  fluticasone propionate 50 MICROgram(s)/spray Nasal Spray 1 Spray(s) Both Nostrils two times a day  folic acid 1 milliGRAM(s) Oral daily  levothyroxine 300 MICROGram(s) Oral daily  methylPREDNISolone sodium succinate IVPB 500 milliGRAM(s) IV Intermittent daily  oxymetazoline 0.05% Nasal Spray 1 Spray(s) Both Nostrils two times a day  pantoprazole    Tablet 40 milliGRAM(s) Oral two times a day  saccharomyces boulardii 250 milliGRAM(s) Oral two times a day  sodium chloride 0.65% Nasal 1 Spray(s) Both Nostrils three times a day  sucralfate 1 Gram(s) Oral two times a day  vancomycin  IVPB 1000 milliGRAM(s) IV Intermittent every 12 hours    MEDICATIONS  (PRN):  midodrine. 10 milliGRAM(s) Oral three times a day PRN SBP < 110      CAPILLARY BLOOD GLUCOSE      POCT Blood Glucose.: 124 mg/dL (14 Jan 2023 08:38)    I&O's Summary    13 Jan 2023 07:01  -  14 Jan 2023 07:00  --------------------------------------------------------  IN: 1930 mL / OUT: 0 mL / NET: 1930 mL    14 Jan 2023 07:01  -  14 Jan 2023 11:51  --------------------------------------------------------  IN: 320 mL / OUT: 0 mL / NET: 320 mL        PHYSICAL EXAM:  Vital Signs Last 24 Hrs  T(C): 36.5 (14 Jan 2023 05:40), Max: 36.6 (13 Jan 2023 13:18)  T(F): 97.7 (14 Jan 2023 05:40), Max: 97.8 (13 Jan 2023 13:18)  HR: 63 (14 Jan 2023 05:40) (61 - 92)  BP: 109/62 (14 Jan 2023 05:40) (104/67 - 124/77)  BP(mean): --  RR: 18 (14 Jan 2023 05:40) (18 - 18)  SpO2: 98% (14 Jan 2023 05:40) (95% - 98%)    Parameters below as of 14 Jan 2023 05:40  Patient On (Oxygen Delivery Method): room air        CONSTITUTIONAL: NAD, well-developed  Eyes: erythema, some discharge, EOM intact, pupil reactive  RESPIRATORY: Normal respiratory effort; lungs are clear to auscultation bilaterally  CARDIOVASCULAR: Regular rate and rhythm, normal S1 and S2, no murmur/rub/gallop; No lower extremity edema; Peripheral pulses are 2+ bilaterally  ABDOMEN: Nontender to palpation, normoactive bowel sounds, no rebound/guarding; No hepatosplenomegaly  MUSCLOSKELETAL: no clubbing or cyanosis of digits; no joint swelling or tenderness to palpation  PSYCH: A+O to person, place    LABS:    01-14    140  |  98  |  19  ----------------------------<  105<H>  3.8   |  30  |  0.68    Ca    9.1      14 Jan 2023 07:17                Culture - Other (collected 12 Jan 2023 00:42)  Source: .Other Other  Preliminary Report (12 Jan 2023 21:21):    No growth    Culture - Blood (collected 11 Jan 2023 19:55)  Source: .Blood Blood-Peripheral  Preliminary Report (13 Jan 2023 01:02):    No growth to date.    Culture - Blood (collected 11 Jan 2023 19:40)  Source: .Blood Blood-Peripheral  Preliminary Report (13 Jan 2023 01:02):    No growth to date.        RADIOLOGY & ADDITIONAL TESTS:  Results Reviewed:   Imaging Personally Reviewed:  Electrocardiogram Personally Reviewed:    COORDINATION OF CARE:  Care Discussed with Consultants/Other Providers [Y/N]:  Prior or Outpatient Records Reviewed [Y/N]:

## 2023-01-14 NOTE — PROGRESS NOTE ADULT - PROBLEM SELECTOR PLAN 1
- IV ABX per ID    - NS 0.65% nasal spray 1 spray b/l nares TID x3 weeks.   - Afrin 1 spray to BID x3 days.   - Flonase 1 spray b/l nares BID x3 weeks   - Call with questions.

## 2023-01-14 NOTE — PROGRESS NOTE ADULT - ASSESSMENT
48 YO F with PMH of Downs syndrome, Duodenitis/Gastritis , Gilbert's syndrome, Hypothyroid, FE anemia, OCS, Ovarian cyst, ASD sp repair, Anxiety, Celiac dz presenting for Right  periorbital swelling of 2 days duration went to see ophthalmologist on 1/11 who diagnosed pt with preseptal cellulitis. ENT was consulted for evaluation of Right Maxillary sinus opacification found on CT Orbits. Pt reports runny nose, nasal congestion for past 2 weeks. Findings are consistent with maxillary sinusitis. On exam today, right eye erythema and swelling appears to be improving. Pt reports feeling better. WBC: 6.23 Afebrile. Sinus bacterial culture negative.

## 2023-01-14 NOTE — CONSULT NOTE ADULT - SUBJECTIVE AND OBJECTIVE BOX
incomplete RICARDO FORTUNE  [id number]    HPI: 48 yo F PMH Down Syndrome, Duodenitis/Gastritis, Gilbert's syndrome, Hypothyroidism, Ovarian cyst, ASD s/p repair, Anxiety, Celiac disease, sent from Group home on 1/12 with right eye pain. 2 weeks had right eye redness, that was unchanged but starting 2 days ago she developed eyelid swelling, eye discharge, and pain with eye movements, was prescribed azithromycin by outpatient provider, pt then went to ophthalmologist who referred pt to the ED for concern of orbital cellulitis. CT orbit was notable for right orbital cellulitis w/ post-septal extension, ENT and ophthalmology were consulted in the ED, pt was given vancomcyin/Unasyn, eye drops, admitted to general medicine for further management. Evaluated by optho, suspected to have inflammatory scleritis vs idiopathic orbital inflammation. Rheumatology consulted for evaluation    Subjective: Pt feels well. Says her R eye is improving. Still has some R eye pain. Says she has runny nose w/ discharge. Currently on period. No recent URI or diarrhea symptoms. Has never had problem w/ eye like this before. Says she also has congential inward facing feet.     ROS:  -Denies fever, chills, chest pain, current sob, rash, skin tightening, raynauds, joint pain, alopecia, nasal/oral ulcers, weakness, neuropathy, lower back pain, enthesitis, dactylitis, hand pain or stiffness, hx of sinusitis/nasal polyps/epistaxis. Never tried to have kids.      MEDICATIONS  (STANDING):  ampicillin/sulbactam  IVPB      ampicillin/sulbactam  IVPB 3 Gram(s) IV Intermittent every 6 hours  ascorbic acid 500 milliGRAM(s) Oral daily  calcium carbonate 1250 mG  + Vitamin D (OsCal 500 + D) 1 Tablet(s) Oral daily  cyanocobalamin 1000 MICROGram(s) Oral daily  dexamethasone/neomycin/polymyxin Ointment 1 Application(s) Right EYE three times a day  dorzolamide 2%/timolol 0.5% Ophthalmic Solution 1 Drop(s) Right EYE every 12 hours  enoxaparin Injectable 40 milliGRAM(s) SubCutaneous every 24 hours  ergocalciferol 95607 Unit(s) Oral every week  fluticasone propionate 50 MICROgram(s)/spray Nasal Spray 1 Spray(s) Both Nostrils two times a day  folic acid 1 milliGRAM(s) Oral daily  levothyroxine 300 MICROGram(s) Oral daily  methylPREDNISolone sodium succinate IVPB 500 milliGRAM(s) IV Intermittent daily  pantoprazole    Tablet 40 milliGRAM(s) Oral two times a day  saccharomyces boulardii 250 milliGRAM(s) Oral two times a day  sodium chloride 0.65% Nasal 1 Spray(s) Both Nostrils three times a day  sucralfate 1 Gram(s) Oral two times a day  vancomycin  IVPB 1000 milliGRAM(s) IV Intermittent every 12 hours    MEDICATIONS  (PRN):  midodrine. 10 milliGRAM(s) Oral three times a day PRN SBP < 110      Allergies    Celiac (Unknown)  No Known Drug Allergies    Intolerances       SOCIAL HISTORY: Lives in group home    FAMILY HISTORY:  Family history of Alzheimer&#x27;s disease (Mother)    No family history of COPD (Mother)        Vital Signs Last 24 Hrs  T(C): 36.4 (14 Jan 2023 19:43), Max: 36.5 (14 Jan 2023 05:40)  T(F): 97.6 (14 Jan 2023 19:43), Max: 97.7 (14 Jan 2023 05:40)  HR: 60 (14 Jan 2023 19:43) (60 - 72)  BP: 119/78 (14 Jan 2023 19:43) (109/62 - 119/78)  BP(mean): --  RR: 18 (14 Jan 2023 19:43) (18 - 18)  SpO2: 96% (14 Jan 2023 19:43) (96% - 98%)    Parameters below as of 14 Jan 2023 19:43  Patient On (Oxygen Delivery Method): room air        Physical Exam:  General: Breathing comfortably on room air, no distress  HEENT: EOMI, MMM, no oral ulcers. +R eye scleritis  CVS: +S1/S2, RRR  Resp: CTA b/l. No crackles/wheezing  GI: Soft, NT/ND +BS  MSK: no synovitis. No lower back pain/enthesitis/dactolytis  Skin: no visible rashes    LABS:    01-14    140  |  98  |  19  ----------------------------<  105<H>  3.8   |  30  |  0.68    Ca    9.1      14 Jan 2023 07:17      mr< from: CT Orbit w/ IV Cont (01.11.23 @ 20:18) >    ACC: [id number] EXAM:  CT ORBITS IC                          PROCEDURE DATE:  01/11/2023          INTERPRETATION:  INDICATIONS:  Right periorbital cellulitis.    TECHNIQUE:  Thin section axial images were obtained after the   administration of intravenous contrast using multislice helical   technique.  Reformatted coronal and sagittal images were obtained.    90 cc of Omnipaque 350 were administered. 10 cc were discarded.    COMPARISON:  None.    FINDINGS:  There is prominent right periorbital soft tissue swelling and   subcutaneous fat stranding. No discrete fluid collection.  There is no septal extension of fat stranding and soft tissue haziness   predominantly in the intraconal retrobulbar region. No intraorbital   discrete fluid collection. There is associated mild right exophthalmos.    OPTIC NERVES: Normal.  LACRIMAL GLANDS:  Normal.  EXTRAOCULAR MUSCLES:  Normal.  VISUALIZED INTRACRANIAL STRUCTURES:   Normal.  VISUALIZED SINUSES:   The right maxillary sinus is opacified. There is   mucosal thickening and large fluid level in the left maxillary sinus.   There is opacification of several ethmoid air cells.  BONES:  Normal.  MISCELLANEOUS:  There is a periapical lucency involving the left   maxillary second molar with associated cortical dehiscence. No overlying   discrete fluid collection.    IMPRESSION: Right periorbital cellulitis with post septal extension as   described above. There is associated mild right nasopharyngeal. No   discrete fluid collection.    There is acutemaxillary sinusitis.    These findings were discussed with Dr. PERLA PALACIO at 1/11/2023 8:29   PM by Dr. Watkins with read back confirmation.    --- End of Report ---           SHARRI WATKINS MD; Resident Radiology  This document has been electronically signed.  BABAK ALONZO MD; Attending Radiologist  This document has been electronically signed. Jan 11 2023  8:50PM    < end of copied text >

## 2023-01-14 NOTE — PROGRESS NOTE ADULT - ASSESSMENT
47y female w/ pmhx/ochx of Downs syndrome, Duodenitis/Gastritis, Gilbert's syndrome, Hypothyroid, FE anemia, OCS, Ovarian cyst, ASD sp repair, Anxiety, Celiac dz, cataracts OD>OS, blepharitis, [place], congenital nystagmus presenting for periorbital swelling OD of 2 days duration not improved on PO abx, found to have infectious vs. inflammatory scleritis vs. IOI. Less likely infectious orbital cellulitis.    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   - EOM and lid edema continuing to improve.   - DFE limited due to pt cooperation - possible trace optic nerve edema and temporal subretinal fluid vs. choroidal. B scan with thickened sclera and shallow choroidals indicative of scleritis.   - At this time, lower suspicion for infectious orbital cellulitis. No fat stranding extraconally near sinuses.  - ddx broad: infectious vs. inflammatory vs. idiopathic etiology  - Pending ACE, lysozyme, cANCA, pANCA, IgG4, KAVON.   - CXR w/o hilar lymphadenopathy.  - Workup thus far: RF elevated, KAVON negative, ESR and CRP elevated, syphilis negative.   - Appreciate rheumatology consult  - continue broad spectrum antibiotics per primary team  - C/w solumedrol 500mg QD.   - c/w maxitrol ointment TID right eye, cosopt BID right eye (ordered)   - ENT consult appreciated - sinus cx pending  - Please let Ophthalmology know if any acute changes occur.    Pt discussed with Dr. Hallman, oculoplastics attending.    Outpatient follow-up: Patient should follow-up with his/her ophthalmologist or with Health system Department of Ophthalmology at the address below     [address] [place] [address]. [address]  [place] [address]  [phone]

## 2023-01-14 NOTE — PROGRESS NOTE ADULT - PROBLEM SELECTOR PLAN 1
-appreciate ophthalmology recommendations - initial concern for orbital cellulitis but now suspicious for scleritis.   - c/w Vancomycin and Unasyn for now per ID note (started 1/11 at night), as of 1/14 culture is negative, will d/w ID considering duration of therapy  - Likely scleritis - RF elevated 21 and 23, rest of rheum serology negative, syphilis negative   - c/w Maxitrol TID and Cosopt BID  - blood cultures negative  - c/w solumedrol 500mg QD (started on 1/12)  - d/w ophth and rheum regarding tapering of steroids and possible d/c plan?

## 2023-01-15 LAB
24R-OH-CALCIDIOL SERPL-MCNC: 43.1 NG/ML — SIGNIFICANT CHANGE UP (ref 30–80)
CULTURE RESULTS: SIGNIFICANT CHANGE UP
SPECIMEN SOURCE: SIGNIFICANT CHANGE UP
URATE SERPL-MCNC: 4.1 MG/DL — SIGNIFICANT CHANGE UP (ref 2.5–7)
VANCOMYCIN TROUGH SERPL-MCNC: 12 UG/ML — SIGNIFICANT CHANGE UP (ref 10–20)
VIT D25+D1,25 OH+D1,25 PNL SERPL-MCNC: 62.6 PG/ML — SIGNIFICANT CHANGE UP (ref 19.9–79.3)

## 2023-01-15 PROCEDURE — 99233 SBSQ HOSP IP/OBS HIGH 50: CPT

## 2023-01-15 RX ADMIN — Medication 1 TABLET(S): at 12:31

## 2023-01-15 RX ADMIN — ENOXAPARIN SODIUM 40 MILLIGRAM(S): 100 INJECTION SUBCUTANEOUS at 13:57

## 2023-01-15 RX ADMIN — Medication 1 SPRAY(S): at 23:00

## 2023-01-15 RX ADMIN — Medication 300 MICROGRAM(S): at 06:00

## 2023-01-15 RX ADMIN — Medication 250 MILLIGRAM(S): at 06:00

## 2023-01-15 RX ADMIN — Medication 100 MILLIGRAM(S): at 05:59

## 2023-01-15 RX ADMIN — PANTOPRAZOLE SODIUM 40 MILLIGRAM(S): 20 TABLET, DELAYED RELEASE ORAL at 17:55

## 2023-01-15 RX ADMIN — Medication 1 APPLICATION(S): at 06:00

## 2023-01-15 RX ADMIN — Medication 250 MILLIGRAM(S): at 17:56

## 2023-01-15 RX ADMIN — DORZOLAMIDE HYDROCHLORIDE TIMOLOL MALEATE 1 DROP(S): 20; 5 SOLUTION/ DROPS OPHTHALMIC at 17:55

## 2023-01-15 RX ADMIN — Medication 1 GRAM(S): at 17:56

## 2023-01-15 RX ADMIN — Medication 1 APPLICATION(S): at 13:57

## 2023-01-15 RX ADMIN — Medication 1 SPRAY(S): at 05:59

## 2023-01-15 RX ADMIN — PANTOPRAZOLE SODIUM 40 MILLIGRAM(S): 20 TABLET, DELAYED RELEASE ORAL at 06:00

## 2023-01-15 RX ADMIN — Medication 250 MILLIGRAM(S): at 01:04

## 2023-01-15 RX ADMIN — AMPICILLIN SODIUM AND SULBACTAM SODIUM 200 GRAM(S): 250; 125 INJECTION, POWDER, FOR SUSPENSION INTRAMUSCULAR; INTRAVENOUS at 05:59

## 2023-01-15 RX ADMIN — Medication 1 MILLIGRAM(S): at 12:31

## 2023-01-15 RX ADMIN — Medication 1 GRAM(S): at 06:00

## 2023-01-15 RX ADMIN — Medication 1 APPLICATION(S): at 23:00

## 2023-01-15 RX ADMIN — Medication 1 TABLET(S): at 18:05

## 2023-01-15 RX ADMIN — Medication 500 MILLIGRAM(S): at 12:31

## 2023-01-15 RX ADMIN — Medication 1 SPRAY(S): at 13:57

## 2023-01-15 RX ADMIN — Medication 1 SPRAY(S): at 06:00

## 2023-01-15 RX ADMIN — Medication 1 SPRAY(S): at 17:56

## 2023-01-15 RX ADMIN — Medication 250 MILLIGRAM(S): at 14:00

## 2023-01-15 RX ADMIN — AMPICILLIN SODIUM AND SULBACTAM SODIUM 200 GRAM(S): 250; 125 INJECTION, POWDER, FOR SUSPENSION INTRAMUSCULAR; INTRAVENOUS at 11:38

## 2023-01-15 RX ADMIN — PREGABALIN 1000 MICROGRAM(S): 225 CAPSULE ORAL at 12:32

## 2023-01-15 RX ADMIN — DORZOLAMIDE HYDROCHLORIDE TIMOLOL MALEATE 1 DROP(S): 20; 5 SOLUTION/ DROPS OPHTHALMIC at 05:58

## 2023-01-15 NOTE — PROGRESS NOTE ADULT - SUBJECTIVE AND OBJECTIVE BOX
[facility] DEPARTMENT OF OPHTHALMOLOGY  ------------------------------------------------------------------------------  Aubrey Grier MD PGY-2  ------------------------------------------------------------------------------    Interval History: No acute events overnight. Today patient states vision is stable and eye does not hurt. She says the eye is still itchy though     MEDICATIONS  (STANDING):  amoxicillin  875 milliGRAM(s)/clavulanate 1 Tablet(s) Oral every 12 hours  ascorbic acid 500 milliGRAM(s) Oral daily  calcium carbonate 1250 mG  + Vitamin D (OsCal 500 + D) 1 Tablet(s) Oral daily  cyanocobalamin 1000 MICROGram(s) Oral daily  dexamethasone/neomycin/polymyxin Ointment 1 Application(s) Right EYE three times a day  dorzolamide 2%/timolol 0.5% Ophthalmic Solution 1 Drop(s) Right EYE every 12 hours  enoxaparin Injectable 40 milliGRAM(s) SubCutaneous every 24 hours  ergocalciferol 12013 Unit(s) Oral every week  fluticasone propionate 50 MICROgram(s)/spray Nasal Spray 1 Spray(s) Both Nostrils two times a day  folic acid 1 milliGRAM(s) Oral daily  levothyroxine 300 MICROGram(s) Oral daily  pantoprazole    Tablet 40 milliGRAM(s) Oral two times a day  saccharomyces boulardii 250 milliGRAM(s) Oral two times a day  sodium chloride 0.65% Nasal 1 Spray(s) Both Nostrils three times a day  sucralfate 1 Gram(s) Oral two times a day    MEDICATIONS  (PRN):  midodrine. 10 milliGRAM(s) Oral three times a day PRN SBP < 110      VITALS: T(C): 36.2 (01-15-23 @ 11:27)  T(F): 97.1 (01-15-23 @ 11:27), Max: 97.6 (01-14-23 @ 19:43)  HR: 75 (01-15-23 @ 11:27) (60 - 75)  BP: 137/82 (01-15-23 @ 11:27) (116/73 - 137/82)  RR:  (18 - 18)  SpO2:  (94% - 97%)  Wt(kg): --  General: AAO x 3, appropriate mood and affect    Ophthalmology Exam:  Visual acuity (cc): 20/50 OD; 20/50 OS PHNI OU  Pupils: PERRL OU, no APD  Ttono: 12 OU  Extraocular movements (EOMs): Full OU except in downward gaze, still limited by pain/cooperation. Improving.   Confrontational Visual Field (CVF): Full OU    Pen Light Exam (PLE)  External: periorbital edema and erythema OD, flat OS  Lids/Lashes/Lacrimal Ducts: 1+ lid edema and erythema OD. Flat OS  Sclera/Conjunctiva: 2+ injection, 1+ chemosis OD. W+Q OS  Cornea: Cl OU  Anterior Chamber: D+F OU    Iris: Flat OU  Lens: NS OU

## 2023-01-15 NOTE — PROGRESS NOTE ADULT - PROBLEM SELECTOR PLAN 1
-appreciate ophthalmology recommendations - initial concern for orbital cellulitis but now suspicious for scleritis.   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

## 2023-01-15 NOTE — PROGRESS NOTE ADULT - ASSESSMENT
47y female w/ pmhx/ochx of Downs syndrome, Duodenitis/Gastritis, Gilbert's syndrome, Hypothyroid, FE anemia, OCS, Ovarian cyst, ASD sp repair, Anxiety, Celiac dz, cataracts OD>OS, blepharitis, LAWRENCE, congenital nystagmus presenting for periorbital swelling OD of 2 days duration not improved on PO abx, found to have infectious vs. inflammatory scleritis vs. IOI. Less likely infectious orbital cellulitis.    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   - EOM and lid edema continuing to improve.   - DFE limited due to pt cooperation - possible trace optic nerve edema and temporal subretinal fluid vs. choroidal. B scan with thickened sclera and shallow choroidals indicative of scleritis.   - At this time, lower suspicion for infectious orbital cellulitis. No fat stranding extraconally near sinuses.  - ddx broad: infectious vs. inflammatory vs. idiopathic etiology  - Pending ACE, lysozyme, cANCA, pANCA, IgG4, KAVON.   - CXR w/o hilar lymphadenopathy.  - Workup thus far: RF elevated, KAVON negative, ESR and CRP elevated, syphilis negative.   - Appreciate rheumatology workup  - continue broad spectrum antibiotics per primary team  - can decrease solumedrol 500mg QD to Oral Pred 60mg daily  - Can continue Pred until patient sees uveitis specialist in clinic this week.  - c/w maxitrol ointment TID right eye, cosopt BID right eye (ordered)   - ENT consult appreciated - sinus cx pending  - Please let Ophthalmology know if any acute changes occur.  - Ophtho will evaluate in the am tomorrow prior to discharge.     Pt discussed with Dr. Hallman, oculoplastics attending.    Outpatient follow-up: Patient should follow-up with his/her ophthalmologist or with [facility] Department of Ophthalmology at the address below     [address] [place] [address]. [address]  [place] [address]  [phone]

## 2023-01-15 NOTE — PROGRESS NOTE ADULT - SUBJECTIVE AND OBJECTIVE BOX
PROGRESS NOTE:   Authored by Dr. Mirlande Lauren MD  Pager [phone]     Patient is a 47y old  Female who presents with a chief complaint of orbital cellulitis (14 Jan 2023 16:34)      SUBJECTIVE / OVERNIGHT EVENTS: Patient seen and examined at bedside. Patient c/o itching around her eye, otherwise doing ok     ADDITIONAL REVIEW OF SYSTEMS: as above    MEDICATIONS  (STANDING):  ampicillin/sulbactam  IVPB      ampicillin/sulbactam  IVPB 3 Gram(s) IV Intermittent every 6 hours  ascorbic acid 500 milliGRAM(s) Oral daily  calcium carbonate 1250 mG  + Vitamin D (OsCal 500 + D) 1 Tablet(s) Oral daily  cyanocobalamin 1000 MICROGram(s) Oral daily  dexamethasone/neomycin/polymyxin Ointment 1 Application(s) Right EYE three times a day  dorzolamide 2%/timolol 0.5% Ophthalmic Solution 1 Drop(s) Right EYE every 12 hours  enoxaparin Injectable 40 milliGRAM(s) SubCutaneous every 24 hours  ergocalciferol 87874 Unit(s) Oral every week  fluticasone propionate 50 MICROgram(s)/spray Nasal Spray 1 Spray(s) Both Nostrils two times a day  folic acid 1 milliGRAM(s) Oral daily  levothyroxine 300 MICROGram(s) Oral daily  methylPREDNISolone sodium succinate IVPB 500 milliGRAM(s) IV Intermittent daily  pantoprazole    Tablet 40 milliGRAM(s) Oral two times a day  saccharomyces boulardii 250 milliGRAM(s) Oral two times a day  sodium chloride 0.65% Nasal 1 Spray(s) Both Nostrils three times a day  sucralfate 1 Gram(s) Oral two times a day  vancomycin  IVPB 1000 milliGRAM(s) IV Intermittent every 12 hours    MEDICATIONS  (PRN):  midodrine. 10 milliGRAM(s) Oral three times a day PRN SBP < 110      CAPILLARY BLOOD GLUCOSE        I&O's Summary    14 Jan 2023 07:01  -  15 Delfino 2023 07:00  --------------------------------------------------------  IN: 1910 mL / OUT: 0 mL / NET: 1910 mL        PHYSICAL EXAM:  Vital Signs Last 24 Hrs  T(C): 36.2 (15 Delfino 2023 11:27), Max: 36.4 (14 Jan 2023 19:43)  T(F): 97.1 (15 Delfino 2023 11:27), Max: 97.6 (14 Jan 2023 19:43)  HR: 75 (15 Delfino 2023 11:27) (60 - 75)  BP: 137/82 (15 Delfino 2023 11:27) (116/73 - 137/82)  BP(mean): --  RR: 18 (15 Delfino 2023 11:27) (18 - 18)  SpO2: 94% (15 Delfino 2023 11:27) (94% - 97%)    Parameters below as of 15 Delfino 2023 11:27  Patient On (Oxygen Delivery Method): room air        CONSTITUTIONAL: NAD, well-developed  Eyes: erythema, some discharge, EOM intact, pupil reactive  RESPIRATORY: Normal respiratory effort; lungs are clear to auscultation bilaterally  CARDIOVASCULAR: Regular rate and rhythm, normal S1 and S2, no murmur/rub/gallop; No lower extremity edema; Peripheral pulses are 2+ bilaterally  ABDOMEN: Nontender to palpation, normoactive bowel sounds, no rebound/guarding; No hepatosplenomegaly  MUSCLOSKELETAL: no clubbing or cyanosis of digits; no joint swelling or tenderness to palpation  PSYCH: A+O to person, place    LABS:    01-14    140  |  98  |  19  ----------------------------<  105<H>  3.8   |  30  |  0.68    Ca    9.1      14 Jan 2023 07:17                  RADIOLOGY & ADDITIONAL TESTS:  Results Reviewed:   Imaging Personally Reviewed:  Electrocardiogram Personally Reviewed:    COORDINATION OF CARE:  Care Discussed with Consultants/Other Providers [Y/N]:  Prior or Outpatient Records Reviewed [Y/N]:

## 2023-01-16 ENCOUNTER — TRANSCRIPTION ENCOUNTER (OUTPATIENT)
Age: 48
End: 2023-01-16

## 2023-01-16 VITALS
RESPIRATION RATE: 18 BRPM | DIASTOLIC BLOOD PRESSURE: 77 MMHG | OXYGEN SATURATION: 93 % | SYSTOLIC BLOOD PRESSURE: 116 MMHG | HEART RATE: 60 BPM | TEMPERATURE: 97 F

## 2023-01-16 LAB
ACE SERPL-CCNC: 37 U/L — SIGNIFICANT CHANGE UP (ref 14–82)
C3 SERPL-MCNC: 143 MG/DL — SIGNIFICANT CHANGE UP (ref 81–157)
C4 SERPL-MCNC: 30 MG/DL — SIGNIFICANT CHANGE UP (ref 13–39)
GAMMA INTERFERON BACKGROUND BLD IA-ACNC: 0.08 IU/ML — SIGNIFICANT CHANGE UP
IGG SERPL-MCNC: 795 MG/DL — SIGNIFICANT CHANGE UP (ref 586–1602)
IGG1 SER-MCNC: 410 MG/DL — SIGNIFICANT CHANGE UP (ref 248–810)
IGG2 SER-MCNC: 148 MG/DL — SIGNIFICANT CHANGE UP (ref 130–555)
IGG3 SER-MCNC: 119 MG/DL — HIGH (ref 15–102)
IGG4 SER-MCNC: 3 MG/DL — SIGNIFICANT CHANGE UP (ref 2–96)
LYSOZYME SERPL-MCNC: 6.1 UG/ML — SIGNIFICANT CHANGE UP (ref 2.5–12.9)
LYSOZYME SERPL-MCNC: 9.3 UG/ML — SIGNIFICANT CHANGE UP (ref 2.5–12.9)
M TB IFN-G BLD-IMP: ABNORMAL
M TB IFN-G CD4+ BCKGRND COR BLD-ACNC: -0.04 IU/ML — SIGNIFICANT CHANGE UP
M TB IFN-G CD4+CD8+ BCKGRND COR BLD-ACNC: -0.05 IU/ML — SIGNIFICANT CHANGE UP
QUANT TB PLUS MITOGEN MINUS NIL: 0.03 IU/ML — SIGNIFICANT CHANGE UP

## 2023-01-16 PROCEDURE — 83516 IMMUNOASSAY NONANTIBODY: CPT

## 2023-01-16 PROCEDURE — 84100 ASSAY OF PHOSPHORUS: CPT

## 2023-01-16 PROCEDURE — 80048 BASIC METABOLIC PNL TOTAL CA: CPT

## 2023-01-16 PROCEDURE — 86036 ANCA SCREEN EACH ANTIBODY: CPT

## 2023-01-16 PROCEDURE — 99239 HOSP IP/OBS DSCHRG MGMT >30: CPT

## 2023-01-16 PROCEDURE — 86780 TREPONEMA PALLIDUM: CPT

## 2023-01-16 PROCEDURE — 86200 CCP ANTIBODY: CPT

## 2023-01-16 PROCEDURE — 96374 THER/PROPH/DIAG INJ IV PUSH: CPT

## 2023-01-16 PROCEDURE — 82306 VITAMIN D 25 HYDROXY: CPT

## 2023-01-16 PROCEDURE — 99285 EMERGENCY DEPT VISIT HI MDM: CPT

## 2023-01-16 PROCEDURE — 87641 MR-STAPH DNA AMP PROBE: CPT

## 2023-01-16 PROCEDURE — 87040 BLOOD CULTURE FOR BACTERIA: CPT

## 2023-01-16 PROCEDURE — 85025 COMPLETE CBC W/AUTO DIFF WBC: CPT

## 2023-01-16 PROCEDURE — 85652 RBC SED RATE AUTOMATED: CPT

## 2023-01-16 PROCEDURE — 86431 RHEUMATOID FACTOR QUANT: CPT

## 2023-01-16 PROCEDURE — 84550 ASSAY OF BLOOD/URIC ACID: CPT

## 2023-01-16 PROCEDURE — 86480 TB TEST CELL IMMUN MEASURE: CPT

## 2023-01-16 PROCEDURE — 80053 COMPREHEN METABOLIC PANEL: CPT

## 2023-01-16 PROCEDURE — 86235 NUCLEAR ANTIGEN ANTIBODY: CPT

## 2023-01-16 PROCEDURE — 86255 FLUORESCENT ANTIBODY SCREEN: CPT

## 2023-01-16 PROCEDURE — 83735 ASSAY OF MAGNESIUM: CPT

## 2023-01-16 PROCEDURE — 85549 MURAMIDASE: CPT

## 2023-01-16 PROCEDURE — 87637 SARSCOV2&INF A&B&RSV AMP PRB: CPT

## 2023-01-16 PROCEDURE — 82652 VIT D 1 25-DIHYDROXY: CPT

## 2023-01-16 PROCEDURE — 71045 X-RAY EXAM CHEST 1 VIEW: CPT

## 2023-01-16 PROCEDURE — 81001 URINALYSIS AUTO W/SCOPE: CPT

## 2023-01-16 PROCEDURE — 85027 COMPLETE CBC AUTOMATED: CPT

## 2023-01-16 PROCEDURE — 87640 STAPH A DNA AMP PROBE: CPT

## 2023-01-16 PROCEDURE — 86140 C-REACTIVE PROTEIN: CPT

## 2023-01-16 PROCEDURE — 87070 CULTURE OTHR SPECIMN AEROBIC: CPT

## 2023-01-16 PROCEDURE — 82962 GLUCOSE BLOOD TEST: CPT

## 2023-01-16 PROCEDURE — 82787 IGG 1 2 3 OR 4 EACH: CPT

## 2023-01-16 PROCEDURE — 94640 AIRWAY INHALATION TREATMENT: CPT

## 2023-01-16 PROCEDURE — 84443 ASSAY THYROID STIM HORMONE: CPT

## 2023-01-16 PROCEDURE — 36415 COLL VENOUS BLD VENIPUNCTURE: CPT

## 2023-01-16 PROCEDURE — 81374 HLA I TYPING 1 ANTIGEN LR: CPT

## 2023-01-16 PROCEDURE — 70481 CT ORBIT/EAR/FOSSA W/DYE: CPT | Mod: MA

## 2023-01-16 PROCEDURE — 82164 ANGIOTENSIN I ENZYME TEST: CPT

## 2023-01-16 PROCEDURE — 80202 ASSAY OF VANCOMYCIN: CPT

## 2023-01-16 PROCEDURE — 87077 CULTURE AEROBIC IDENTIFY: CPT

## 2023-01-16 PROCEDURE — 84702 CHORIONIC GONADOTROPIN TEST: CPT

## 2023-01-16 PROCEDURE — 86038 ANTINUCLEAR ANTIBODIES: CPT

## 2023-01-16 PROCEDURE — 86160 COMPLEMENT ANTIGEN: CPT

## 2023-01-16 RX ORDER — SUCRALFATE 1 G
1 TABLET ORAL
Qty: 0 | Refills: 0 | DISCHARGE

## 2023-01-16 RX ORDER — DORZOLAMIDE HYDROCHLORIDE TIMOLOL MALEATE 20; 5 MG/ML; MG/ML
1 SOLUTION/ DROPS OPHTHALMIC
Qty: 1 | Refills: 0
Start: 2023-01-16

## 2023-01-16 RX ORDER — SUCRALFATE 1 G
1 TABLET ORAL
Refills: 0 | DISCHARGE
Start: 2023-01-16

## 2023-01-16 RX ORDER — FLUTICASONE PROPIONATE 50 MCG
1 SPRAY, SUSPENSION NASAL
Qty: 1 | Refills: 0
Start: 2023-01-16

## 2023-01-16 RX ORDER — SODIUM CHLORIDE 0.65 %
1 AEROSOL, SPRAY (ML) NASAL
Qty: 1 | Refills: 0
Start: 2023-01-16 | End: 2023-02-05

## 2023-01-16 RX ORDER — NEOMYCIN/POLYMYXIN B/DEXAMETHA 0.1 %
1 SUSPENSION, DROPS(FINAL DOSAGE FORM)(ML) OPHTHALMIC (EYE)
Qty: 1 | Refills: 0
Start: 2023-01-16

## 2023-01-16 RX ORDER — FLUDROCORTISONE ACETATE 0.1 MG/1
1 TABLET ORAL
Qty: 0 | Refills: 0 | DISCHARGE

## 2023-01-16 RX ORDER — SACCHAROMYCES BOULARDII 250 MG
1 POWDER IN PACKET (EA) ORAL
Qty: 0 | Refills: 0 | DISCHARGE
Start: 2023-01-16

## 2023-01-16 RX ORDER — DORZOLAMIDE HYDROCHLORIDE TIMOLOL MALEATE 20; 5 MG/ML; MG/ML
1 SOLUTION/ DROPS OPHTHALMIC
Qty: 1 | Refills: 0 | DISCHARGE
Start: 2023-01-16

## 2023-01-16 RX ORDER — CHOLECALCIFEROL (VITAMIN D3) 125 MCG
6 CAPSULE ORAL
Qty: 0 | Refills: 0 | DISCHARGE

## 2023-01-16 RX ADMIN — Medication 1 MILLIGRAM(S): at 11:04

## 2023-01-16 RX ADMIN — Medication 1 APPLICATION(S): at 11:15

## 2023-01-16 RX ADMIN — Medication 1 SPRAY(S): at 05:15

## 2023-01-16 RX ADMIN — Medication 1 TABLET(S): at 05:51

## 2023-01-16 RX ADMIN — Medication 60 MILLIGRAM(S): at 05:50

## 2023-01-16 RX ADMIN — PREGABALIN 1000 MICROGRAM(S): 225 CAPSULE ORAL at 11:04

## 2023-01-16 RX ADMIN — DORZOLAMIDE HYDROCHLORIDE TIMOLOL MALEATE 1 DROP(S): 20; 5 SOLUTION/ DROPS OPHTHALMIC at 05:10

## 2023-01-16 RX ADMIN — PANTOPRAZOLE SODIUM 40 MILLIGRAM(S): 20 TABLET, DELAYED RELEASE ORAL at 05:50

## 2023-01-16 RX ADMIN — Medication 500 MILLIGRAM(S): at 11:05

## 2023-01-16 RX ADMIN — Medication 250 MILLIGRAM(S): at 06:46

## 2023-01-16 RX ADMIN — Medication 1 TABLET(S): at 11:04

## 2023-01-16 RX ADMIN — Medication 1 SPRAY(S): at 05:10

## 2023-01-16 RX ADMIN — Medication 1 APPLICATION(S): at 05:51

## 2023-01-16 RX ADMIN — Medication 1 GRAM(S): at 05:51

## 2023-01-16 RX ADMIN — Medication 300 MICROGRAM(S): at 05:52

## 2023-01-16 RX ADMIN — Medication 1 SPRAY(S): at 13:36

## 2023-01-16 NOTE — DISCHARGE NOTE NURSING/CASE MANAGEMENT/SOCIAL WORK - NSDCPEFALRISK_GEN_ALL_CORE
For information on Fall & Injury Prevention, visit: [email or web address] OR  [email or web address] OR  https://www.cdc.gov/steadi/patient.html

## 2023-01-16 NOTE — DISCHARGE NOTE PROVIDER - NSDCFUSCHEDAPPT_GEN_ALL_CORE_FT
Lizzy Hallman  [facility]  OPHTHALM [address]  Scheduled Appointment: 01/30/2023     Lizzy aHllman  [facility]  OPHTHALM [address]  Scheduled Appointment: 01/19/2023    Elsa Carrillo  [facility]  OTOLARYNG [address] [facility] [address]  Scheduled Appointment: 02/01/2023    Yessi Douglas  [facility]  RHEUM [address]  Scheduled Appointment: 02/03/2023

## 2023-01-16 NOTE — PROGRESS NOTE ADULT - SUBJECTIVE AND OBJECTIVE BOX
[facility] DEPARTMENT OF OPHTHALMOLOGY  ------------------------------------------------------------------------------  Kailey Del Valle MD, PGY-2  Contact: TEAMS  ------------------------------------------------------------------------------    Interval History: No acute events overnight. Today patient states her eye is feeling better.     MEDICATIONS  (STANDING):  amoxicillin  875 milliGRAM(s)/clavulanate 1 Tablet(s) Oral every 12 hours  ascorbic acid 500 milliGRAM(s) Oral daily  calcium carbonate 1250 mG  + Vitamin D (OsCal 500 + D) 1 Tablet(s) Oral daily  cyanocobalamin 1000 MICROGram(s) Oral daily  dexamethasone/neomycin/polymyxin Ointment 1 Application(s) Right EYE three times a day  dorzolamide 2%/timolol 0.5% Ophthalmic Solution 1 Drop(s) Right EYE every 12 hours  enoxaparin Injectable 40 milliGRAM(s) SubCutaneous every 24 hours  ergocalciferol 93274 Unit(s) Oral every week  fluticasone propionate 50 MICROgram(s)/spray Nasal Spray 1 Spray(s) Both Nostrils two times a day  folic acid 1 milliGRAM(s) Oral daily  levothyroxine 300 MICROGram(s) Oral daily  pantoprazole    Tablet 40 milliGRAM(s) Oral two times a day  predniSONE   Tablet 60 milliGRAM(s) Oral daily  saccharomyces boulardii 250 milliGRAM(s) Oral two times a day  sodium chloride 0.65% Nasal 1 Spray(s) Both Nostrils three times a day  sucralfate 1 Gram(s) Oral two times a day    MEDICATIONS  (PRN):  midodrine. 10 milliGRAM(s) Oral three times a day PRN SBP < 110      VITALS: T(C): 36.4 (01-16-23 @ 05:10)  T(F): 97.5 (01-16-23 @ 05:10), Max: 98 (01-15-23 @ 20:11)  HR: 63 (01-16-23 @ 05:10) (56 - 64)  BP: 111/72 (01-16-23 @ 05:10) (111/72 - 124/76)  RR:  (18 - 18)  SpO2:  (95% - 96%)  Wt(kg): --  General: AAO x 3, appropriate mood and affect    Ophthalmology Exam:  Visual acuity (cc): 20/50 OD; 20/50 OS PHNI OU  Pupils: PERRL OU, no RAPD  Ttono: 14 OU  Extraocular movements (EOMs): Full OU  Confrontational Visual Field (CVF): Full OU    Pen Light Exam (PLE)  External: periorbital edema and erythema OD, flat OS  Lids/Lashes/Lacrimal Ducts: No edema and erythema OD. Flat OS  Sclera/Conjunctiva: 2+ injection, trace chemosis OD. W+Q OS  Cornea: Cl OU  Anterior Chamber: D+F OU    Iris: Flat OU  Lens: NS OU

## 2023-01-16 NOTE — DISCHARGE NOTE PROVIDER - PROVIDER TOKENS
PROVIDER:[TOKEN:[[id number]],FOLLOWUP:[1-3 days]],PROVIDER:[TOKEN:[[id number]],FOLLOWUP:[1 week]],PROVIDER:[TOKEN:[[id number]],FOLLOWUP:[2 weeks]]

## 2023-01-16 NOTE — DISCHARGE NOTE PROVIDER - NSDCMRMEDTOKEN_GEN_ALL_CORE_FT
beta-carotene:   biotin 1000 mcg oral tablet: 0.5 tab(s) orally once a day  Calcium 600+D oral tablet:  orally 400 IU  Carafate 1 g oral tablet: 1 tab(s) orally 4 times a day (before meals and at bedtime)  copper gluconate 2 mg oral tablet: 1 tab(s) orally once a day  ferrous fumarate 324 mg (106 mg elemental iron) oral tablet: 1 tab(s) orally once a day  Florastor:  orally   fludrocortisone 0.1 mg oral tablet: 1 tab(s) orally once a day  folic acid 1 mg oral tablet:  orally   midodrine 10 mg oral tablet: 1 tab(s) orally 3 times a day, As Needed if sbp &lt;110  omeprazole 40 mg oral delayed release capsule: 1 cap(s) orally 2 times a day  saccharomyces boulardii lyo 250 mg oral capsule: 1 cap(s) orally 2 times a day  Tirosint-Sol 200 mcg (0.2 mg)/mL oral solution: 325 microgram(s) orally once a day  Vitamin B12:   Vitamin C 500 mg oral capsule: 1 cap(s) orally once a day  Vitamin D2 50,000 intl units (1.25 mg) oral capsule: 1 cap(s) orally once a week  Vitamin D3 25 mcg (1000 intl units) oral tablet: 6 tab(s) orally once a day  zinc (as gluconate) 15 mg oral tablet:    amoxicillin-clavulanate 875 mg-125 mg oral tablet: 1 tab(s) orally every 12 hours till 1/18  beta-carotene:   biotin 1000 mcg oral tablet: 0.5 tab(s) orally once a day  Calcium 600+D oral tablet:  orally 400 IU  copper gluconate 2 mg oral tablet: 1 tab(s) orally once a day  dorzolamide-timolol 2%-0.5% preservative-free ophthalmic solution: 1 drop(s) to each affected eye every 12 hours  ferrous fumarate 324 mg (106 mg elemental iron) oral tablet: 1 tab(s) orally once a day  fluticasone 50 mcg/inh nasal spray: 1 spray(s) nasal 2 times a day till 2/1  folic acid 1 mg oral tablet:  orally   midodrine 10 mg oral tablet: 1 tab(s) orally 3 times a day, As Needed if sbp &lt;110  neomycin/polymyxin B/dexamethasone 3.5 mg-10,000 units-1 mg/g ophthalmic ointment: 1 application to each affected eye 3 times a day  omeprazole 40 mg oral delayed release capsule: 1 cap(s) orally 2 times a day  predniSONE 20 mg oral tablet: 3 tab(s) orally once a day  saccharomyces boulardii lyo 250 mg oral capsule: 1 cap(s) orally 2 times a day  sodium chloride 0.65% nasal spray: 1 spray(s) nasal 3 times a day till 2/1  sucralfate 1 g oral tablet: 1 tab(s) orally 2 times a day  Tirosint-Sol 200 mcg (0.2 mg)/mL oral solution: 325 microgram(s) orally once a day  Vitamin B12:   Vitamin C 500 mg oral capsule: 1 cap(s) orally once a day  zinc (as gluconate) 15 mg oral tablet:

## 2023-01-16 NOTE — PROGRESS NOTE ADULT - PROBLEM SELECTOR PLAN 1
-appreciate ophthalmology recommendations a/w scleritis and orbital cellulitis.   - s/p Vancomycin and Unasyn (started 1/11 at night), not tolerating augment (will complete 7 day course)  as of 1/14 culture is negative  - scleritis - RF elevated 21 and 23, rest of rheum serology negative, syphilis negative   rheum input apprec, follow up serology  - c/w Maxitrol TID and Cosopt BID  - blood cultures negative  - was on solumedrol 500mg QD (started on 1/12), switched to oral prednisone 60mg 1/15, tolerating, will continue on discharge until follow up with uveitis specialists this week  d/c today with close ophth follow up

## 2023-01-16 NOTE — PROGRESS NOTE ADULT - PROBLEM SELECTOR PLAN 5
-hold home fludrocortisone while getting dexamethasone, restart fludrocortisone when not on other steroids  -c/w home midodrine PRN  - fall precautions

## 2023-01-16 NOTE — DISCHARGE NOTE PROVIDER - CARE PROVIDERS DIRECT ADDRESSES
,DirectAddress_Unknown,[email or web address]

## 2023-01-16 NOTE — PROGRESS NOTE ADULT - PROBLEM SELECTOR PROBLEM 2
Right maxillary sinusitis

## 2023-01-16 NOTE — PROGRESS NOTE ADULT - ASSESSMENT
47y female w/ pmhx/ochx of Downs syndrome, Duodenitis/Gastritis, Gilbert's syndrome, Hypothyroid, FE anemia, OCS, Ovarian cyst, ASD sp repair, Anxiety, Celiac dz, cataracts OD>OS, blepharitis, LAWRENCE, congenital nystagmus presenting for periorbital swelling OD of 2 days duration not improved on PO abx, found to have infectious vs. inflammatory scleritis vs. IOI. Less likely infectious orbital cellulitis.    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   - EOM full today   - Lid edema and erythema significantly improved   - DFE prior limited due to pt cooperation - possible trace optic nerve edema and temporal subretinal fluid vs. choroidal. B scan with thickened sclera and shallow choroidals indicative of scleritis.   - At this time, lower suspicion for infectious orbital cellulitis. No fat stranding extraconally near sinuses.  - ddx broad: infectious vs. inflammatory vs. idiopathic etiology  - Pending lysozyme, IgG4.   - CXR w/o hilar lymphadenopathy.  - Workup thus far: RF elevated, KAVON negative, ESR and CRP elevated, syphilis, ACE, p-ANCA, C-ANCA negative  - Appreciate rheumatology workup  - continue antibiotics per primary team  - Continue Oral Pred 60mg daily until patient sees uveitis specialist in clinic this week  - Ophtho will arrange outpatient appointment   - c/w maxitrol ointment TID right eye, cosopt BID right eye  - ENT input appreciated  - Please let Ophthalmology know if any acute changes occur    Pt discussed with Dr. Hallman, oculoplastics attending.    Outpatient follow-up: Patient should follow-up with his/her ophthalmologist or with [facility] Department of Ophthalmology at the address below     [address]. [address]  [place] [address]  [phone]      Kailey Del Valle MD, PGY-2  Contact: [employer]

## 2023-01-16 NOTE — DISCHARGE NOTE NURSING/CASE MANAGEMENT/SOCIAL WORK - PATIENT PORTAL LINK FT
You can access the FollowMyHealth Patient Portal offered by [facility] by registering at the following website: [email or web address]. By joining [facility]’s FollowMyHealth portal, you will also be able to view your health information using other applications (apps) compatible with our system.

## 2023-01-16 NOTE — DISCHARGE NOTE PROVIDER - CARE PROVIDER_API CALL
Lizzy Hallman)  Ophthalmology  [address], [address]  [place] [address]  Phone: [phone]  Fax: [phone]  Follow Up Time: 1-3 days    Yessi Douglas ()  Internal Medicine; Rheumatology  [address], [address]  [place] [address]  Phone: [phone]  Fax: [phone]  Follow Up Time: 1 week    Elsa Carrillo)  Otolaryngology Facial Plastics  [address], [address]  [place] [address]  Phone: [phone]  Fax: [phone]  Follow Up Time: 2 weeks

## 2023-01-16 NOTE — DISCHARGE NOTE PROVIDER - NSDCFUADDAPPT_GEN_ALL_CORE_FT
APPTS ARE READY TO BE MADE: [x ] YES    Best Family or Patient Contact (if needed): Father [phone]    Additional Information about above appointments (if needed):    1:   2:   3:     Other comments or requests:    APPTS ARE READY TO BE MADE: [x ] YES    Best Family or Patient Contact (if needed): Father [phone]    Additional Information about above appointments (if needed):    1:   2:   3:     Other comments or requests:   Patient was previously scheduled with Dr. Lizzy Hallman on 1/30 at 9:00am at [address] [facility]. Patient was provided with follow up request details and was advised to call to schedule follow up within specified time frame.  APPTS ARE READY TO BE MADE: [x ] YES    Best Family or Patient Contact (if needed): Father [phone]    Additional Information about above appointments (if needed):    1:   2:   3:     Other comments or requests:   Patient was previously scheduled with Dr. Lizzy Hallman on 1/30 at 9:00am at [address] [facility] and Dr. Elsa Carrillo on 2/1 at 2:15pm at [address] [facility]. Patient was scheduled with Dr. Yessi Douglas on 2/3 at 10:40am at [address] [facility].

## 2023-01-16 NOTE — DISCHARGE NOTE PROVIDER - NSDCCPCAREPLAN_GEN_ALL_CORE_FT
PRINCIPAL DISCHARGE DIAGNOSIS  Diagnosis: Scleritis, right  Assessment and Plan of Treatment: - Initial concern for orbital cellulitis but now suspicious for scleritis.   - Received IV Vancomycin and Unasyn now switched to PO augmentin  - Blood cultures negative  - Continue Augmentin till 1/18 to complete 7 day course  - Likely scleritis - RF elevated 21 and 23, rest of rheum serology negative, syphilis negative   - Continue with Maxitrol three times a day and Cosopt twice a day  - Received solumedrol. now tapered to PO prednisone 60mg daily  - Continue prednisone 60 mg and hold florinef for now  - If prednisone dose decreased to 40mg as outpatient then can resume home florinef  - Outpatient follow up        SECONDARY DISCHARGE DIAGNOSES  Diagnosis: Right maxillary sinusitis  Assessment and Plan of Treatment: - Seen by ENT  - Received IV Vancomycin and Unasyn now switched to PO augmentin  - Blood cultures negative  - Continue Augmentin till 1/18 to complete 7 day course  - flonase 1 spray bilateral x 3 weeks  - NS 0.65% nasal spray 1 spray bilateral nares three times a day x 3 weeks (1/12 - )      Diagnosis: Abnormal EKG  Assessment and Plan of Treatment: - Currently stable    Diagnosis: Orthostatic hypotension  Assessment and Plan of Treatment: -hold home fludrocortisone while getting high dose steroids, restart fludrocortisone when not on other steroids  -Continue with home midodrine PRN      Diagnosis: Hypothyroidism  Assessment and Plan of Treatment: - Continue with Tirosint-sol 325mcg     PRINCIPAL DISCHARGE DIAGNOSIS  Diagnosis: Scleritis, right  Assessment and Plan of Treatment: - Initial concern for orbital cellulitis but now suspicious for scleritis.   - Received IV Vancomycin and Unasyn now switched to PO augmentin  - Blood cultures negative  - Continue Augmentin till 1/18 to complete 7 day course  - Likely scleritis - RF elevated 21 and 23, rest of rheum serology negative, syphilis negative   - Continue with Maxitrol three times a day and Cosopt twice a day  - Received solumedrol. now tapered to PO prednisone 60mg daily  - Continue prednisone 60 mg and hold florinef for now given patient is on high dose steroid  - If prednisone dose decreased to 50mg QD->  then can resume home fludrocortisone   - Outpatient ophthalmology follow up this week         SECONDARY DISCHARGE DIAGNOSES  Diagnosis: Right maxillary sinusitis  Assessment and Plan of Treatment: - Seen by ENT  - Received IV Vancomycin and Unasyn now switched to PO augmentin  - Blood cultures negative  - Continue Augmentin till 1/18 to complete 7 day course  - Flonase 1 spray bilateral x 3 weeks till 2/1  - NS 0.65% nasal spray 1 spray bilateral nares three times a day x 3 weeks x 3 weeks (till 2/1)      Diagnosis: Abnormal EKG  Assessment and Plan of Treatment: - Currently stable    Diagnosis: Orthostatic hypotension  Assessment and Plan of Treatment: -Hold home fludrocortisone while getting high dose steroids,   - If prednisone dose titrated to 50mg QD or less than 50mg QD -> restart fludrocortisone as outpatient  - Continue with home midodrine PRN    Diagnosis: Hypothyroidism  Assessment and Plan of Treatment: - Continue with Tirosint-sol 325mcg

## 2023-01-16 NOTE — PROGRESS NOTE ADULT - PROBLEM SELECTOR PLAN 2
- appreciate ENT and ID recommendations  - c /w Unasyn , vanc  (1/12 -  ) plan 7 days of abx per ID  -  cultures negative  - afrin 1 spray, bilateral BID x3 days (1/12-1/14)  - flonase 1 spray b/l x 3 weeks  - NS 0.65% nasal spray 1 spray bilateral nares TID x 3 weeks (1/12 - )
- appreciate ENT and ID recommendations  - c /w Unasyn , vanc  (1/12 -  ) plan 7 days of abx per ID  -  cultures negative  - afrin 1 spray, bilateral BID x3 days (1/12-1/14)  - flonase 1 spray b/l x 3 weeks  - NS 0.65% nasal spray 1 spray bilateral nares TID x 3 weeks (1/12 - )
- appreciate ENT and ID recommendations  will complete 7 days of abx  -  cultures negative  - afrin 1 spray, bilateral BID x3 days (1/12-1/14)  - flonase 1 spray b/l x 3 weeks  - NS 0.65% nasal spray 1 spray bilateral nares TID x 3 weeks (1/12 - )
- appreciate ENT and ID recommendations  - c /w Unasyn , vanc  (1/12 -  ) plan 7 days of abx per ID  -  f/u sinus cultures  - afrin 1 spray, bilateral BID x3 days (1/12-1/14)  - flonase 1 spray b/l x 3 weeks  - NS 0.65% nasal spray 1 spray bilateral nares TID x 3 weeks (1/12 - )

## 2023-01-16 NOTE — PROGRESS NOTE ADULT - PROBLEM SELECTOR PLAN 3
-initial EKG was difficult to find association between p-wave and QRS, repeat EKG more consistent with APCs, personally discussed findings with on call cardiology fellow who agrees repeat EKG more consistent with APCs, no current symptoms of arrythmia, although pt had reported syncope in the past, pt has history of ASD s/p repair

## 2023-01-16 NOTE — PROGRESS NOTE ADULT - SUBJECTIVE AND OBJECTIVE BOX
PROGRESS NOTE:   Authored by Dr. Mirlande Lauren MD  Pager [phone]     Patient is a 47y old  Female who presents with a chief complaint of orbital cellulitis (16 Jan 2023 13:25)      SUBJECTIVE / OVERNIGHT EVENTS: Patient seen and examined at bedside. Patient feels very well, eye improved    ADDITIONAL REVIEW OF SYSTEMS: as above    MEDICATIONS  (STANDING):  amoxicillin  875 milliGRAM(s)/clavulanate 1 Tablet(s) Oral every 12 hours  ascorbic acid 500 milliGRAM(s) Oral daily  calcium carbonate 1250 mG  + Vitamin D (OsCal 500 + D) 1 Tablet(s) Oral daily  cyanocobalamin 1000 MICROGram(s) Oral daily  dexamethasone/neomycin/polymyxin Ointment 1 Application(s) Right EYE three times a day  dorzolamide 2%/timolol 0.5% Ophthalmic Solution 1 Drop(s) Right EYE every 12 hours  enoxaparin Injectable 40 milliGRAM(s) SubCutaneous every 24 hours  ergocalciferol 44114 Unit(s) Oral every week  fluticasone propionate 50 MICROgram(s)/spray Nasal Spray 1 Spray(s) Both Nostrils two times a day  folic acid 1 milliGRAM(s) Oral daily  levothyroxine 300 MICROGram(s) Oral daily  pantoprazole    Tablet 40 milliGRAM(s) Oral two times a day  predniSONE   Tablet 60 milliGRAM(s) Oral daily  saccharomyces boulardii 250 milliGRAM(s) Oral two times a day  sodium chloride 0.65% Nasal 1 Spray(s) Both Nostrils three times a day  sucralfate 1 Gram(s) Oral two times a day    MEDICATIONS  (PRN):  midodrine. 10 milliGRAM(s) Oral three times a day PRN SBP < 110      CAPILLARY BLOOD GLUCOSE        I&O's Summary    15 Delfino 2023 07:01  -  16 Jan 2023 07:00  --------------------------------------------------------  IN: 800 mL / OUT: 0 mL / NET: 800 mL    16 Jan 2023 07:01  -  16 Jan 2023 14:06  --------------------------------------------------------  IN: 480 mL / OUT: 0 mL / NET: 480 mL        PHYSICAL EXAM:  Vital Signs Last 24 Hrs  T(C): 36.3 (16 Jan 2023 13:47), Max: 36.7 (15 Delfino 2023 20:11)  T(F): 97.4 (16 Jan 2023 13:47), Max: 98 (15 Delfino 2023 20:11)  HR: 60 (16 Jan 2023 13:47) (56 - 64)  BP: 116/77 (16 Jan 2023 13:47) (111/72 - 124/76)  BP(mean): --  RR: 18 (16 Jan 2023 13:47) (18 - 18)  SpO2: 93% (16 Jan 2023 13:47) (93% - 96%)    Parameters below as of 16 Jan 2023 13:47  Patient On (Oxygen Delivery Method): room air        CONSTITUTIONAL: NAD, well-developed  Eyes: erythema, some discharge, EOM intact, pupil reactive  RESPIRATORY: Normal respiratory effort; lungs are clear to auscultation bilaterally  CARDIOVASCULAR: Regular rate and rhythm, normal S1 and S2, no murmur/rub/gallop; No lower extremity edema; Peripheral pulses are 2+ bilaterally  ABDOMEN: Nontender to palpation, normoactive bowel sounds, no rebound/guarding; No hepatosplenomegaly  MUSCLOSKELETAL: no clubbing or cyanosis of digits; no joint swelling or tenderness to palpation  PSYCH: A+O to person, place    LABS:                      RADIOLOGY & ADDITIONAL TESTS:  Results Reviewed:   Imaging Personally Reviewed:  Electrocardiogram Personally Reviewed:    COORDINATION OF CARE:  Care Discussed with Consultants/Other Providers [Y/N]:  Prior or Outpatient Records Reviewed [Y/N]:

## 2023-01-16 NOTE — PROGRESS NOTE ADULT - REASON FOR ADMISSION
orbital cellulitis

## 2023-01-16 NOTE — PROGRESS NOTE ADULT - PROBLEM SELECTOR PLAN 4
-c/w home Tirosint-sol 325mcg (confirmed dose with father, use Synthroid inpatient)  -TSH 3.35

## 2023-01-16 NOTE — PROGRESS NOTE ADULT - NSPROGADDITIONALINFOA_GEN_ALL_CORE
Discharge time 40min  Rx provided for augmentin, prednisone, and eye drops
d/w ophth and ID, will change to PO prednisone 60mg today which will cont until she follows up this week with the uveitis clinic. Per ID, will change to PO augmentin for 7 day course (total).  d/c planning tomorrow if tolerates PO meds
d/w patient and dad at bedside

## 2023-01-16 NOTE — PROGRESS NOTE ADULT - PROBLEM SELECTOR PLAN 6
dvt ppx: lovenox  diet: regular  ambulate: with assistance  gi ppx: home ppi    fall precautions  aspiration precautions

## 2023-01-16 NOTE — DISCHARGE NOTE PROVIDER - HOSPITAL COURSE
----- Message from Kiah Yang sent at 4/10/2017  1:15 PM CDT -----  Contact:   call  //[phone] /shamir  daughter    Calling about  [facility] // , pt  Is  Out  // please call  For  details  [facility] [id number] - [place], [place] - [address] AT [facility] [address]  [address]  [place] [address]  Phone: [phone] Fax: [phone]     46yo F w/ PMHx of Down Syndrome, Duodenitis/Gastritis , Gilbert's syndrome, Hypothyroid, Fe deficiency anemia, Ovarian cyst, ASD s/p repair, Anxiety, Celiac disease, presents with right eye pain, found to have orbital cellulitis vs scleritis and R maxillary sinusitis.       Problem/Plan - 1:  ·  Problem: Scleritis, right.   ·  Plan: -appreciate ophthalmology recommendations - initial concern for orbital cellulitis but now suspicious for scleritis.   - c/w Vancomycin and Unasyn for now per ID note (started 1/11 at night), as of 1/14 culture is negative  will d/w ID considering duration of therapy  - Likely scleritis - RF elevated 21 and 23, rest of rheum serology negative, syphilis negative   rheum input apprec, follow up serology  - c/w Maxitrol TID and Cosopt BID  - blood cultures negative  -s/p solumedrol 500mg QD (started on 1/12), now on PO prednisone 60mg  - outpatient follow up this week     Problem/Plan - 2:  ·  Problem: Right maxillary sinusitis.   ·  Plan: - appreciate ENT and ID recommendations  - c /w Unasyn , vanc  (1/12 -  ) plan 7 days of abx per ID  -  cultures negative  - afrin 1 spray, bilateral BID x3 days (1/12-1/14)  - flonase 1 spray b/l x 3 weeks  - NS 0.65% nasal spray 1 spray bilateral nares TID x 3 weeks (1/12 - ).     Problem/Plan - 3:  ·  Problem: Abnormal EKG.   ·  Plan: -initial EKG was difficult to find association between p-wave and QRS, repeat EKG more consistent with APCs, personally discussed findings with on call cardiology fellow who agrees repeat EKG more consistent with APCs, no current symptoms of arrythmia, although pt had reported syncope in the past, pt has history of ASD s/p repair.     Problem/Plan - 4:  ·  Problem: Hypothyroidism.   ·  Plan: -c/w home Tirosint-sol 325mcg (confirmed dose with father, use Synthroid inpatient)  -TSH 3.35.     Problem/Plan - 5:  ·  Problem: Orthostatic hypotension.   ·  Plan: -hold home fludrocortisone while getting dexamethasone, restart fludrocortisone when not on other steroids  -c/w home midodrine PRN  - fall precautions.      Pt is medically stable and cleared by Dr. Lauren to [place] [facility] [place] with close outpatient follow up. 48yo F w/ PMHx of Down Syndrome, Duodenitis/Gastritis , Gilbert's syndrome, Hypothyroid, Fe deficiency anemia, Ovarian cyst, ASD s/p repair, Anxiety, Celiac disease, presents with right eye pain, found to have orbital cellulitis vs scleritis and R maxillary sinusitis.      Problem/Plan - 1:  ·  Problem: Scleritis, right.   ·  Plan: -appreciate ophthalmology recommendations a/w scleritis and orbital cellulitis.   - s/p Vancomycin and Unasyn (started 1/11 at night), not tolerating augment (will complete 7 day course)  as of 1/14 culture is negative  - scleritis - RF elevated 21 and 23, rest of rheum serology negative, syphilis negative   rheum input apprec, follow up serology  - c/w Maxitrol TID and Cosopt BID  - blood cultures negative  - was on solumedrol 500mg QD (started on 1/12), switched to oral prednisone 60mg 1/15, tolerating, will continue on discharge until follow up with uveitis specialists this week  d/c today with close ophth follow up.    Problem/Plan - 2:  ·  Problem: Right maxillary sinusitis.   ·  Plan: - appreciate ENT and ID recommendations  will complete 7 days of abx  -  cultures negative  - afrin 1 spray, bilateral BID x3 days (1/12-1/14)  - flonase 1 spray b/l x 3 weeks  - NS 0.65% nasal spray 1 spray bilateral nares TID x 3 weeks (1/12 - ).    Problem/Plan - 3:  ·  Problem: Abnormal EKG.   ·  Plan: -initial EKG was difficult to find association between p-wave and QRS, repeat EKG more consistent with APCs, personally discussed findings with on call cardiology fellow who agrees repeat EKG more consistent with APCs, no current symptoms of arrythmia, although pt had reported syncope in the past, pt has history of ASD s/p repair.    Problem/Plan - 4:  ·  Problem: Hypothyroidism.   ·  Plan: -c/w home Tirosint-sol 325mcg (confirmed dose with father, use Synthroid inpatient)  -TSH 3.35.    Problem/Plan - 5:  ·  Problem: Orthostatic hypotension.   ·  Plan: -hold home fludrocortisone while getting dexamethasone, restart fludrocortisone when not on other steroids  -c/w home midodrine PRN        Pt is medically stable and cleared by Dr. Lauren to [place] group home with close outpatient follow up.

## 2023-01-16 NOTE — PROGRESS NOTE ADULT - PROVIDER SPECIALTY LIST ADULT
Ophthalmology
Infectious Disease
Ophthalmology
Ophthalmology
ENT
ENT
Hospitalist
Ophthalmology
ENT
Internal Medicine
Hospitalist
Hospitalist

## 2023-01-16 NOTE — PROGRESS NOTE ADULT - PROBLEM SELECTOR PROBLEM 1
Right maxillary sinusitis
Scleritis, right
Scleritis, right
Right maxillary sinusitis
Right maxillary sinusitis
Scleritis, right
Scleritis, right

## 2023-01-16 NOTE — DISCHARGE NOTE NURSING/CASE MANAGEMENT/SOCIAL WORK - NSDCFUADDAPPT_GEN_ALL_CORE_FT
APPTS ARE READY TO BE MADE: [x ] YES    Best Family or Patient Contact (if needed): Father [phone]    Additional Information about above appointments (if needed):    1:   2:   3:     Other comments or requests:

## 2023-01-17 ENCOUNTER — NON-APPOINTMENT (OUTPATIENT)
Age: 48
End: 2023-01-17

## 2023-01-17 LAB
ANTI-RIBONUCLEAR PROTEIN: <0.2 AI — SIGNIFICANT CHANGE UP
AUTO DIFF PNL BLD: ABNORMAL
C-ANCA SER-ACNC: NEGATIVE — SIGNIFICANT CHANGE UP
CULTURE RESULTS: SIGNIFICANT CHANGE UP
CULTURE RESULTS: SIGNIFICANT CHANGE UP
DSDNA AB FLD-ACNC: <0.2 AI — SIGNIFICANT CHANGE UP
DSDNA AB SER QL CLIF: NEGATIVE — SIGNIFICANT CHANGE UP
ENA SM AB FLD QL: <0.2 AI — SIGNIFICANT CHANGE UP
ENA SS-A AB FLD IA-ACNC: <0.2 AI — SIGNIFICANT CHANGE UP
IGG SERPL-MCNC: 812 MG/DL — SIGNIFICANT CHANGE UP (ref 586–1602)
IGG1 SER-MCNC: 430 MG/DL — SIGNIFICANT CHANGE UP (ref 248–810)
IGG2 SER-MCNC: 148 MG/DL — SIGNIFICANT CHANGE UP (ref 130–555)
IGG3 SER-MCNC: 104 MG/DL — HIGH (ref 15–102)
IGG4 SER-MCNC: 3 MG/DL — SIGNIFICANT CHANGE UP (ref 2–96)
P-ANCA SER-ACNC: NEGATIVE — SIGNIFICANT CHANGE UP
SPECIMEN SOURCE: SIGNIFICANT CHANGE UP
SPECIMEN SOURCE: SIGNIFICANT CHANGE UP

## 2023-01-18 LAB
CCP IGG SERPL-ACNC: <8 UNITS — SIGNIFICANT CHANGE UP
MYELOPEROXIDASE AB SER-ACNC: 5.7 UNITS — SIGNIFICANT CHANGE UP
MYELOPEROXIDASE CELLS FLD QL: NEGATIVE — SIGNIFICANT CHANGE UP
PROTEINASE3 AB FLD-ACNC: <5 UNITS — SIGNIFICANT CHANGE UP
PROTEINASE3 AB SER-ACNC: NEGATIVE — SIGNIFICANT CHANGE UP
RF+CCP IGG SER-IMP: NEGATIVE — SIGNIFICANT CHANGE UP

## 2023-01-19 ENCOUNTER — APPOINTMENT (OUTPATIENT)
Dept: OPHTHALMOLOGY | Facility: CLINIC | Age: 48
End: 2023-01-19

## 2023-01-20 ENCOUNTER — NON-APPOINTMENT (OUTPATIENT)
Age: 48
End: 2023-01-20

## 2023-01-23 LAB — HLA-B27 QL: NEGATIVE — SIGNIFICANT CHANGE UP

## 2023-01-24 ENCOUNTER — APPOINTMENT (OUTPATIENT)
Dept: ALLERGY | Facility: CLINIC | Age: 48
End: 2023-01-24
Payer: MEDICARE

## 2023-01-24 VITALS
DIASTOLIC BLOOD PRESSURE: 72 MMHG | HEIGHT: 60 IN | SYSTOLIC BLOOD PRESSURE: 118 MMHG | HEART RATE: 78 BPM | OXYGEN SATURATION: 96 % | BODY MASS INDEX: 26.5 KG/M2 | WEIGHT: 135 LBS

## 2023-01-24 PROCEDURE — 99203 OFFICE O/P NEW LOW 30 MIN: CPT

## 2023-01-24 NOTE — REVIEW OF SYSTEMS
[Nl] : Genitourinary [FreeTextEntry5] : S/P heart surgery - about 20 years ago.  [FreeTextEntry7] : celiac disease - bowel obstruction  [de-identified] : Down Syndrome

## 2023-01-24 NOTE — PHYSICAL EXAM
[Alert] : alert [Well Nourished] : well nourished [No Acute Distress] : no acute distress [Well Developed] : well developed [No Neck Mass] : no neck mass was observed [Normal Rate and Effort] : normal respiratory rhythm and effort [No Crackles] : no crackles [No Retractions] : no retractions [Bilateral Audible Breath Sounds] : bilateral audible breath sounds [Skin Intact] : skin intact  [No Rash] : no rash [Wheezing] : no wheezing was heard [de-identified] : systolic murmur

## 2023-01-24 NOTE — ASSESSMENT
[FreeTextEntry1] : Remote and vague history of Penicillin allergy - no documentation - father does not recall history - in patient with recent orbital . \par \par Patient to return for antibiotic skin testing - Penicillin and cephalosporin antibiotics.

## 2023-01-24 NOTE — HISTORY OF PRESENT ILLNESS
[de-identified] : Father relates history:\par \par About 15 years ago - father reports that he was told she was allergic amoxicillin and Penicillin but father is unsure why they were told that.   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   There was some confusion on how to treat patient with this vague history of Penicillin allergy.   Father would like this confusion clarified and resolved. \par \par Lorelei is followed for celiac disease.  She lives in group home. \par \par Patient has problems with gastric outlet obstruction and she require dilation in order for food passage.

## 2023-01-25 ENCOUNTER — LABORATORY RESULT (OUTPATIENT)
Age: 48
End: 2023-01-25

## 2023-02-01 ENCOUNTER — APPOINTMENT (OUTPATIENT)
Dept: ALLERGY | Facility: CLINIC | Age: 48
End: 2023-02-01
Payer: MEDICARE

## 2023-02-01 ENCOUNTER — APPOINTMENT (OUTPATIENT)
Dept: OTOLARYNGOLOGY | Facility: CLINIC | Age: 48
End: 2023-02-01
Payer: MEDICARE

## 2023-02-01 VITALS
OXYGEN SATURATION: 97 % | SYSTOLIC BLOOD PRESSURE: 110 MMHG | HEART RATE: 93 BPM | WEIGHT: 135 LBS | TEMPERATURE: 98.6 F | BODY MASS INDEX: 24.84 KG/M2 | DIASTOLIC BLOOD PRESSURE: 60 MMHG | HEIGHT: 62 IN

## 2023-02-01 VITALS
DIASTOLIC BLOOD PRESSURE: 65 MMHG | HEIGHT: 62 IN | WEIGHT: 135 LBS | BODY MASS INDEX: 24.84 KG/M2 | TEMPERATURE: 97.2 F | HEART RATE: 91 BPM | SYSTOLIC BLOOD PRESSURE: 106 MMHG

## 2023-02-01 DIAGNOSIS — H05.011 CELLULITIS OF RIGHT ORBIT: ICD-10-CM

## 2023-02-01 DIAGNOSIS — J01.00 ACUTE MAXILLARY SINUSITIS, UNSPECIFIED: ICD-10-CM

## 2023-02-01 DIAGNOSIS — R09.81 NASAL CONGESTION: ICD-10-CM

## 2023-02-01 LAB
25(OH)D3 SERPL-MCNC: 57.7 NG/ML
ALBUMIN SERPL ELPH-MCNC: 3.6 G/DL
ALP BLD-CCNC: 131 U/L
ALT SERPL-CCNC: 49 U/L
ANION GAP SERPL CALC-SCNC: 14 MMOL/L
AST SERPL-CCNC: 33 U/L
BASOPHILS # BLD AUTO: 0.05 K/UL
BASOPHILS NFR BLD AUTO: 0.6 %
BETA CAROTENE: 13 UG/DL
BILIRUB SERPL-MCNC: 0.6 MG/DL
BUN SERPL-MCNC: 30 MG/DL
CALCIUM SERPL-MCNC: 8.5 MG/DL
CHLORIDE SERPL-SCNC: 104 MMOL/L
CO2 SERPL-SCNC: 24 MMOL/L
COPPER SERPL-MCNC: 94 UG/DL
CREAT SERPL-MCNC: 0.85 MG/DL
EGFR: 85 ML/MIN/1.73M2
ENDOMYSIUM IGA SER QL: NEGATIVE
ENDOMYSIUM IGA TITR SER: NORMAL
EOSINOPHIL # BLD AUTO: 0.04 K/UL
EOSINOPHIL NFR BLD AUTO: 0.5 %
FERRITIN SERPL-MCNC: 103 NG/ML
FOLATE SERPL-MCNC: >20 NG/ML
GGT SERPL-CCNC: 17 U/L
GLIADIN IGA SER QL: 18.1 UNITS
GLIADIN IGG SER QL: <5 UNITS
GLIADIN PEPTIDE IGA SER-ACNC: NEGATIVE
GLIADIN PEPTIDE IGG SER-ACNC: NEGATIVE
GLUCOSE SERPL-MCNC: 111 MG/DL
HCT VFR BLD CALC: 43.6 %
HGB BLD-MCNC: 14 G/DL
IGA SER QL IEP: 222 MG/DL
IMM GRANULOCYTES NFR BLD AUTO: 1.2 %
IRON SATN MFR SERPL: 15 %
IRON SERPL-MCNC: 51 UG/DL
LYMPHOCYTES # BLD AUTO: 2.15 K/UL
LYMPHOCYTES NFR BLD AUTO: 27.7 %
MAGNESIUM SERPL-MCNC: 2.2 MG/DL
MAN DIFF?: NORMAL
MCHC RBC-ENTMCNC: 32.1 GM/DL
MCHC RBC-ENTMCNC: 34 PG
MCV RBC AUTO: 105.8 FL
MENADIONE SERPL-MCNC: 2.91 NG/ML
MONOCYTES # BLD AUTO: 1.04 K/UL
MONOCYTES NFR BLD AUTO: 13.4 %
NEUTROPHILS # BLD AUTO: 4.4 K/UL
NEUTROPHILS NFR BLD AUTO: 56.6 %
PLATELET # BLD AUTO: 294 K/UL
POTASSIUM SERPL-SCNC: 4.1 MMOL/L
PROT SERPL-MCNC: 6.2 G/DL
RBC # BLD: 4.12 M/UL
RBC # FLD: 17.8 %
SELENIUM SERPL-MCNC: 171 UG/L
SODIUM SERPL-SCNC: 142 MMOL/L
TIBC SERPL-MCNC: 341 UG/DL
TSH SERPL-ACNC: 0.32 UIU/ML
TTG IGA SER IA-ACNC: 2.3 U/ML
TTG IGA SER-ACNC: NEGATIVE
TTG IGG SER IA-ACNC: <1.2 U/ML
TTG IGG SER IA-ACNC: NEGATIVE
UIBC SERPL-MCNC: 290 UG/DL
VIT A SERPL-MCNC: 85.8 UG/DL
VIT B1 SERPL-MCNC: 220.7 NMOL/L
VIT B12 SERPL-MCNC: 1543 PG/ML
VIT B2 SERPL-MCNC: 350 UG/L
VIT B6 SERPL-MCNC: 94.9 UG/L
WBC # FLD AUTO: 7.77 K/UL
ZINC SERPL-MCNC: 82 UG/DL

## 2023-02-01 PROCEDURE — 31231 NASAL ENDOSCOPY DX: CPT

## 2023-02-01 PROCEDURE — 99214 OFFICE O/P EST MOD 30 MIN: CPT | Mod: 25

## 2023-02-01 PROCEDURE — 95018 ALL TSTG PERQ&IQ DRUGS/BIOL: CPT

## 2023-02-01 NOTE — HISTORY OF PRESENT ILLNESS
[de-identified] : Ms. FORTUNE is a 47 year female here with dad, with Downs, Smilax syndrome, celiac who lives in group home. C/o R eye Swelling started 1/9 seen by PCP and treated with Zpack, sent to ED [facility] 1/11/23 by opthamologist Dr. Clinton, with preseptal cellulitis.  CT showed R maxillary opacification. pt did note she had nasal congestion for 2 weeks prior\par - denies nasal congestion today, not using any nasal sprays or rinses\par - completed course of Augmentin 2 days ago as well as eye ointment \par - seen by Allergy Dr. Boxer today and tested negative for allergy to Cefuroxine and Cefazolin\par - CT sinus reviewed

## 2023-02-01 NOTE — ASSESSMENT
[FreeTextEntry1] : Skin testing to PrePen, Pen G, cefazolin and cefuroxime negative - RV oral challenge to amoxicillin.

## 2023-02-01 NOTE — PHYSICAL EXAM
[Nasal Endoscopy Performed] : nasal endoscopy was performed, see procedure section for findings [Normal] : no rashes [de-identified] : R scleral erythema

## 2023-02-01 NOTE — PROCEDURE
[FreeTextEntry6] : reason for exam: anterior rhinoscopy insufficient for symptom evaluation \par \par Fiberoptic nasal endoscopy was performed.  R/b/a of procedure was explained to the patient and they agreed to proceed with procedure.  Nasal cavities were treated with afrin and lidocaine prior to nasal endoscopy to make the patient more comfortable.  normal mucosa, normal b/l inferior, middle and superior turbinates, inferior, middle and superior meati and sphenoethmoidal recess.  No nasal polyps.  Septum midline OMC CLEAR\par \par scope #: 26\par \par

## 2023-02-01 NOTE — CONSULT LETTER
[Dear  ___] : Dear  [unfilled], [Consult Letter:] : I had the pleasure of evaluating your patient, [unfilled]. [Sincerely,] : Sincerely, [FreeTextEntry3] : Elsa Carrillo MD\par Otolaryngology- Facial Plastics \par [address]\[place] [address]\par (P) - [phone]\par (F) - [phone]\par

## 2023-02-01 NOTE — ASSESSMENT
[FreeTextEntry1] : Ms. FORTUNE is a 47 year female S/P admission for acute R max sinusitis and orbital cellulitis, Doing much better today, she completed course of Augmentin and is feeling better today. On exam today b/l OMC clear, no purulence, still with some R sclera erythema \par \par - rec f/u with Optho Dr. Clinton next week\par - repeat scan Ct sinus without contrast - will call with results and if sinus look clear at that point we will f/u as needed, if scan shows persistent sinus disease, will refer to Dr. Paez (or other sinus specialist due to unusual anatomy) for likely surgical management\par  - rx Flonase use 4-6 weeks daily \par - educated on appropriate use including daily use and spraying left nostril with right hand and vice versa\par - nasal saline spray twice daily\par \par \par \par

## 2023-02-01 NOTE — END OF VISIT
[FreeTextEntry3] : I personally saw and examined RICARDO FORTUNE in detail.  I spoke to OPAL San regarding the assessment and plan of care. I performed the procedures and relevant physical exam.  I have reviewed the above assessment and plan of care and I agree.  I have made changes to the body of the note wherever necessary and appropriate.\par

## 2023-02-02 ENCOUNTER — NON-APPOINTMENT (OUTPATIENT)
Age: 48
End: 2023-02-02

## 2023-02-03 ENCOUNTER — APPOINTMENT (OUTPATIENT)
Dept: RHEUMATOLOGY | Facility: CLINIC | Age: 48
End: 2023-02-03
Payer: MEDICARE

## 2023-02-03 VITALS
SYSTOLIC BLOOD PRESSURE: 124 MMHG | TEMPERATURE: 97.4 F | DIASTOLIC BLOOD PRESSURE: 75 MMHG | HEART RATE: 103 BPM | BODY MASS INDEX: 25.03 KG/M2 | OXYGEN SATURATION: 96 % | HEIGHT: 62 IN | WEIGHT: 136 LBS

## 2023-02-03 DIAGNOSIS — R76.12 NONSPECIFIC REACTION TO CELL MEDIATED IMMUNITY MEASUREMENT OF GAMMA INTERFERON ANTIGEN RESPONSE W/OUT ACTIVE TUBERCULOSIS: ICD-10-CM

## 2023-02-03 DIAGNOSIS — Z00.00 ENCOUNTER FOR GENERAL ADULT MEDICAL EXAMINATION W/OUT ABNORMAL FINDINGS: ICD-10-CM

## 2023-02-03 DIAGNOSIS — H15.009 UNSPECIFIED SCLERITIS, UNSPECIFIED EYE: ICD-10-CM

## 2023-02-03 PROCEDURE — 99214 OFFICE O/P EST MOD 30 MIN: CPT

## 2023-02-03 NOTE — PHYSICAL EXAM
[General Appearance - Alert] : alert [General Appearance - In No Acute Distress] : in no acute distress [Conjunctival Hyperemia Bilateral] : hyperemia [FreeTextEntry1] : No synovitis

## 2023-02-03 NOTE — HISTORY OF PRESENT ILLNESS
[FreeTextEntry1] : Hospital course\par 48 yo F PMH Down Syndrome, Duodenitis/Gastritis, Gilbert's syndrome,\par Hypothyroidism, Ovarian cyst, ASD s/p repair, Anxiety, Celiac disease, sent\par from Group home on 1/12 with right eye pain found to have maxillary sinusitis\par (on CT, w/ runny nose/congestion) and R eye possible optic nerve\par edema/scleritis. Rheumatology consulted for secondary rheumatologic causes of\par scleritis\par  \par R eye possible optic nerve edema/scleritis\par -low suspicion for infection per optho\par -s/p decadron and Solu-Medrol optho/ENT, pt w/ improvement in scleritis symptoms\par -on abx for sinusitis\par -rheumatologic causes of scleritis include RA, SLE, MCTD, reactive arthritis,\par Ankylosing spondylitis, GPA, gout, syphilis\par -pt does not have any stigmata of above autoimmune diseases\par  \par Serology:\par -RF 21, / (could be elevated 2/2 sinusitis)\par -negative KAVON, syphilis\par  \par Interval history\par ____________ \par Reports improvement in eye redness pain and swelling.\par Hospital lab records reviewed.\par Complement levels within normal limits.  QuantiFERON indeterminate.  Hepatitis serology negative.  \par ANCA, MPO AZ-3 are negative.  PATRICIA is negative.  SSA and SSB negative.  HLA-B27 negative.\par

## 2023-02-07 ENCOUNTER — APPOINTMENT (OUTPATIENT)
Dept: ALLERGY | Facility: CLINIC | Age: 48
End: 2023-02-07
Payer: MEDICARE

## 2023-02-07 PROCEDURE — 95076 INGEST CHALLENGE INI 120 MIN: CPT

## 2023-02-07 NOTE — IMPRESSION
[FreeTextEntry2] : Patient tolerated her amoxicillin oral challenge with no immediate allergic reaction.

## 2023-02-09 ENCOUNTER — OUTPATIENT (OUTPATIENT)
Dept: OUTPATIENT SERVICES | Facility: HOSPITAL | Age: 48
LOS: 1 days | End: 2023-02-09
Payer: MEDICARE

## 2023-02-09 ENCOUNTER — APPOINTMENT (OUTPATIENT)
Dept: CT IMAGING | Facility: CLINIC | Age: 48
End: 2023-02-09
Payer: MEDICARE

## 2023-02-09 DIAGNOSIS — Z98.89 OTHER SPECIFIED POSTPROCEDURAL STATES: Chronic | ICD-10-CM

## 2023-02-09 DIAGNOSIS — J01.00 ACUTE MAXILLARY SINUSITIS, UNSPECIFIED: ICD-10-CM

## 2023-02-09 DIAGNOSIS — H05.011 CELLULITIS OF RIGHT ORBIT: ICD-10-CM

## 2023-02-09 DIAGNOSIS — Q21.1 ATRIAL SEPTAL DEFECT: Chronic | ICD-10-CM

## 2023-02-09 PROCEDURE — 70486 CT MAXILLOFACIAL W/O DYE: CPT

## 2023-02-09 PROCEDURE — 70486 CT MAXILLOFACIAL W/O DYE: CPT | Mod: 26,MH

## 2023-02-13 ENCOUNTER — NON-APPOINTMENT (OUTPATIENT)
Age: 48
End: 2023-02-13

## 2023-02-14 ENCOUNTER — NON-APPOINTMENT (OUTPATIENT)
Age: 48
End: 2023-02-14

## 2023-02-15 ENCOUNTER — NON-APPOINTMENT (OUTPATIENT)
Age: 48
End: 2023-02-15

## 2023-03-22 LAB
M TB IFN-G BLD-IMP: NEGATIVE
QUANTIFERON TB PLUS MITOGEN MINUS NIL: >10 IU/ML
QUANTIFERON TB PLUS NIL: 0.03 IU/ML
QUANTIFERON TB PLUS TB1 MINUS NIL: 0 IU/ML
QUANTIFERON TB PLUS TB2 MINUS NIL: 0 IU/ML

## 2023-05-03 ENCOUNTER — APPOINTMENT (OUTPATIENT)
Dept: PEDIATRIC ALLERGY IMMUNOLOGY | Facility: CLINIC | Age: 48
End: 2023-05-03

## 2023-07-24 RX ORDER — FLUTICASONE PROPIONATE 50 UG/1
50 SPRAY, METERED NASAL DAILY
Qty: 1 | Refills: 2 | Status: ACTIVE | COMMUNITY
Start: 2023-02-01 | End: 1900-01-01

## 2023-08-08 ENCOUNTER — APPOINTMENT (OUTPATIENT)
Dept: GASTROENTEROLOGY | Facility: CLINIC | Age: 48
End: 2023-08-08
Payer: MEDICARE

## 2023-08-08 ENCOUNTER — LABORATORY RESULT (OUTPATIENT)
Age: 48
End: 2023-08-08

## 2023-08-08 VITALS
HEART RATE: 82 BPM | SYSTOLIC BLOOD PRESSURE: 100 MMHG | TEMPERATURE: 97.1 F | DIASTOLIC BLOOD PRESSURE: 60 MMHG | BODY MASS INDEX: 25.95 KG/M2 | HEIGHT: 62 IN | WEIGHT: 141 LBS | OXYGEN SATURATION: 97 %

## 2023-08-08 PROCEDURE — 99215 OFFICE O/P EST HI 40 MIN: CPT | Mod: 25

## 2023-08-08 PROCEDURE — 36415 COLL VENOUS BLD VENIPUNCTURE: CPT

## 2023-08-08 RX ORDER — CALCIUM CARBONATE/VITAMIN D3 600 MG-10
600-400 TABLET ORAL
Refills: 0 | Status: ACTIVE | COMMUNITY

## 2023-08-08 RX ORDER — MINOXIDIL 10 MG/1
10 TABLET ORAL
Refills: 0 | Status: ACTIVE | COMMUNITY

## 2023-08-08 RX ORDER — LEVOTHYROXINE SODIUM 62.5 UG/1
CAPSULE ORAL
Refills: 0 | Status: ACTIVE | COMMUNITY

## 2023-08-08 RX ORDER — LACTASE 3000 UNIT
3000 TABLET ORAL
Refills: 0 | Status: ACTIVE | COMMUNITY

## 2023-08-08 RX ORDER — OLOPATADINE HYDROCHLORIDE 2 MG/ML
0.2 SOLUTION OPHTHALMIC
Refills: 0 | Status: ACTIVE | COMMUNITY

## 2023-08-08 RX ORDER — SALINE NASAL SPRAY 1.5 OZ
SOLUTION NASAL
Refills: 0 | Status: ACTIVE | COMMUNITY

## 2023-08-08 RX ORDER — CHLORHEXIDINE GLUCONATE 4 %
325 (65 FE) LIQUID (ML) TOPICAL
Refills: 0 | Status: ACTIVE | COMMUNITY

## 2023-08-11 NOTE — HISTORY OF PRESENT ILLNESS
[FreeTextEntry1] : The patient was seen with the patient's father, who provided most of the information.  The patient has Down syndrome and celiac disease.  On her last blood work performed on January 25, 2023, celiac labs had normalized for the first time along with normalization of multiple vitamin deficiencies including copper, vitamin K, and vitamin A.  At that time ALT and alk phos were mildly elevated at 49 and 131 respectively.  The patient has been followed by Dr. Peng at [facility] for her gastric outlet obstruction.  She has had multiple endoscopies since I last saw the patient on Leelee 10, 2022.  The last EGD and EUS was performed on July 11, 2023 at which time a metal stent was placed across the gastrojejunostomy.  She also had endoscopies on July 11, 2022, December 7, 2022, and April 14, 2023.  It is not clear if small bowel biopsies were taken on any of these endoscopies.  The patient lives in a group home.  80% of her food is prepared by her father and is strictly gluten-free.  There is some question in the father's mind as to whether the facility is is careful regarding gluten exposure as they should be.  The patient has no obvious pain and denies vomiting.  She has daily solid bowel movements.  She denies melena or bright red blood per rectum.  The patient has gained 28 pounds.   Note from 6/10/2022 - The patient has Down syndrome and celiac disease.  She lives in a group home where she is reportedly on a gluten-free diet.  She is seeing me for the first time since August 11, 2021.  At that time, blood work revealed abnormal celiac markers with tissue transglutaminase IgA weakly positive at 9.1 with normal IgG and positive anti-deamidated gliadin IgA of 71.7.  The patient had borderline iron deficiency anemia, elevated alkaline phosphatase, decreased vitamin D, B12, and carotene levels.  After this visit, the patient saw my dietitian and efforts were made to ensure strict gluten-free diet at her group home.  Since then, the patient was hospitalized at [facility] from April 26 to May 2 with hematemesis and abdominal pain.  She had 3 endoscopies by Dr. Elliott Peng and received blood transfusions.  She was found to have gastric and duodenal ulcers as well as stenosis of the gastroenterostomy in the gastric body which was dilated to 18 mm.  There appeared to be a stenosis of the jejunal jejunal anastomosis.  It is unclear if small bowel biopsies were taken during these procedures.  The patient is currently on omeprazole 40 mg twice daily and Carafate twice daily as well as B12, vitamin D, and beta-carotene supplementation.  She gets pain in the abdomen bilaterally but does not vomit.  She has 4 solid bowel movements a day without melena.  She had a small amount of blood on 1 occasion which was likely related to hemorrhoids.   Note from 8/11/2021 - The patient is a 45-year-old woman with Down syndrome who was seen with her father.  She was diagnosed with celiac disease 15 years ago by Dr. Noel.  I do not have the initial studies at which time the diagnosis was made.  The patient had a duodenal stricture which required gastrojejunostomy in 1997.  This has been revised on 2 occasions the patient has had recurrent stricturing at the gastrojejunal anastomosis for which she has undergone revealed dilatations approximately every 3 months.  She has seen multiple gastroenterologists for this.  Most recently, she had a stent placed by Dr. March in March but this was removed on April 30, 2021 due to diarrhea.  At the time of that endoscopy, small bowel biopsies were taken which showed moderate villous blunting with increased inflammatory cells in the lamina propria and slightly increased intraepithelial lymphocytes.  The patient has subsequently been seen by Dr. Guadarrama at [facility], who performed an enteroscopy on July 8, 2021 with dilatation.  At that time note was made of scalloped and atrophic appearing villi in the small bowel and an area of ulceration in the gastric body.  The patient was hospitalized on July 14, 2021 for hematemesis.  Another EGD was performed on July 15, 2021 which showed a 3 mm clean-based ulceration in the beginning of the gastrojejunostomy.  The patient is currently on Zegerid 20 mg twice daily.  She was on Carafate but this has been stopped. Dr. Guadarrama has recommended endoscopic gastrojejunostomy as an alternative to recurrent dilatations.  As far as the celiac disease goes, no blood work has been done in some time.  The patient lives in a group home where they are aware of the need for a gluten-free diet but it does appear that there is significant room for contact and cross-contamination.  The patient spends a good deal of time at her father's house, where he reports a strict gluten-free diet.  The patient's mother has passed away.  She complains of right sided abdominal pain.  She has recently gained weight and is not currently vomiting although she vomits frequently as she develops obstruction.  The patient has 4 solid bowel movements a day.  She denies melena or bright red blood per rectum.

## 2023-08-11 NOTE — CONSULT LETTER
[FreeTextEntry1] : Dear Dr. Sue Aguilar and Dr. Elliott Peng,\par  \par  I had the pleasure of seeing your patient RICARDO FORTUNE in the office today.  My office note is attached. PLEASE READ THE "ASSESSMENT" SECTION OF THE NOTE TO SEE MY IMPRESSION AND PLAN.\par  \par  Thank you very much for allowing me to participate in the care of your patient.\par  \par  Sincerely,\par  \par  Robin [place] M.D., FACG, FACP\par  Director, Celiac Program at [facility]\par   of Medicine, [employer] at \[facility]\""/[facility]  Adjunct  of Medicine, [facility] [facility] [employer] [facility] of [facility]  Practice Director,  [facility] [place]\[facility]  [address]\[place] [address]\par  Tel: [phone]\par  Email: [email or web address]\par  \par  \par  The attached note has been created using a voice recognition system (Dragon).  There may be some misspellings and typos.  Please call my office if you have any issues or questions.

## 2023-08-11 NOTE — ASSESSMENT
[FreeTextEntry1] : Patient with Down syndrome and celiac disease who had normalization of her celiac serologies in January.  She had mildly elevated liver tests.  She is doing well although there is some question as to whether or not she is getting gluten exposure in her group home.  The patient appears to be feeling well.  Bloodwork was sent for CBC, chem-pack, TSH, celiac markers, iron studies, vitamin B12, folate, vitamin A, vitamin D, vitamin K, magnesium, carotene, vitamin B6, zinc, copper, vitamin B1, vitamin B2, selenium, vitamin B3, vitamin B5, vitamin C.  If the liver tests are still abnormal, I will send the patient for an abdominal ultrasound.  We would also refer the patient for hepatology evaluation.  I have asked the patient's father to ask Dr. Peng to take multiple biopsies from the small bowel during the patient's next endoscopy in order to assess the patient's celiac disease.  I am also sending this letter to Dr. Peng to convey this request directly.  The patient must remain on a strict gluten-free diet free of contact and cross-contamination.   Plan from 6/10/2022 - Patient with Down syndrome and celiac disease who had mildly abnormal celiac serologies last summer.  She is status post hospitalization for GI bleed with gastric and duodenal ulcers.  She has a complicated history with multiple gastric surgeries and dilatations of strictures.  She had an elevated alkaline phosphatase last summer.  After the last visit, my dietitian and I made significant efforts to assure that the patient was provided with a gluten-free environment at her group home.  Bloodwork was sent for CBC, chem-pack, TSH, celiac markers, iron studies, B12, folate, vitamin A, vitamin D, vitamin K, magnesium, carotene, vitamin B6, zinc, copper, vitamin B1, vitamin B2.  PT, alpha-1 antitrypsin, alpha-fetoprotein, KAVON, ANCA, ceruloplasmin, iron studies, GGTP, celiac markers, hepatitis A., B., C. serologies, lipid profile, AMA, anti-smooth muscle antibody, anti-LKM antibody, anti-soluble liver antigen antibody, and alkaline phosphatase isoenzymes.  I am deferring endoscopic evaluations to Dr. Peng.  I have asked the patient to get me any biopsy results from the previous endoscopies and to ask that multiple small bowel biopsies be taken at the time of future endoscopy.   Plan from 8/11/2021 - Patient with Down syndrome and a diagnosis of celiac disease made 15 years ago.  Recent pathology showed moderate villous blunting with slightly increased intraepithelial lymphocytes and increased inflammatory cells in the lamina propria.  Although efforts are made for the patient to be on a strict gluten-free diet, there does appear to be significant risk of contact and cross-contamination at the patient's group home.She has had multiple gastric surgeries and repeated dilatations of a gastrojejunostomy stricture.  I had a long discussion with the patient and her father regarding celiac disease, the gluten-free diet, the concepts of contact and cross contamination, and the potential complications of celiac disease. We also discussed the need for ongoing followup.  Bloodwork was sent for CBC, chem-pack, TSH, celiac markers, iron studies, B12, folate, vitamin A, vitamin D, vitamin K, magnesium, carotene, vitamin B6, zinc, and celiac HLA testing.  Patient was sent for a bone density scan.  I have provided the patient with Zegerid 20 mg twice daily and Carafate 10 cc 4 times daily given the recent finding of a small ulcer at the surgical anastomosis.  I explained to the patient that I do not have expertise when it comes to dilatation and stenting or advanced endoscopy.  I deferred management decisions and intervention regarding this to Dr. Guadarrama.

## 2023-08-16 LAB
25(OH)D3 SERPL-MCNC: 53.8 NG/ML
ALBUMIN SERPL ELPH-MCNC: 3.8 G/DL
ALP BLD-CCNC: 159 U/L
ALT SERPL-CCNC: 37 U/L
ANION GAP SERPL CALC-SCNC: 10 MMOL/L
AST SERPL-CCNC: 32 U/L
BETA CAROTENE: 3 UG/DL
BILIRUB SERPL-MCNC: 0.6 MG/DL
BUN SERPL-MCNC: 18 MG/DL
CALCIUM SERPL-MCNC: 8.8 MG/DL
CHLORIDE SERPL-SCNC: 102 MMOL/L
CO2 SERPL-SCNC: 27 MMOL/L
COPPER SERPL-MCNC: 28 UG/DL
CREAT SERPL-MCNC: 0.64 MG/DL
EGFR: 110 ML/MIN/1.73M2
ENDOMYSIUM IGA SER QL: NEGATIVE
ENDOMYSIUM IGA TITR SER: NORMAL
FERRITIN SERPL-MCNC: 56 NG/ML
FOLATE SERPL-MCNC: >20 NG/ML
GGT SERPL-CCNC: 9 U/L
GLIADIN IGA SER QL: 103.7 UNITS
GLIADIN IGG SER QL: <5 UNITS
GLIADIN PEPTIDE IGA SER-ACNC: POSITIVE
GLIADIN PEPTIDE IGG SER-ACNC: NEGATIVE
GLUCOSE SERPL-MCNC: 90 MG/DL
IGA SER QL IEP: 543 MG/DL
IRON SATN MFR SERPL: 15 %
IRON SERPL-MCNC: 51 UG/DL
MAGNESIUM SERPL-MCNC: 2 MG/DL
MENADIONE SERPL-MCNC: 0.38 NG/ML
NICOTINAMIDE: 147.4 NG/ML
NICOTINIC ACID: <5 NG/ML
PANTOTHENATE SERPLBLD-MCNC: 313.6 NG/ML
POTASSIUM SERPL-SCNC: 4.3 MMOL/L
PROT SERPL-MCNC: 6.4 G/DL
SELENIUM SERPL-MCNC: 108 UG/L
SODIUM SERPL-SCNC: 140 MMOL/L
TIBC SERPL-MCNC: 345 UG/DL
TSH SERPL-ACNC: <0.01 UIU/ML
TTG IGA SER IA-ACNC: 10.3 U/ML
TTG IGA SER-ACNC: POSITIVE
TTG IGG SER IA-ACNC: <1.2 U/ML
TTG IGG SER IA-ACNC: NEGATIVE
UIBC SERPL-MCNC: 294 UG/DL
VIT A SERPL-MCNC: 35.7 UG/DL
VIT B1 SERPL-MCNC: 136.7 NMOL/L
VIT B12 SERPL-MCNC: 650 PG/ML
VIT B2 SERPL-MCNC: 277 UG/L
VIT B6 SERPL-MCNC: 82 UG/L
VIT C SERPL-MCNC: 0.8 MG/DL
ZINC SERPL-MCNC: 47 UG/DL

## 2023-09-01 ENCOUNTER — APPOINTMENT (OUTPATIENT)
Dept: ULTRASOUND IMAGING | Facility: CLINIC | Age: 48
End: 2023-09-01
Payer: MEDICARE

## 2023-09-01 ENCOUNTER — OUTPATIENT (OUTPATIENT)
Dept: OUTPATIENT SERVICES | Facility: HOSPITAL | Age: 48
LOS: 1 days | End: 2023-09-01
Payer: MEDICARE

## 2023-09-01 DIAGNOSIS — Z98.89 OTHER SPECIFIED POSTPROCEDURAL STATES: Chronic | ICD-10-CM

## 2023-09-01 DIAGNOSIS — R79.89 OTHER SPECIFIED ABNORMAL FINDINGS OF BLOOD CHEMISTRY: ICD-10-CM

## 2023-09-01 DIAGNOSIS — K90.0 CELIAC DISEASE: ICD-10-CM

## 2023-09-01 DIAGNOSIS — Z00.8 ENCOUNTER FOR OTHER GENERAL EXAMINATION: ICD-10-CM

## 2023-09-01 DIAGNOSIS — Q21.1 ATRIAL SEPTAL DEFECT: Chronic | ICD-10-CM

## 2023-09-01 PROCEDURE — 76700 US EXAM ABDOM COMPLETE: CPT | Mod: 26

## 2023-09-01 PROCEDURE — 76700 US EXAM ABDOM COMPLETE: CPT

## 2023-09-20 ENCOUNTER — APPOINTMENT (OUTPATIENT)
Dept: HEPATOLOGY | Facility: CLINIC | Age: 48
End: 2023-09-20
Payer: MEDICARE

## 2023-09-20 VITALS
HEART RATE: 80 BPM | BODY MASS INDEX: 25.21 KG/M2 | DIASTOLIC BLOOD PRESSURE: 60 MMHG | OXYGEN SATURATION: 97 % | WEIGHT: 137 LBS | SYSTOLIC BLOOD PRESSURE: 110 MMHG | TEMPERATURE: 98 F | HEIGHT: 62 IN

## 2023-09-20 DIAGNOSIS — R79.89 OTHER SPECIFIED ABNORMAL FINDINGS OF BLOOD CHEMISTRY: ICD-10-CM

## 2023-09-20 PROCEDURE — 99214 OFFICE O/P EST MOD 30 MIN: CPT

## 2023-09-20 RX ORDER — MAGNESIUM 200 MG
1000 TABLET ORAL DAILY
Qty: 90 | Refills: 2 | Status: DISCONTINUED | COMMUNITY
Start: 2021-08-27 | End: 2023-09-20

## 2023-09-20 RX ORDER — PHYTONADIONE (VIT K1) 100 MCG
100 TABLET ORAL
Qty: 90 | Refills: 0 | Status: DISCONTINUED | COMMUNITY
Start: 2022-06-30 | End: 2023-09-20

## 2023-09-20 RX ORDER — AMOXICILLIN 250 MG/5ML
250 POWDER, FOR SUSPENSION ORAL 3 TIMES DAILY
Qty: 1 | Refills: 0 | Status: DISCONTINUED | COMMUNITY
Start: 2023-02-01 | End: 2023-09-20

## 2023-09-20 RX ORDER — PENICILLIN G POTASSIUM 5000000 [IU]/1
5000000 INJECTION, POWDER, FOR SOLUTION INTRAMUSCULAR; INTRAVENOUS
Qty: 1 | Refills: 0 | Status: DISCONTINUED | COMMUNITY
Start: 2023-01-25 | End: 2023-09-20

## 2023-10-16 ENCOUNTER — APPOINTMENT (OUTPATIENT)
Dept: HEPATOLOGY | Facility: CLINIC | Age: 48
End: 2023-10-16
Payer: MEDICARE

## 2023-10-16 DIAGNOSIS — K76.0 FATTY (CHANGE OF) LIVER, NOT ELSEWHERE CLASSIFIED: ICD-10-CM

## 2023-10-16 PROCEDURE — 76981 USE PARENCHYMA: CPT

## 2023-10-20 PROBLEM — K76.0 NAFLD (NONALCOHOLIC FATTY LIVER DISEASE): Status: ACTIVE | Noted: 2023-09-20

## 2023-11-17 ENCOUNTER — NON-APPOINTMENT (OUTPATIENT)
Age: 48
End: 2023-11-17

## 2024-01-05 ENCOUNTER — NON-APPOINTMENT (OUTPATIENT)
Age: 49
End: 2024-01-05

## 2024-01-19 RX ORDER — COPPER GLUCONATE 2 MG
2 TABLET ORAL
Qty: 90 | Refills: 1 | Status: ACTIVE | COMMUNITY
Start: 2022-09-21 | End: 1900-01-01

## 2024-02-02 ENCOUNTER — NON-APPOINTMENT (OUTPATIENT)
Age: 49
End: 2024-02-02

## 2024-02-15 ENCOUNTER — OUTPATIENT (OUTPATIENT)
Dept: OUTPATIENT SERVICES | Facility: HOSPITAL | Age: 49
LOS: 1 days | End: 2024-02-15
Payer: MEDICARE

## 2024-02-15 VITALS
TEMPERATURE: 98 F | OXYGEN SATURATION: 100 % | HEART RATE: 93 BPM | SYSTOLIC BLOOD PRESSURE: 133 MMHG | HEIGHT: 62.6 IN | WEIGHT: 149.03 LBS | DIASTOLIC BLOOD PRESSURE: 86 MMHG | RESPIRATION RATE: 18 BRPM

## 2024-02-15 DIAGNOSIS — Z96.641 PRESENCE OF RIGHT ARTIFICIAL HIP JOINT: Chronic | ICD-10-CM

## 2024-02-15 DIAGNOSIS — Z01.818 ENCOUNTER FOR OTHER PREPROCEDURAL EXAMINATION: ICD-10-CM

## 2024-02-15 DIAGNOSIS — Z98.89 OTHER SPECIFIED POSTPROCEDURAL STATES: Chronic | ICD-10-CM

## 2024-02-15 DIAGNOSIS — K02.62 DENTAL CARIES ON SMOOTH SURFACE PENETRATING INTO DENTIN: ICD-10-CM

## 2024-02-15 DIAGNOSIS — K05.6 PERIODONTAL DISEASE, UNSPECIFIED: ICD-10-CM

## 2024-02-15 DIAGNOSIS — K02.9 DENTAL CARIES, UNSPECIFIED: ICD-10-CM

## 2024-02-15 DIAGNOSIS — Q21.1 ATRIAL SEPTAL DEFECT: Chronic | ICD-10-CM

## 2024-02-15 PROCEDURE — G0463: CPT

## 2024-02-15 RX ORDER — PREGABALIN 225 MG/1
0 CAPSULE ORAL
Qty: 0 | Refills: 0 | DISCHARGE

## 2024-02-15 RX ORDER — LEVOTHYROXINE SODIUM 125 MCG
325 TABLET ORAL
Qty: 0 | Refills: 0 | DISCHARGE

## 2024-02-15 RX ORDER — LIDOCAINE HCL 20 MG/ML
0.2 VIAL (ML) INJECTION ONCE
Refills: 0 | Status: DISCONTINUED | OUTPATIENT
Start: 2024-03-06 | End: 2024-03-20

## 2024-02-15 RX ORDER — BETA-CAROTENE 7500 MCG
0 CAPSULE ORAL
Qty: 0 | Refills: 0 | DISCHARGE

## 2024-02-15 NOTE — H&P PST ADULT - NSICDXPASTMEDICALHX_GEN_ALL_CORE_FT
PAST MEDICAL HISTORY:  Acquired subluxation of left patella, sequela     Acute depression     Afferent loop syndrome     Anxiety     ASD (atrial septal defect)     Celiac disease     Cyst of ovary, unspecified laterality     Down syndrome     Duodenitis     Gastritis     Gilbert's syndrome     Gluten free diet     History of pseudoseizure     Hypothyroid     Iron deficiency anemia, unspecified iron deficiency anemia type     Lactose intolerance     Macrocytic anemia     MRSA (methicillin resistant Staphylococcus aureus) MSSA/MRSA detected in Nose culture    Obsessive compulsive disorder     Sleep disturbance     Syncope, unspecified syncope type      PAST MEDICAL HISTORY:  Acquired subluxation of left patella, sequela     Acute depression     Afferent loop syndrome     Anxiety     ASD (atrial septal defect)     Celiac disease     Cyst of ovary, unspecified laterality     Down syndrome     Duodenitis     Fatty liver     Gastritis     Gilbert's syndrome     Gluten free diet     History of pseudoseizure     Hypothyroid     Iron deficiency anemia, unspecified iron deficiency anemia type     Lactose intolerance     Macrocytic anemia     MRSA (methicillin resistant Staphylococcus aureus) MSSA/MRSA detected in Nose culture    Obsessive compulsive disorder     Sleep disturbance     Syncope, unspecified syncope type

## 2024-02-15 NOTE — H&P PST ADULT - OTHER CARE PROVIDERS
Jovanni Rodgers Md Cardiologist [phone] (October 2023 last seen) Zurdo Carr (endocrinologist) [phone]

## 2024-02-15 NOTE — H&P PST ADULT - PROBLEM SELECTOR PLAN 1
Comprehensive dental treatment under anesthesia  Instructions reviewed with aide and provided to aide   recent blood work performed at group home, will obtain for chart  UCG DOS - specimen cup provided   Instructed aide patient will take morning medications (listed on instructions) with small sip of water, no applesauce

## 2024-02-15 NOTE — H&P PST ADULT - NSICDXPASTSURGICALHX_GEN_ALL_CORE_FT
PAST SURGICAL HISTORY:  ASD (atrial septal defect) ASD repair Oct 2001    H/O hernia repair 2002, 2009 with mesh    History of colon surgery duodeno [id number]    S/P hip replacement, right     S/P tonsillectomy

## 2024-02-15 NOTE — H&P PST ADULT - HISTORY OF PRESENT ILLNESS
48 year old female presents via wheelchair with group home aide (Dara) prior to comprehensive dental treatment under anesthesia on 3/6/24 with Dr. Quiroga. PMHx of hypothyroidism, Down Syndrome, Duodenitis/Gastritis, Gilbert's syndrome, Hypothyroid, Fe deficiency anemia, macrocytic anemia, Ovarian cyst, ASD s/p repair, Anxiety, Celiac disease, lactose intolerance, patella subluxation, OCD, pseudo seizures (not on medications), afferent limb syndrome, anxiety, depression. Patient is able to take medications with water and consumes food without assistance.     Decision Maker / HCP -  Aubrey Quintanilla (Father) [phone] will be present on day of surgery with patient.  48 year old female presents via wheelchair with group home aide (Dara) prior to comprehensive dental treatment under anesthesia on 3/6/24 with Dr. Quiroga. PMHx of hypothyroidism, Down Syndrome, Duodenitis/Gastritis, Gilbert's syndrome, Hypothyroid, Fe deficiency anemia, macrocytic anemia, Ovarian cyst, ASD s/p repair, Anxiety, Celiac disease, lactose intolerance, patella subluxation, OCD, fatty liver, pseudo seizures (not on medications), afferent limb syndrome, anxiety, depression. Patient is able to take medications with water and consumes food without assistance.     Decision Maker / HCP -  Aubrey Dwight (Father) [phone] will be present on day of surgery with patient.

## 2024-02-15 NOTE — H&P PST ADULT - ASSESSMENT
Dental: unable to assess patient's teeth     Activity : walks on her own per aide, light work in her room  dasi mets : 4.64

## 2024-02-22 ENCOUNTER — APPOINTMENT (OUTPATIENT)
Dept: GASTROENTEROLOGY | Facility: CLINIC | Age: 49
End: 2024-02-22
Payer: MEDICARE

## 2024-02-22 ENCOUNTER — LABORATORY RESULT (OUTPATIENT)
Age: 49
End: 2024-02-22

## 2024-02-22 VITALS
HEART RATE: 82 BPM | OXYGEN SATURATION: 99 % | WEIGHT: 145 LBS | BODY MASS INDEX: 26.68 KG/M2 | HEIGHT: 62 IN | SYSTOLIC BLOOD PRESSURE: 124 MMHG | DIASTOLIC BLOOD PRESSURE: 72 MMHG | TEMPERATURE: 97.3 F

## 2024-02-22 DIAGNOSIS — K90.0 CELIAC DISEASE: ICD-10-CM

## 2024-02-22 PROCEDURE — 99214 OFFICE O/P EST MOD 30 MIN: CPT

## 2024-02-22 PROCEDURE — 36415 COLL VENOUS BLD VENIPUNCTURE: CPT

## 2024-02-22 RX ORDER — SUCRALFATE 1 G/1
TABLET ORAL
Refills: 0 | Status: ACTIVE | COMMUNITY

## 2024-02-22 RX ORDER — ASCORBIC ACID 125 MG
TABLET,CHEWABLE ORAL
Refills: 0 | Status: ACTIVE | COMMUNITY

## 2024-02-22 RX ORDER — MINOXIDIL 2.5 MG/1
2.5 TABLET ORAL
Refills: 0 | Status: ACTIVE | COMMUNITY

## 2024-02-22 NOTE — ASSESSMENT
[FreeTextEntry1] : Patient with Down syndrome and celiac disease.  Significant efforts appear to be made to maintain a strict gluten-free diet.  She is feeling well.  She had abnormal celiac serologies back in August.  Bloodwork was sent for CBC, chem-pack, TSH, celiac markers, iron studies, vitamin B12, folate, vitamin A, vitamin D, vitamin K, magnesium, carotene, vitamin B6, zinc, copper, vitamin B1, vitamin B2, selenium, vitamin B3, vitamin B5, vitamin C.  Patient was sent for a bone density scan.  She recently fractured bones in her foot.  The patient undergoes endoscopy every 6 months by Dr. Elliott Wodos.  No small bowel biopsies were taken.  I have asked the patient's father to make sure that Dr. Woods takes biopsies to evaluate the small bowel for celiac disease findings.  The patient is seen by endocrinology and thyroid medication has been adjusted.  We discussed the possibility of a screening colonoscopy.  We will consider this at the next visit.   Plan from 8/8/2023 - Patient with Down syndrome and celiac disease who had normalization of her celiac serologies in January. She had mildly elevated liver tests. She is doing well although there is some question as to whether or not she is getting gluten exposure in her group home. The patient appears to be feeling well.  Bloodwork was sent for CBC, chem-pack, TSH, celiac markers, iron studies, vitamin B12, folate, vitamin A, vitamin D, vitamin K, magnesium, carotene, vitamin B6, zinc, copper, vitamin B1, vitamin B2, selenium, vitamin B3, vitamin B5, vitamin C.  If the liver tests are still abnormal, I will send the patient for an abdominal ultrasound. We would also refer the patient for hepatology evaluation.  I have asked the patient's father to ask Dr. Peng to take multiple biopsies from the small bowel during the patient's next endoscopy in order to assess the patient's celiac disease. I am also sending this letter to Dr. Peng to convey this request directly.  The patient must remain on a strict gluten-free diet free of contact and cross-contamination.   Plan from 6/10/2022 - Patient with Down syndrome and celiac disease who had mildly abnormal celiac serologies last summer. She is status post hospitalization for GI bleed with gastric and duodenal ulcers. She has a complicated history with multiple gastric surgeries and dilatations of strictures. She had an elevated alkaline phosphatase last summer. After the last visit, my dietitian and I made significant efforts to assure that the patient was provided with a gluten-free environment at her group home.  Bloodwork was sent for CBC, chem-pack, TSH, celiac markers, iron studies, B12, folate, vitamin A, vitamin D, vitamin K, magnesium, carotene, vitamin B6, zinc, copper, vitamin B1, vitamin B2. PT, alpha-1 antitrypsin, alpha-fetoprotein, KAVON, ANCA, ceruloplasmin, iron studies, GGTP, celiac markers, hepatitis A., B., C. serologies, lipid profile, AMA, anti-smooth muscle antibody, anti-LKM antibody, anti-soluble liver antigen antibody, and alkaline phosphatase isoenzymes.  I am deferring endoscopic evaluations to Dr. Peng. I have asked the patient to get me any biopsy results from the previous endoscopies and to ask that multiple small bowel biopsies be taken at the time of future endoscopy.   Plan from 8/11/2021 - Patient with Down syndrome and a diagnosis of celiac disease made 15 years ago. Recent pathology showed moderate villous blunting with slightly increased intraepithelial lymphocytes and increased inflammatory cells in the lamina propria. Although efforts are made for the patient to be on a strict gluten-free diet, there does appear to be significant risk of contact and cross-contamination at the patient's group home.She has had multiple gastric surgeries and repeated dilatations of a gastrojejunostomy stricture.  I had a long discussion with the patient and her father regarding celiac disease, the gluten-free diet, the concepts of contact and cross contamination, and the potential complications of celiac disease. We also discussed the need for ongoing followup.  Bloodwork was sent for CBC, chem-pack, TSH, celiac markers, iron studies, B12, folate, vitamin A, vitamin D, vitamin K, magnesium, carotene, vitamin B6, zinc, and celiac HLA testing.  Patient was sent for a bone density scan.  I have provided the patient with Zegerid 20 mg twice daily and Carafate 10 cc 4 times daily given the recent finding of a small ulcer at the surgical anastomosis.  I explained to the patient that I do not have expertise when it comes to dilatation and stenting or advanced endoscopy. I deferred management decisions and intervention regarding this to Dr. Guadarrama.

## 2024-02-22 NOTE — PHYSICAL EXAM
[General Appearance - Alert] : alert [General Appearance - In No Acute Distress] : in no acute distress [Neck Appearance] : the appearance of the neck was normal [Neck Cervical Mass (___cm)] : no neck mass was observed [Jugular Venous Distention Increased] : there was no jugular-venous distention [Thyroid Diffuse Enlargement] : the thyroid was not enlarged [Thyroid Nodule] : there were no palpable thyroid nodules [Auscultation Breath Sounds / Voice Sounds] : lungs were clear to auscultation bilaterally [Heart Rate And Rhythm] : heart rate was normal and rhythm regular [Heart Sounds] : normal S1 and S2 [Heart Sounds Gallop] : no gallops [Murmurs] : no murmurs [Heart Sounds Pericardial Friction Rub] : no pericardial rub [Edema] : there was no peripheral edema [Bowel Sounds] : normal bowel sounds [Abdomen Soft] : soft [Abdomen Tenderness] : non-tender [Abdomen Mass (___ Cm)] : no abdominal mass palpated [] : no hepato-splenomegaly [No CVA Tenderness] : no ~M costovertebral angle tenderness [No Spinal Tenderness] : no spinal tenderness [Oriented To Time, Place, And Person] : oriented to person, place, and time [Impaired Insight] : insight and judgment were intact [Affect] : the affect was normal

## 2024-02-22 NOTE — CONSULT LETTER
[FreeTextEntry1] : Dear Dr. Sue Aguilar and Dr. Elliott Peng,\par  \par  I had the pleasure of seeing your patient RICARDO FORTUNE in the office today.  My office note is attached. PLEASE READ THE "ASSESSMENT" SECTION OF THE NOTE TO SEE MY IMPRESSION AND PLAN.\par  \par  Thank you very much for allowing me to participate in the care of your patient.\par  \par  Sincerely,\par  \par  Robin Colorado M.D., FACG, FACP\par  Director, Celiac Program at [facility]\par   of Medicine, [employer] at [facility] [place]  Adjunct  of Medicine, [facility]  Practice Director, [facility] - Gastroenterology at [place]\[facility]  [address]\[place] [address]\par  Tel: [phone]\par  Email: [email or web address]  \par  \par  The attached note has been created using a voice recognition system (Dragon).  There may be some misspellings and typos.  Please call my office if you have any issues or questions.

## 2024-02-22 NOTE — HISTORY OF PRESENT ILLNESS
[FreeTextEntry1] : The patient was seen with the patient's father and the  from the group home.  The patient has celiac disease and Down syndrome.  We reviewed the evaluations done since her last visit on August 8, 2023.  Blood work from that day showed positive tissue transglutaminase IgA of 10.3 and positive deamidated gliadin antibody IgA of 103.7.  Alkaline phosphatase was elevated at 159.  Copper was low at 28 and zinc was at the low end of normal at 47.  TSH was less than 0.01 with a free T4 of 2.1, which is elevated.  She has been on copper 2 mg a day and zinc 50 mg a day.  She was seen by the liver team.  Abdominal ultrasound on September 1, 2023 showed fatty liver but FibroScan performed on October 16, 2023 showed S0, F0 to F1.  The patient had an EGD performed by Dr. Elliott Woods on January 26, 2024 but no biopsies were taken.  It showed that the stent was patent and in good position.  No stent exchange was needed.  The father and  appear to be very vigilant regarding the gluten-free diet.  The patient has been doing well.  She denies abdominal pain, nausea, vomiting.  Appetite is good.  There is no dysphagia.  Bowel movements are normal without melena or bright red blood per rectum.  The patient has gained weight.   Note from 8/8/2023 - The patient was seen with the patient's father, who provided most of the information.  The patient has Down syndrome and celiac disease.  On her last blood work performed on January 25, 2023, celiac labs had normalized for the first time along with normalization of multiple vitamin deficiencies including copper, vitamin K, and vitamin A.  At that time ALT and alk phos were mildly elevated at 49 and 131 respectively.  The patient has been followed by Dr. Peng at [facility] for her gastric outlet obstruction.  She has had multiple endoscopies since I last saw the patient on Leelee 10, 2022.  The last EGD and EUS was performed on July 11, 2023 at which time a metal stent was placed across the gastrojejunostomy.  She also had endoscopies on July 11, 2022, December 7, 2022, and April 14, 2023.  It is not clear if small bowel biopsies were taken on any of these endoscopies.  The patient lives in a group home.  80% of her food is prepared by her father and is strictly gluten-free.  There is some question in the father's mind as to whether the facility is is careful regarding gluten exposure as they should be.  The patient has no obvious pain and denies vomiting.  She has daily solid bowel movements.  She denies melena or bright red blood per rectum.  The patient has gained 28 pounds.   Note from 6/10/2022 - The patient has Down syndrome and celiac disease.  She lives in a group home where she is reportedly on a gluten-free diet.  She is seeing me for the first time since August 11, 2021.  At that time, blood work revealed abnormal celiac markers with tissue transglutaminase IgA weakly positive at 9.1 with normal IgG and positive anti-deamidated gliadin IgA of 71.7.  The patient had borderline iron deficiency anemia, elevated alkaline phosphatase, decreased vitamin D, B12, and carotene levels.  After this visit, the patient saw my dietitian and efforts were made to ensure strict gluten-free diet at her group home.  Since then, the patient was hospitalized at [facility] from April 26 to May 2 with hematemesis and abdominal pain.  She had 3 endoscopies by Dr. Elliott Peng and received blood transfusions.  She was found to have gastric and duodenal ulcers as well as stenosis of the gastroenterostomy in the gastric body which was dilated to 18 mm.  There appeared to be a stenosis of the jejunal jejunal anastomosis.  It is unclear if small bowel biopsies were taken during these procedures.  The patient is currently on omeprazole 40 mg twice daily and Carafate twice daily as well as B12, vitamin D, and beta-carotene supplementation.  She gets pain in the abdomen bilaterally but does not vomit.  She has 4 solid bowel movements a day without melena.  She had a small amount of blood on 1 occasion which was likely related to hemorrhoids.   Note from 8/11/2021 - The patient is a 45-year-old woman with Down syndrome who was seen with her father.  She was diagnosed with celiac disease 15 years ago by Dr. Noel.  I do not have the initial studies at which time the diagnosis was made.  The patient had a duodenal stricture which required gastrojejunostomy in 1997.  This has been revised on 2 occasions the patient has had recurrent stricturing at the gastrojejunal anastomosis for which she has undergone revealed dilatations approximately every 3 months.  She has seen multiple gastroenterologists for this.  Most recently, she had a stent placed by Dr. March in March but this was removed on April 30, 2021 due to diarrhea.  At the time of that endoscopy, small bowel biopsies were taken which showed moderate villous blunting with increased inflammatory cells in the lamina propria and slightly increased intraepithelial lymphocytes.  The patient has subsequently been seen by Dr. Guadarrama at [facility], who performed an enteroscopy on July 8, 2021 with dilatation.  At that time note was made of scalloped and atrophic appearing villi in the small bowel and an area of ulceration in the gastric body.  The patient was hospitalized on July 14, 2021 for hematemesis.  Another EGD was performed on July 15, 2021 which showed a 3 mm clean-based ulceration in the beginning of the gastrojejunostomy.  The patient is currently on Zegerid 20 mg twice daily.  She was on Carafate but this has been stopped. Dr. Guadarrama has recommended endoscopic gastrojejunostomy as an alternative to recurrent dilatations.  As far as the celiac disease goes, no blood work has been done in some time.  The patient lives in a group home where they are aware of the need for a gluten-free diet but it does appear that there is significant room for contact and cross-contamination.  The patient spends a good deal of time at her father's house, where he reports a strict gluten-free diet.  The patient's mother has passed away.  She complains of right sided abdominal pain.  She has recently gained weight and is not currently vomiting although she vomits frequently as she develops obstruction.  The patient has 4 solid bowel movements a day.  She denies melena or bright red blood per rectum.

## 2024-02-26 PROBLEM — K76.0 FATTY (CHANGE OF) LIVER, NOT ELSEWHERE CLASSIFIED: Chronic | Status: ACTIVE | Noted: 2024-02-15

## 2024-03-05 ENCOUNTER — TRANSCRIPTION ENCOUNTER (OUTPATIENT)
Age: 49
End: 2024-03-05

## 2024-03-06 ENCOUNTER — TRANSCRIPTION ENCOUNTER (OUTPATIENT)
Age: 49
End: 2024-03-06

## 2024-03-06 ENCOUNTER — OUTPATIENT (OUTPATIENT)
Dept: OUTPATIENT SERVICES | Facility: HOSPITAL | Age: 49
LOS: 1 days | End: 2024-03-06
Payer: MEDICARE

## 2024-03-06 VITALS
HEART RATE: 72 BPM | DIASTOLIC BLOOD PRESSURE: 57 MMHG | OXYGEN SATURATION: 98 % | SYSTOLIC BLOOD PRESSURE: 100 MMHG | RESPIRATION RATE: 14 BRPM

## 2024-03-06 VITALS
HEIGHT: 62.6 IN | WEIGHT: 149.03 LBS | HEART RATE: 77 BPM | SYSTOLIC BLOOD PRESSURE: 107 MMHG | TEMPERATURE: 98 F | OXYGEN SATURATION: 99 % | RESPIRATION RATE: 16 BRPM | DIASTOLIC BLOOD PRESSURE: 73 MMHG

## 2024-03-06 DIAGNOSIS — Z96.641 PRESENCE OF RIGHT ARTIFICIAL HIP JOINT: Chronic | ICD-10-CM

## 2024-03-06 DIAGNOSIS — Z98.89 OTHER SPECIFIED POSTPROCEDURAL STATES: Chronic | ICD-10-CM

## 2024-03-06 DIAGNOSIS — Q21.1 ATRIAL SEPTAL DEFECT: Chronic | ICD-10-CM

## 2024-03-06 DIAGNOSIS — Z01.818 ENCOUNTER FOR OTHER PREPROCEDURAL EXAMINATION: ICD-10-CM

## 2024-03-06 DIAGNOSIS — K05.6 PERIODONTAL DISEASE, UNSPECIFIED: ICD-10-CM

## 2024-03-06 DIAGNOSIS — K02.62 DENTAL CARIES ON SMOOTH SURFACE PENETRATING INTO DENTIN: ICD-10-CM

## 2024-03-06 LAB — HCG UR QL: NEGATIVE — SIGNIFICANT CHANGE UP

## 2024-03-06 PROCEDURE — D2391: CPT

## 2024-03-06 PROCEDURE — D4211: CPT

## 2024-03-06 PROCEDURE — D4341: CPT

## 2024-03-06 PROCEDURE — 81025 URINE PREGNANCY TEST: CPT

## 2024-03-06 PROCEDURE — D1208: CPT

## 2024-03-06 PROCEDURE — C1889: CPT

## 2024-03-06 PROCEDURE — D7140: CPT

## 2024-03-06 DEVICE — SURGICEL 2 X 14": Type: IMPLANTABLE DEVICE | Status: FUNCTIONAL

## 2024-03-06 RX ORDER — LEVOTHYROXINE SODIUM 125 MCG
1 TABLET ORAL
Refills: 0 | DISCHARGE

## 2024-03-06 RX ORDER — SACCHAROMYCES BOULARDII 250 MG
1 POWDER IN PACKET (EA) ORAL
Refills: 0 | DISCHARGE

## 2024-03-06 RX ORDER — CHOLECALCIFEROL (VITAMIN D3) 125 MCG
1 CAPSULE ORAL
Refills: 0 | DISCHARGE

## 2024-03-06 RX ORDER — COPPER 313.4 MG/1
1 INTRAUTERINE DEVICE INTRAUTERINE
Qty: 0 | Refills: 0 | DISCHARGE

## 2024-03-06 RX ORDER — ZINC GLUCONATE 30 MG
0 TABLET ORAL
Qty: 0 | Refills: 0 | DISCHARGE

## 2024-03-06 RX ORDER — DORZOLAMIDE HYDROCHLORIDE 20 MG/ML
1 SOLUTION/ DROPS OPHTHALMIC
Refills: 0 | DISCHARGE

## 2024-03-06 RX ORDER — MINOXIDIL 10 MG
1 TABLET ORAL
Refills: 0 | DISCHARGE

## 2024-03-06 RX ORDER — SODIUM CHLORIDE 0.65 %
0 AEROSOL, SPRAY (ML) NASAL
Refills: 0 | DISCHARGE

## 2024-03-06 RX ORDER — MIDODRINE HYDROCHLORIDE 2.5 MG/1
10 TABLET ORAL ONCE
Refills: 0 | Status: COMPLETED | OUTPATIENT
Start: 2024-03-06 | End: 2024-03-06

## 2024-03-06 RX ORDER — MIDODRINE HYDROCHLORIDE 2.5 MG/1
1 TABLET ORAL
Qty: 0 | Refills: 0 | DISCHARGE

## 2024-03-06 RX ORDER — SODIUM CHLORIDE 9 MG/ML
3 INJECTION INTRAMUSCULAR; INTRAVENOUS; SUBCUTANEOUS EVERY 8 HOURS
Refills: 0 | Status: DISCONTINUED | OUTPATIENT
Start: 2024-03-06 | End: 2024-03-06

## 2024-03-06 RX ORDER — B-COMPLEX WITH VITAMIN C
1 CAPSULE ORAL
Refills: 0 | DISCHARGE

## 2024-03-06 RX ORDER — ASCORBIC ACID 60 MG
1 TABLET,CHEWABLE ORAL
Qty: 0 | Refills: 0 | DISCHARGE

## 2024-03-06 RX ORDER — OMEPRAZOLE 10 MG/1
1 CAPSULE, DELAYED RELEASE ORAL
Qty: 0 | Refills: 0 | DISCHARGE

## 2024-03-06 RX ORDER — OLOPATADINE HYDROCHLORIDE 1 MG/ML
1 SOLUTION/ DROPS OPHTHALMIC
Refills: 0 | DISCHARGE

## 2024-03-06 RX ORDER — IBUPROFEN 200 MG
1 TABLET ORAL
Refills: 0 | DISCHARGE

## 2024-03-06 RX ADMIN — MIDODRINE HYDROCHLORIDE 10 MILLIGRAM(S): 2.5 TABLET ORAL at 12:50

## 2024-03-06 NOTE — PRE-ANESTHESIA EVALUATION ADULT - NSANTHPMHFT_GEN_ALL_CORE
48 year old female presents via wheelchair with group home aide (Dara) prior to comprehensive dental treatment under anesthesia on 3/6/24 with Dr. Quiroga. PMHx of hypothyroidism, Down Syndrome, Duodenitis/Gastritis, Gilbert's syndrome, Hypothyroid, Fe deficiency anemia, macrocytic anemia, Ovarian cyst, ASD s/p repair, Anxiety, Celiac disease, lactose intolerance, patella subluxation, OCD, fatty liver, pseudo seizures (not on medications), afferent limb syndrome, anxiety, depression.    METS>4; deneis CP/SOB, N/V.  denies neck pain or pathology. endorses full neck ROM

## 2024-03-06 NOTE — ASU PREOP CHECKLIST - SIDE RAILS UP
Medicare Wellness Visit patient outreach outcome:  Attempted outreach and message left     Name: MIGUEL ANGEL LONGORIA    ### Patient Details  YOB: 1947  MRN: [id number]    ### Encounter Details  Arrival Date: N/A  Discharge Date: N/A  Encounter ID: [id number]    ### Related interaction  [facility] Annual Wellness Outreach (Annual Wellness Outreach ([place])) ([email or web address])    ### Questions     Question 1   Annual Wellness   If you would like to speak with someone now to schedule your wellness visit, press 1; if you would like us to call you back later to schedule your wellness visit, press 2; if you would prefer not to schedule an appointment at this time, please press 3, or if you've already scheduled a wellness visit this year press 4.   Callback Requested (Issue Panel: Annual Wellness Visit Issue, Issue Alert: Wellness Intervention)    ### Required Interventions and Feedback     Call Status         Call Status List:     Voicemail Left/1st Attempt (edited by TINO on 07/08/2022 11:48 AM CDT)   done

## 2024-03-06 NOTE — ASU PATIENT PROFILE, ADULT - NS PRO ABUSE SCREEN AFRAID ANYONE YN
Hey as a heads up this patient has tried to see you a few times and got confused about the times so no showed twice. I originally referred for left sided low back pain and sciatica but she also needs a right knee RFA if you think you can cover both topics.  Thanks Gabriel  no

## 2024-03-06 NOTE — ASU DISCHARGE PLAN (ADULT/PEDIATRIC) - NS MD DC FALL RISK RISK
For information on Fall & Injury Prevention, visit: [email or web address] OR  [email or web address] OR  https://www.cdc.gov/steadi/patient.html

## 2024-03-06 NOTE — PRE-ANESTHESIA EVALUATION ADULT - NSANTHADDINFOFT_GEN_ALL_CORE
cards eval: 2/20/24 no cardiac contraindication to surgery. mild residual MR. normal LV function ( Dr Baig)

## 2024-03-06 NOTE — ASU PREOP CHECKLIST - ISOLATION PRECAUTIONS
"Admit 2/1  Busy at 415pm     Reason for Disposition   [1] Fever > 100.5 F (38.1 C) AND [2] surgery in the last month    Answer Assessment - Initial Assessment Questions  1. TEMPERATURE: "What is the most recent temperature?"  "How was it measured?"     Low grade fever off and on x 3 weeks. Past 2-3 days having fever. T100.1  2. ONSET: "When did the fever start?"     Qod feverish. T100.7 max yest   3. SYMPTOMS: "Do you have any other symptoms besides the fever?"  (e.g., colds, headache, sore throat, earache, cough, rash, diarrhea, vomiting, abdominal pain)    No , just had procedures for pl effusion. Some SOB at rest. At baseline.   4. CAUSE: If there are no symptoms, ask: "What do you think is causing the fever?"      Procedure   5. CONTACTS: "Does anyone else in the family have an infection?"    No   6. TREATMENT: "What have you done so far to treat this fever?" (e.g., medications)    advil   7. IMMUNOCOMPROMISE: "Do you have of the following: diabetes, HIV positive, splenectomy, cancer chemotherapy, chronic steroid treatment, transplant patient, etc."    DM ? Years ago.   9. TRAVEL: "Have you traveled out of the country in the last month?" (e.g., travel history, exposures)     No    Protocols used: ST FEVER-A-AH  no redness around puncture site. eating and drinking normally today. Spoke with dr gruber if fever continues to be elevated pt feeling worse come into ER. Call pulm clinic in am. Pt notified. Call back with questions.   "
none

## 2024-03-06 NOTE — ASU DISCHARGE PLAN (ADULT/PEDIATRIC) - ASU DC SPECIAL INSTRUCTIONSFT
comprehensive dental treatment under general anesthesia     Motrin or tylenol  for the next two days for comfort     one molar extracted to  the lower left quadrant and one baby teeth to the upper right side  patient to have ice twenty minutes on and off and keep head elevated for the next two days and nights or patient will get swollen     no straw for Two days and keep head elevated for the next two days and nights     periodontal and restorative tx performed    see Dr Quiroga on Monday 3/12 at [facility] at 1 pm

## 2024-03-07 LAB
25(OH)D3 SERPL-MCNC: 55.4 NG/ML
ALBUMIN SERPL ELPH-MCNC: 3.9 G/DL
ALP BLD-CCNC: 188 U/L
ALP BLD-CCNC: 194 IU/L
ALP BONE CFR SERPL: 34 %
ALP INTEST CFR SERPL: 16 %
ALP LIVER CFR SERPL: 50 %
ALT SERPL-CCNC: 33 U/L
ANION GAP SERPL CALC-SCNC: 11 MMOL/L
AST SERPL-CCNC: 32 U/L
BASOPHILS # BLD AUTO: 0.06 K/UL
BASOPHILS NFR BLD AUTO: 1.3 %
BETA CAROTENE: 3 UG/DL
BILIRUB SERPL-MCNC: 0.7 MG/DL
BUN SERPL-MCNC: 16 MG/DL
CALCIUM SERPL-MCNC: 8.7 MG/DL
CHLORIDE SERPL-SCNC: 101 MMOL/L
CO2 SERPL-SCNC: 26 MMOL/L
COPPER SERPL-MCNC: 101 UG/DL
CREAT SERPL-MCNC: 0.64 MG/DL
EGFR: 109 ML/MIN/1.73M2
ENDOMYSIUM IGA SER QL: NEGATIVE
ENDOMYSIUM IGA TITR SER: NORMAL
EOSINOPHIL # BLD AUTO: 0.1 K/UL
EOSINOPHIL NFR BLD AUTO: 2.2 %
FERRITIN SERPL-MCNC: 36 NG/ML
FOLATE SERPL-MCNC: >20 NG/ML
GGT SERPL-CCNC: 10 U/L
GLIADIN IGA SER QL: 46.8 UNITS
GLIADIN IGG SER QL: 8.8 UNITS
GLIADIN PEPTIDE IGA SER-ACNC: POSITIVE
GLIADIN PEPTIDE IGG SER-ACNC: NEGATIVE
GLUCOSE SERPL-MCNC: 126 MG/DL
HCT VFR BLD CALC: 41.5 %
HGB BLD-MCNC: 13.6 G/DL
IGA SER QL IEP: 576 MG/DL
IMM GRANULOCYTES NFR BLD AUTO: 0.2 %
IRON SATN MFR SERPL: 9 %
IRON SERPL-MCNC: 36 UG/DL
LYMPHOCYTES # BLD AUTO: 1.01 K/UL
LYMPHOCYTES NFR BLD AUTO: 21.8 %
MAGNESIUM SERPL-MCNC: 2.1 MG/DL
MAN DIFF?: NORMAL
MCHC RBC-ENTMCNC: 32.8 GM/DL
MCHC RBC-ENTMCNC: 33.4 PG
MCV RBC AUTO: 102 FL
MENADIONE SERPL-MCNC: 0.45 NG/ML
MONOCYTES # BLD AUTO: 0.46 K/UL
MONOCYTES NFR BLD AUTO: 9.9 %
NEUTROPHILS # BLD AUTO: 3 K/UL
NEUTROPHILS NFR BLD AUTO: 64.6 %
NICOTINAMIDE: 167.8 NG/ML
NICOTINIC ACID: <5 NG/ML
PANTOTHENATE SERPLBLD-MCNC: 531.5 NG/ML
PLATELET # BLD AUTO: 305 K/UL
POTASSIUM SERPL-SCNC: 4 MMOL/L
PROT SERPL-MCNC: 6.8 G/DL
RBC # BLD: 4.07 M/UL
RBC # FLD: 14.8 %
SELENIUM SERPL-MCNC: 140 UG/L
SODIUM SERPL-SCNC: 139 MMOL/L
TIBC SERPL-MCNC: 384 UG/DL
TSH SERPL-ACNC: 0.01 UIU/ML
TTG IGA SER IA-ACNC: 7.1 U/ML
TTG IGA SER-ACNC: ABNORMAL
TTG IGG SER IA-ACNC: 2.4 U/ML
TTG IGG SER IA-ACNC: NEGATIVE
UIBC SERPL-MCNC: 348 UG/DL
VIT A SERPL-MCNC: 41.5 UG/DL
VIT B1 SERPL-MCNC: 162.8 NMOL/L
VIT B12 SERPL-MCNC: 926 PG/ML
VIT B2 SERPL-MCNC: 312 UG/L
VIT B6 SERPL-MCNC: 54.2 UG/L
VIT C SERPL-MCNC: 1.3 MG/DL
WBC # FLD AUTO: 4.64 K/UL
ZINC SERPL-MCNC: 75 UG/DL

## 2024-03-14 ENCOUNTER — OUTPATIENT (OUTPATIENT)
Dept: OUTPATIENT SERVICES | Facility: HOSPITAL | Age: 49
LOS: 1 days | End: 2024-03-14
Payer: MEDICARE

## 2024-03-14 ENCOUNTER — APPOINTMENT (OUTPATIENT)
Dept: RADIOLOGY | Facility: CLINIC | Age: 49
End: 2024-03-14
Payer: MEDICARE

## 2024-03-14 DIAGNOSIS — Z98.89 OTHER SPECIFIED POSTPROCEDURAL STATES: Chronic | ICD-10-CM

## 2024-03-14 DIAGNOSIS — K90.0 CELIAC DISEASE: ICD-10-CM

## 2024-03-14 DIAGNOSIS — Q21.1 ATRIAL SEPTAL DEFECT: Chronic | ICD-10-CM

## 2024-03-14 DIAGNOSIS — Z96.641 PRESENCE OF RIGHT ARTIFICIAL HIP JOINT: Chronic | ICD-10-CM

## 2024-03-14 PROCEDURE — 77085 DXA BONE DENSITY AXL VRT FX: CPT

## 2024-03-14 PROCEDURE — 77085 DXA BONE DENSITY AXL VRT FX: CPT | Mod: 26

## 2024-03-23 NOTE — ASSESSMENT
[FreeTextEntry1] : Scleral redness, persistent\par Ophthalmology follow-up encouraged\par QuantiFERON-TB indeterminant -- repeat ordered\par There is no evidence of autoimmune disease at this time.  Mild elevation of rheumatoid factor is nonspecific.  CCP is negative.\par Follow-up as needed.\par \par Transcription was done with voice recognition software.  Be aware that errors may occur despite editing.  If confirmation of any statement in this dictation is needed for care of the patient please feel free to call me to discuss.\par \par \par My collective time spent on today's visit was greater than 30 minutes and included: Preparation for the visit, review of the medical records, review of pertinent diagnostic studies, examination and counseling of the patient on the above diagnosis, treatment plan and prognosis, orders of diagnostic test, medications and or appropriate procedures and documentation in the medical record of today's visit.\par \par \par  55.12

## 2024-05-06 NOTE — PATIENT PROFILE ADULT - IS PATIENT POST-MENOPAUSAL?
Patient: Gale Ya is a 46 y.o. female who presents today with the following Chief Complaint(s):  Chief Complaint   Patient presents with    Other     Left elbow pain- painful/itchy bump on left eyelid        Assessment:  Encounter Diagnoses   Name Primary?    Tendinitis of left elbow Yes    Chalazion left upper eyelid     Insomnia, unspecified type        Plan:  1. Tendinitis of left elbow  Treat with steroid pack- recommended rest and elbow brace. Follow up if no improvement.   - methylPREDNISolone (MEDROL, KALIA,) 4 MG tablet; Take 6 tablets all at once on day 1, 5 tablets on day 2, 4 tablets on day 3, 3 tablets on day 4 and 2 tablets on day 5 and 1 tablet on day 6.  Dispense: 21 tablet; Refill: 0    2. Chalazion left upper eyelid  Advised she needs warm moist compresses 3-4 times a day. Follow up if no improvement.     3. Insomnia, unspecified type  Start on Trazodone nightly. Follow up in 1 month.   - traZODone (DESYREL) 50 MG tablet; Take 1 tablet by mouth nightly  Dispense: 30 tablet; Refill: 1      HPI  Patient presents today with concerns of left elbow pain. She states it is a sharp pain when she extends her left arm. She states it causes pain when she tries to turn the steering wheel. She states the pain only last a few seconds. She has no known injury. She has tried motrion and voltaren gel with no improvement. Pain comes and goes.     Patient also has left eye lump on the upper eye lid. She states it started a couple of months ago. She has tried warm moist compresses on it may be once a week when it bothers her more. She states she has been washing her eye with baby wash.   She also has trouble sleeping. She has been trying OTC medications with no improvement.     Current Outpatient Medications   Medication Sig Dispense Refill    methylPREDNISolone (MEDROL, KALIA,) 4 MG tablet Take 6 tablets all at once on day 1, 5 tablets on day 2, 4 tablets on day 3, 3 tablets on day 4 and 2 tablets on day 5 and 1 
yes

## 2024-06-06 RX ORDER — SUCRALFATE 1 G/10ML
1 SUSPENSION ORAL 4 TIMES DAILY
Qty: 3600 | Refills: 2 | Status: ACTIVE | COMMUNITY
Start: 2021-08-11 | End: 1900-01-01

## 2024-08-28 ENCOUNTER — LABORATORY RESULT (OUTPATIENT)
Age: 49
End: 2024-08-28

## 2024-08-28 ENCOUNTER — APPOINTMENT (OUTPATIENT)
Dept: GASTROENTEROLOGY | Facility: CLINIC | Age: 49
End: 2024-08-28
Payer: MEDICARE

## 2024-08-28 VITALS
SYSTOLIC BLOOD PRESSURE: 110 MMHG | WEIGHT: 140 LBS | HEIGHT: 62 IN | BODY MASS INDEX: 25.76 KG/M2 | DIASTOLIC BLOOD PRESSURE: 80 MMHG | TEMPERATURE: 97.7 F

## 2024-08-28 DIAGNOSIS — R74.8 ABNORMAL LEVELS OF OTHER SERUM ENZYMES: ICD-10-CM

## 2024-08-28 DIAGNOSIS — K90.0 CELIAC DISEASE: ICD-10-CM

## 2024-08-28 PROCEDURE — 99215 OFFICE O/P EST HI 40 MIN: CPT

## 2024-08-28 RX ORDER — OMEPRAZOLE 40 MG/1
40 CAPSULE, DELAYED RELEASE ORAL
Refills: 0 | Status: DISCONTINUED | COMMUNITY
End: 2024-08-28

## 2024-08-28 RX ORDER — ACETAMINOPHEN 325 MG
TABLET ORAL
Refills: 0 | Status: ACTIVE | COMMUNITY

## 2024-08-28 NOTE — ASSESSMENT
[FreeTextEntry1] : Patient with Down syndrome and celiac disease.  She has evidence of osteoporosis on bone density scan.  She has an elevated alkaline phosphatase.  Her celiac serologies were still abnormal in February.  She also has gastric outlet obstruction for which a metal stent was placed in July.  Bloodwork was sent for CBC, chem-pack, TSH, celiac markers, iron studies, vitamin B12, folate, vitamin A, vitamin D, vitamin K, magnesium, carotene, vitamin B6, zinc, copper, vitamin B1, vitamin B2, selenium, vitamin B3, vitamin B5, vitamin C, alkaline phosphatase isoenzymes.  The patient was again advised to see a hepatologist regarding the chronically elevated alkaline phosphatase.  I again urged to remind Dr. Woods to please take biopsies from the small intestine during the next endoscopy.  We again discussed the importance of a strict gluten-free diet free of contact and cross-contamination.  Patient will return to see me in 6 months.   Plan from 2/22/2024 - Patient with Down syndrome and celiac disease.  Significant efforts appear to be made to maintain a strict gluten-free diet.  She is feeling well.  She had abnormal celiac serologies back in August.  Bloodwork was sent for CBC, chem-pack, TSH, celiac markers, iron studies, vitamin B12, folate, vitamin A, vitamin D, vitamin K, magnesium, carotene, vitamin B6, zinc, copper, vitamin B1, vitamin B2, selenium, vitamin B3, vitamin B5, vitamin C.  Patient was sent for a bone density scan.  She recently fractured bones in her foot.  The patient undergoes endoscopy every 6 months by Dr. Ellitot Woods.  No small bowel biopsies were taken.  I have asked the patient's father to make sure that Dr. Woods takes biopsies to evaluate the small bowel for celiac disease findings.  The patient is seen by endocrinology and thyroid medication has been adjusted.  We discussed the possibility of a screening colonoscopy.  We will consider this at the next visit.   Plan from 8/8/2023 - Patient with Down syndrome and celiac disease who had normalization of her celiac serologies in January. She had mildly elevated liver tests. She is doing well although there is some question as to whether or not she is getting gluten exposure in her group home. The patient appears to be feeling well.  Bloodwork was sent for CBC, chem-pack, TSH, celiac markers, iron studies, vitamin B12, folate, vitamin A, vitamin D, vitamin K, magnesium, carotene, vitamin B6, zinc, copper, vitamin B1, vitamin B2, selenium, vitamin B3, vitamin B5, vitamin C.  If the liver tests are still abnormal, I will send the patient for an abdominal ultrasound. We would also refer the patient for hepatology evaluation.  I have asked the patient's father to ask Dr. Peng to take multiple biopsies from the small bowel during the patient's next endoscopy in order to assess the patient's celiac disease. I am also sending this letter to Dr. Peng to convey this request directly.  The patient must remain on a strict gluten-free diet free of contact and cross-contamination.   Plan from 6/10/2022 - Patient with Down syndrome and celiac disease who had mildly abnormal celiac serologies last summer. She is status post hospitalization for GI bleed with gastric and duodenal ulcers. She has a complicated history with multiple gastric surgeries and dilatations of strictures. She had an elevated alkaline phosphatase last summer. After the last visit, my dietitian and I made significant efforts to assure that the patient was provided with a gluten-free environment at her group home.  Bloodwork was sent for CBC, chem-pack, TSH, celiac markers, iron studies, B12, folate, vitamin A, vitamin D, vitamin K, magnesium, carotene, vitamin B6, zinc, copper, vitamin B1, vitamin B2. PT, alpha-1 antitrypsin, alpha-fetoprotein, KAVON, ANCA, ceruloplasmin, iron studies, GGTP, celiac markers, hepatitis A., B., C. serologies, lipid profile, AMA, anti-smooth muscle antibody, anti-LKM antibody, anti-soluble liver antigen antibody, and alkaline phosphatase isoenzymes.  I am deferring endoscopic evaluations to Dr. Peng. I have asked the patient to get me any biopsy results from the previous endoscopies and to ask that multiple small bowel biopsies be taken at the time of future endoscopy.   Plan from 8/11/2021 - Patient with Down syndrome and a diagnosis of celiac disease made 15 years ago. Recent pathology showed moderate villous blunting with slightly increased intraepithelial lymphocytes and increased inflammatory cells in the lamina propria. Although efforts are made for the patient to be on a strict gluten-free diet, there does appear to be significant risk of contact and cross-contamination at the patient's group home.She has had multiple gastric surgeries and repeated dilatations of a gastrojejunostomy stricture.  I had a long discussion with the patient and her father regarding celiac disease, the gluten-free diet, the concepts of contact and cross contamination, and the potential complications of celiac disease. We also discussed the need for ongoing followup.  Bloodwork was sent for CBC, chem-pack, TSH, celiac markers, iron studies, B12, folate, vitamin A, vitamin D, vitamin K, magnesium, carotene, vitamin B6, zinc, and celiac HLA testing.  Patient was sent for a bone density scan.  I have provided the patient with Zegerid 20 mg twice daily and Carafate 10 cc 4 times daily given the recent finding of a small ulcer at the surgical anastomosis.  I explained to the patient that I do not have expertise when it comes to dilatation and stenting or advanced endoscopy. I deferred management decisions and intervention regarding this to Dr. Guadarrama.

## 2024-08-28 NOTE — CONSULT LETTER
[FreeTextEntry1] : Dear Dr. Sue Aguilar and Dr. Elliott Peng,\par  \par  I had the pleasure of seeing your patient RICARDO FORTUNE in the office today.  My office note is attached. PLEASE READ THE "ASSESSMENT" SECTION OF THE NOTE TO SEE MY IMPRESSION AND PLAN.\par  \par  Thank you very much for allowing me to participate in the care of your patient.\par  \par  Sincerely,\par  \par  Robin Colorado M.D., FACG, FACP\par  Director, Celiac Program at [facility]\par   of Medicine, [facility] at [facility] [place]/[facility]  Adjunct  of Medicine, [facility]  Practice Director, [facility] - Gastroenterology at [place]\[facility]  [address]\[place] [address]\par  Tel: [phone]\par  Email: [email or web address]\par  \par  \par  The attached note has been created using a voice recognition system (Dragon).  There may be some misspellings and typos.  Please call my office if you have any issues or questions.

## 2024-08-28 NOTE — CONSULT LETTER
[FreeTextEntry1] : Dear Dr. Sue Aguilar and Dr. Elliott Peng,\par  \par  I had the pleasure of seeing your patient RICARDO FORTUNE in the office today.  My office note is attached. PLEASE READ THE "ASSESSMENT" SECTION OF THE NOTE TO SEE MY IMPRESSION AND PLAN.\par  \par  Thank you very much for allowing me to participate in the care of your patient.\par  \par  Sincerely,\par  \par  Robin Colorado M.D., FACG, FACP\par  Director, Celiac Program at [facility]\par   of Medicine, [facility] [employer] at [facility]  Adjunct  of Medicine, [facility] of [facility]  Practice Director, [facility] - Gastroenterology at [place]\[facility]  [address]\[place] [address]\par  Tel: [phone]\par  Email: [email or web address]\par  \par  \par  The attached note has been created using a voice recognition system (Dragon).  There may be some misspellings and typos.  Please call my office if you have any issues or questions.

## 2024-08-28 NOTE — HISTORY OF PRESENT ILLNESS
[FreeTextEntry1] : The patient has Down syndrome and celiac disease and was seen with her father and the  from the group home.  We reviewed the events and evaluations since the patient's last visit on February 22, 2024.  Blood work at that time showed ongoing abnormalities in the celiac serologies although improved.  Tissue transglutaminase IgA was weakly positive at 7.1.  Deamidated gliadin antibody IgA was positive at 46.8.  Thyroid function was abnormal with a TSH of 0.01 and a free T4 of 2.1.  The patient has since been evaluated and had adjustments in her thyroid medication.  She had no anemia but an iron deficiency with a 9% saturation.  Alkaline phosphatase was elevated at 188.  A bone density scan performed on March 14, 2024 revealed osteoporosis.  This has been addressed and the patient is getting physical therapy.  She is followed by Dr. Elliott Woods for gastric outlet obstruction and had EGD with stenting on July 2, 2024.  Unfortunately, again no biopsies were taken from the small intestine during this procedure.  The patient is on omeprazole 40 mg twice daily and sucralfate 1 g 4 times daily.  She has belching which may be related to poor emptying from the stomach.  She denies vomiting.  The patient has 2-3 bowel movements in the morning and bowel movement later in the day.  Stools are soft but no diarrhea.  There is no melena or bright red blood per rectum.  The patient's father reports that the patient had a negative colonoscopy 1 to 2 years ago.   Note from 2/22/2024 - The patient was seen with the patient's father and the  from the group [place].  The patient has celiac disease and Down syndrome.  We reviewed the evaluations done since her last visit on August 8, 2023.  Blood work from that day showed positive tissue transglutaminase IgA of 10.3 and positive deamidated gliadin antibody IgA of 103.7.  Alkaline phosphatase was elevated at 159.  Copper was low at 28 and zinc was at the low end of normal at 47.  TSH was less than 0.01 with a free T4 of 2.1, which is elevated.  She has been on copper 2 mg a day and zinc 50 mg a day.  She was seen by the liver team.  Abdominal ultrasound on September 1, 2023 showed fatty liver but FibroScan performed on October 16, 2023 showed S0, F0 to F1.  The patient had an EGD performed by Dr. Elliott Woods on January 26, 2024 but no biopsies were taken.  It showed that the stent was patent and in good position.  No stent exchange was needed.  The father and  appear to be very vigilant regarding the gluten-free diet.  The patient has been doing well.  She denies abdominal pain, nausea, vomiting.  Appetite is good.  There is no dysphagia.  Bowel movements are normal without melena or bright red blood per rectum.  The patient has gained weight.   Note from 8/8/2023 - The patient was seen with the patient's father, who provided most of the information.  The patient has Down syndrome and celiac disease.  On her last blood work performed on January 25, 2023, celiac labs had normalized for the first time along with normalization of multiple vitamin deficiencies including copper, vitamin K, and vitamin A.  At that time ALT and alk phos were mildly elevated at 49 and 131 respectively.  The patient has been followed by Dr. Peng at [facility] for her gastric outlet obstruction.  She has had multiple endoscopies since I last saw the patient on Leelee 10, 2022.  The last EGD and EUS was performed on July 11, 2023 at which time a metal stent was placed across the gastrojejunostomy.  She also had endoscopies on July 11, 2022, December 7, 2022, and April 14, 2023.  It is not clear if small bowel biopsies were taken on any of these endoscopies.  The patient lives in a group home.  80% of her food is prepared by her father and is strictly gluten-free.  There is some question in the father's mind as to whether the facility is is careful regarding gluten exposure as they should be.  The patient has no obvious pain and denies vomiting.  She has daily solid bowel movements.  She denies melena or bright red blood per rectum.  The patient has gained 28 pounds.   Note from 6/10/2022 - The patient has Down syndrome and celiac disease.  She lives in a group home where she is reportedly on a gluten-free diet.  She is seeing me for the first time since August 11, 2021.  At that time, blood work revealed abnormal celiac markers with tissue transglutaminase IgA weakly positive at 9.1 with normal IgG and positive anti-deamidated gliadin IgA of 71.7.  The patient had borderline iron deficiency anemia, elevated alkaline phosphatase, decreased vitamin D, B12, and carotene levels.  After this visit, the patient saw my dietitian and efforts were made to ensure strict gluten-free diet at her group home.  Since then, the patient was hospitalized at [facility] from April 26 to May 2 with hematemesis and abdominal pain.  She had 3 endoscopies by Dr. Elliott Peng and received blood transfusions.  She was found to have gastric and duodenal ulcers as well as stenosis of the gastroenterostomy in the gastric body which was dilated to 18 mm.  There appeared to be a stenosis of the jejunal jejunal anastomosis.  It is unclear if small bowel biopsies were taken during these procedures.  The patient is currently on omeprazole 40 mg twice daily and Carafate twice daily as well as B12, vitamin D, and beta-carotene supplementation.  She gets pain in the abdomen bilaterally but does not vomit.  She has 4 solid bowel movements a day without melena.  She had a small amount of blood on 1 occasion which was likely related to hemorrhoids.   Note from 8/11/2021 - The patient is a 45-year-old woman with Down syndrome who was seen with her father.  She was diagnosed with celiac disease 15 years ago by Dr. Noel.  I do not have the initial studies at which time the diagnosis was made.  The patient had a duodenal stricture which required gastrojejunostomy in 1997.  This has been revised on 2 occasions the patient has had recurrent stricturing at the gastrojejunal anastomosis for which she has undergone revealed dilatations approximately every 3 months.  She has seen multiple gastroenterologists for this.  Most recently, she had a stent placed by Dr. March in March but this was removed on April 30, 2021 due to diarrhea.  At the time of that endoscopy, small bowel biopsies were taken which showed moderate villous blunting with increased inflammatory cells in the lamina propria and slightly increased intraepithelial lymphocytes.  The patient has subsequently been seen by Dr. Guadarrama at [facility], who performed an enteroscopy on July 8, 2021 with dilatation.  At that time note was made of scalloped and atrophic appearing villi in the small bowel and an area of ulceration in the gastric body.  The patient was hospitalized on July 14, 2021 for hematemesis.  Another EGD was performed on July 15, 2021 which showed a 3 mm clean-based ulceration in the beginning of the gastrojejunostomy.  The patient is currently on Zegerid 20 mg twice daily.  She was on Carafate but this has been stopped. Dr. Guadarrama has recommended endoscopic gastrojejunostomy as an alternative to recurrent dilatations.  As far as the celiac disease goes, no blood work has been done in some time.  The patient lives in a group home where they are aware of the need for a gluten-free diet but it does appear that there is significant room for contact and cross-contamination.  The patient spends a good deal of time at her father's house, where he reports a strict gluten-free diet.  The patient's mother has passed away.  She complains of right sided abdominal pain.  She has recently gained weight and is not currently vomiting although she vomits frequently as she develops obstruction.  The patient has 4 solid bowel movements a day.  She denies melena or bright red blood per rectum.

## 2024-08-28 NOTE — HISTORY OF PRESENT ILLNESS
[FreeTextEntry1] : The patient has Down syndrome and celiac disease and was seen with her father and the  from the group home.  We reviewed the events and evaluations since the patient's last visit on February 22, 2024.  Blood work at that time showed ongoing abnormalities in the celiac serologies although improved.  Tissue transglutaminase IgA was weakly positive at 7.1.  Deamidated gliadin antibody IgA was positive at 46.8.  Thyroid function was abnormal with a TSH of 0.01 and a free T4 of 2.1.  The patient has since been evaluated and had adjustments in her thyroid medication.  She had no anemia but an iron deficiency with a 9% saturation.  Alkaline phosphatase was elevated at 188.  A bone density scan performed on March 14, 2024 revealed osteoporosis.  This has been addressed and the patient is getting physical therapy.  She is followed by Dr. Elliott Woods for gastric outlet obstruction and had EGD with stenting on July 2, 2024.  Unfortunately, again no biopsies were taken from the small intestine during this procedure.  The patient is on omeprazole 40 mg twice daily and sucralfate 1 g 4 times daily.  She has belching which may be related to poor emptying from the stomach.  She denies vomiting.  The patient has 2-3 bowel movements in the morning and bowel movement later in the day.  Stools are soft but no diarrhea.  There is no melena or bright red blood per rectum.  The patient's father reports that the patient had a negative colonoscopy 1 to 2 years ago.   Note from 2/22/2024 - The patient was seen with the patient's father and the  from the group [place].  The patient has celiac disease and Down syndrome.  We reviewed the evaluations done since her last visit on August 8, 2023.  Blood work from that day showed positive tissue transglutaminase IgA of 10.3 and positive deamidated gliadin antibody IgA of 103.7.  Alkaline phosphatase was elevated at 159.  Copper was low at 28 and zinc was at the low end of normal at 47.  TSH was less than 0.01 with a free T4 of 2.1, which is elevated.  She has been on copper 2 mg a day and zinc 50 mg a day.  She was seen by the liver team.  Abdominal ultrasound on September 1, 2023 showed fatty liver but FibroScan performed on October 16, 2023 showed S0, F0 to F1.  The patient had an EGD performed by Dr. Elliott Woods on January 26, 2024 but no biopsies were taken.  It showed that the stent was patent and in good position.  No stent exchange was needed.  The father and  appear to be very vigilant regarding the gluten-free diet.  The patient has been doing well.  She denies abdominal pain, nausea, vomiting.  Appetite is good.  There is no dysphagia.  Bowel movements are normal without melena or bright red blood per rectum.  The patient has gained weight.   Note from 8/8/2023 - The patient was seen with the patient's father, who provided most of the information.  The patient has Down syndrome and celiac disease.  On her last blood work performed on January 25, 2023, celiac labs had normalized for the first time along with normalization of multiple vitamin deficiencies including copper, vitamin K, and vitamin A.  At that time ALT and alk phos were mildly elevated at 49 and 131 respectively.  The patient has been followed by Dr. Peng at [facility] for her gastric outlet obstruction.  She has had multiple endoscopies since I last saw the patient on Leelee 10, 2022.  The last EGD and EUS was performed on July 11, 2023 at which time a metal stent was placed across the gastrojejunostomy.  She also had endoscopies on July 11, 2022, December 7, 2022, and April 14, 2023.  It is not clear if small bowel biopsies were taken on any of these endoscopies.  The patient lives in a group home.  80% of her food is prepared by her father and is strictly gluten-free.  There is some question in the father's mind as to whether the facility is is careful regarding gluten exposure as they should be.  The patient has no obvious pain and denies vomiting.  She has daily solid bowel movements.  She denies melena or bright red blood per rectum.  The patient has gained 28 pounds.   Note from 6/10/2022 - The patient has Down syndrome and celiac disease.  She lives in a group home where she is reportedly on a gluten-free diet.  She is seeing me for the first time since August 11, 2021.  At that time, blood work revealed abnormal celiac markers with tissue transglutaminase IgA weakly positive at 9.1 with normal IgG and positive anti-deamidated gliadin IgA of 71.7.  The patient had borderline iron deficiency anemia, elevated alkaline phosphatase, decreased vitamin D, B12, and carotene levels.  After this visit, the patient saw my dietitian and efforts were made to ensure strict gluten-free diet at her group home.  Since then, the patient was hospitalized at [facility] from April 26 to May 2 with hematemesis and abdominal pain.  She had 3 endoscopies by Dr. Elliott Peng and received blood transfusions.  She was found to have gastric and duodenal ulcers as well as stenosis of the gastroenterostomy in the gastric body which was dilated to 18 mm.  There appeared to be a stenosis of the jejunal jejunal anastomosis.  It is unclear if small bowel biopsies were taken during these procedures.  The patient is currently on omeprazole 40 mg twice daily and Carafate twice daily as well as B12, vitamin D, and beta-carotene supplementation.  She gets pain in the abdomen bilaterally but does not vomit.  She has 4 solid bowel movements a day without melena.  She had a small amount of blood on 1 occasion which was likely related to hemorrhoids.   Note from 8/11/2021 - The patient is a 45-year-old woman with Down syndrome who was seen with her father.  She was diagnosed with celiac disease 15 years ago by Dr. Noel.  I do not have the initial studies at which time the diagnosis was made.  The patient had a duodenal stricture which required gastrojejunostomy in 1997.  This has been revised on 2 occasions the patient has had recurrent stricturing at the gastrojejunal anastomosis for which she has undergone revealed dilatations approximately every 3 months.  She has seen multiple gastroenterologists for this.  Most recently, she had a stent placed by Dr. March in March but this was removed on April 30, 2021 due to diarrhea.  At the time of that endoscopy, small bowel biopsies were taken which showed moderate villous blunting with increased inflammatory cells in the lamina propria and slightly increased intraepithelial lymphocytes.  The patient has subsequently been seen by Dr. Guadarrama at [facility], who performed an enteroscopy on July 8, 2021 with dilatation.  At that time note was made of scalloped and atrophic appearing villi in the small bowel and an area of ulceration in the gastric body.  The patient was hospitalized on July 14, 2021 for hematemesis.  Another EGD was performed on July 15, 2021 which showed a 3 mm clean-based ulceration in the beginning of the gastrojejunostomy.  The patient is currently on Zegerid 20 mg twice daily.  She was on Carafate but this has been stopped. Dr. Guadarrama has recommended endoscopic gastrojejunostomy as an alternative to recurrent dilatations.  As far as the celiac disease goes, no blood work has been done in some time.  The patient lives in a group home where they are aware of the need for a gluten-free diet but it does appear that there is significant room for contact and cross-contamination.  The patient spends a good deal of time at her father's house, where he reports a strict gluten-free diet.  The patient's mother has passed away.  She complains of right sided abdominal pain.  She has recently gained weight and is not currently vomiting although she vomits frequently as she develops obstruction.  The patient has 4 solid bowel movements a day.  She denies melena or bright red blood per rectum.

## 2024-08-28 NOTE — ASSESSMENT
[FreeTextEntry1] : Patient with Down syndrome and celiac disease.  She has evidence of osteoporosis on bone density scan.  She has an elevated alkaline phosphatase.  Her celiac serologies were still abnormal in February.  She also has gastric outlet obstruction for which a metal stent was placed in July.  Bloodwork was sent for CBC, chem-pack, TSH, celiac markers, iron studies, vitamin B12, folate, vitamin A, vitamin D, vitamin K, magnesium, carotene, vitamin B6, zinc, copper, vitamin B1, vitamin B2, selenium, vitamin B3, vitamin B5, vitamin C, alkaline phosphatase isoenzymes.  The patient was again advised to see a hepatologist regarding the chronically elevated alkaline phosphatase.  I again urged to remind Dr. Woods to please take biopsies from the small intestine during the next endoscopy.  We again discussed the importance of a strict gluten-free diet free of contact and cross-contamination.  Patient will return to see me in 6 months.   Plan from 2/22/2024 - Patient with Down syndrome and celiac disease.  Significant efforts appear to be made to maintain a strict gluten-free diet.  She is feeling well.  She had abnormal celiac serologies back in August.  Bloodwork was sent for CBC, chem-pack, TSH, celiac markers, iron studies, vitamin B12, folate, vitamin A, vitamin D, vitamin K, magnesium, carotene, vitamin B6, zinc, copper, vitamin B1, vitamin B2, selenium, vitamin B3, vitamin B5, vitamin C.  Patient was sent for a bone density scan.  She recently fractured bones in her foot.  The patient undergoes endoscopy every 6 months by Dr. Elliott Woods.  No small bowel biopsies were taken.  I have asked the patient's father to make sure that Dr. Woods takes biopsies to evaluate the small bowel for celiac disease findings.  The patient is seen by endocrinology and thyroid medication has been adjusted.  We discussed the possibility of a screening colonoscopy.  We will consider this at the next visit.   Plan from 8/8/2023 - Patient with Down syndrome and celiac disease who had normalization of her celiac serologies in January. She had mildly elevated liver tests. She is doing well although there is some question as to whether or not she is getting gluten exposure in her group home. The patient appears to be feeling well.  Bloodwork was sent for CBC, chem-pack, TSH, celiac markers, iron studies, vitamin B12, folate, vitamin A, vitamin D, vitamin K, magnesium, carotene, vitamin B6, zinc, copper, vitamin B1, vitamin B2, selenium, vitamin B3, vitamin B5, vitamin C.  If the liver tests are still abnormal, I will send the patient for an abdominal ultrasound. We would also refer the patient for hepatology evaluation.  I have asked the patient's father to ask Dr. Peng to take multiple biopsies from the small bowel during the patient's next endoscopy in order to assess the patient's celiac disease. I am also sending this letter to Dr. Peng to convey this request directly.  The patient must remain on a strict gluten-free diet free of contact and cross-contamination.   Plan from 6/10/2022 - Patient with Down syndrome and celiac disease who had mildly abnormal celiac serologies last summer. She is status post hospitalization for GI bleed with gastric and duodenal ulcers. She has a complicated history with multiple gastric surgeries and dilatations of strictures. She had an elevated alkaline phosphatase last summer. After the last visit, my dietitian and I made significant efforts to assure that the patient was provided with a gluten-free environment at her group home.  Bloodwork was sent for CBC, chem-pack, TSH, celiac markers, iron studies, B12, folate, vitamin A, vitamin D, vitamin K, magnesium, carotene, vitamin B6, zinc, copper, vitamin B1, vitamin B2. PT, alpha-1 antitrypsin, alpha-fetoprotein, KAVON, ANCA, ceruloplasmin, iron studies, GGTP, celiac markers, hepatitis A., B., C. serologies, lipid profile, AMA, anti-smooth muscle antibody, anti-LKM antibody, anti-soluble liver antigen antibody, and alkaline phosphatase isoenzymes.  I am deferring endoscopic evaluations to Dr. Peng. I have asked the patient to get me any biopsy results from the previous endoscopies and to ask that multiple small bowel biopsies be taken at the time of future endoscopy.   Plan from 8/11/2021 - Patient with Down syndrome and a diagnosis of celiac disease made 15 years ago. Recent pathology showed moderate villous blunting with slightly increased intraepithelial lymphocytes and increased inflammatory cells in the lamina propria. Although efforts are made for the patient to be on a strict gluten-free diet, there does appear to be significant risk of contact and cross-contamination at the patient's group home.She has had multiple gastric surgeries and repeated dilatations of a gastrojejunostomy stricture.  I had a long discussion with the patient and her father regarding celiac disease, the gluten-free diet, the concepts of contact and cross contamination, and the potential complications of celiac disease. We also discussed the need for ongoing followup.  Bloodwork was sent for CBC, chem-pack, TSH, celiac markers, iron studies, B12, folate, vitamin A, vitamin D, vitamin K, magnesium, carotene, vitamin B6, zinc, and celiac HLA testing.  Patient was sent for a bone density scan.  I have provided the patient with Zegerid 20 mg twice daily and Carafate 10 cc 4 times daily given the recent finding of a small ulcer at the surgical anastomosis.  I explained to the patient that I do not have expertise when it comes to dilatation and stenting or advanced endoscopy. I deferred management decisions and intervention regarding this to Dr. Guadarrama.

## 2024-09-08 LAB
25(OH)D3 SERPL-MCNC: 80.1 NG/ML
ALBUMIN SERPL ELPH-MCNC: 4 G/DL
ALP BLD-CCNC: 142 IU/L
ALP BLD-CCNC: 145 U/L
ALP BONE CFR SERPL: 36 %
ALP INTEST CFR SERPL: 17 %
ALP LIVER CFR SERPL: 47 %
ALT SERPL-CCNC: 27 U/L
ANION GAP SERPL CALC-SCNC: 12 MMOL/L
AST SERPL-CCNC: 30 U/L
BASOPHILS # BLD AUTO: 0.05 K/UL
BASOPHILS NFR BLD AUTO: 1.5 %
BETA CAROTENE: 3 UG/DL
BILIRUB SERPL-MCNC: 0.8 MG/DL
BUN SERPL-MCNC: 17 MG/DL
CALCIUM SERPL-MCNC: 9 MG/DL
CHLORIDE SERPL-SCNC: 106 MMOL/L
CO2 SERPL-SCNC: 25 MMOL/L
COPPER SERPL-MCNC: 68 UG/DL
CREAT SERPL-MCNC: 0.72 MG/DL
EGFR: 103 ML/MIN/1.73M2
ENDOMYSIUM IGA SER QL: NEGATIVE
ENDOMYSIUM IGA TITR SER: NORMAL
EOSINOPHIL # BLD AUTO: 0.08 K/UL
EOSINOPHIL NFR BLD AUTO: 2.5 %
FERRITIN SERPL-MCNC: 65 NG/ML
FOLATE SERPL-MCNC: >20 NG/ML
GGT SERPL-CCNC: 9 U/L
GLIADIN IGA SER QL: 6.7 U/ML
GLIADIN IGG SER QL: 0.5 U/ML
GLIADIN PEPTIDE IGA SER-ACNC: NEGATIVE
GLIADIN PEPTIDE IGG SER-ACNC: NEGATIVE
GLUCOSE SERPL-MCNC: 122 MG/DL
HCT VFR BLD CALC: 41.2 %
HGB BLD-MCNC: 13.6 G/DL
IGA SER QL IEP: 643 MG/DL
IMM GRANULOCYTES NFR BLD AUTO: 0.3 %
IRON SATN MFR SERPL: 13 %
IRON SERPL-MCNC: 41 UG/DL
LYMPHOCYTES # BLD AUTO: 0.9 K/UL
LYMPHOCYTES NFR BLD AUTO: 27.9 %
MAGNESIUM SERPL-MCNC: 2 MG/DL
MAN DIFF?: NORMAL
MCHC RBC-ENTMCNC: 33 GM/DL
MCHC RBC-ENTMCNC: 33.7 PG
MCV RBC AUTO: 102 FL
MENADIONE SERPL-MCNC: 0.26 NG/ML
MONOCYTES # BLD AUTO: 0.41 K/UL
MONOCYTES NFR BLD AUTO: 12.7 %
NEUTROPHILS # BLD AUTO: 1.78 K/UL
NEUTROPHILS NFR BLD AUTO: 55.1 %
PANTOTHENATE SERPLBLD-MCNC: 242.7 NG/ML
PLATELET # BLD AUTO: 236 K/UL
POTASSIUM SERPL-SCNC: 4 MMOL/L
PROT SERPL-MCNC: 7 G/DL
RBC # BLD: 4.04 M/UL
RBC # FLD: 14.7 %
SELENIUM SERPL-MCNC: 122 UG/L
SODIUM SERPL-SCNC: 143 MMOL/L
TIBC SERPL-MCNC: 310 UG/DL
TSH SERPL-ACNC: <0.01 UIU/ML
TTG IGA SER IA-ACNC: 3.5 U/ML
TTG IGA SER-ACNC: NEGATIVE
TTG IGG SER IA-ACNC: <0.8 U/ML
TTG IGG SER IA-ACNC: NEGATIVE
UIBC SERPL-MCNC: 269 UG/DL
VIT A SERPL-MCNC: 45.4 UG/DL
VIT B1 SERPL-MCNC: 168 NMOL/L
VIT B12 SERPL-MCNC: 609 PG/ML
VIT B2 SERPL-MCNC: 331 UG/L
VIT B6 SERPL-MCNC: 78 UG/L
VIT C SERPL-MCNC: 1.3 MG/DL
WBC # FLD AUTO: 3.23 K/UL
ZINC SERPL-MCNC: 57 UG/DL

## (undated) DEVICE — VAGINAL PACKING 2 X 6"

## (undated) DEVICE — SPECIMEN CONTAINER 100ML

## (undated) DEVICE — DRAPE 3/4 SHEET W REINFORCEMENT 56X77"

## (undated) DEVICE — VISITEC 4X4

## (undated) DEVICE — ELCTR BOVIE PENCIL SMOKE EVACUATION

## (undated) DEVICE — DRAPE INSTRUMENT POUCH 6.75" X 11"

## (undated) DEVICE — GLV 7 PROTEXIS (WHITE)

## (undated) DEVICE — SOL IRR POUR NS 0.9% 500ML

## (undated) DEVICE — TONSIL ROLLS

## (undated) DEVICE — VENODYNE/SCD SLEEVE CALF MEDIUM

## (undated) DEVICE — POSITIONER FOAM EGG CRATE ULNAR 2PCS (PINK)

## (undated) DEVICE — DRAPE MAYO STAND 30"

## (undated) DEVICE — SOL IRR POUR H2O 250ML

## (undated) DEVICE — TUBING SUCTION 20FT

## (undated) DEVICE — SUCTION YANKAUER OPEN TIP NO VENT CURVE

## (undated) DEVICE — LAP PAD 18 X 18"

## (undated) DEVICE — MEDICATION LABELS W MARKER

## (undated) DEVICE — WARMING BLANKET LOWER ADULT

## (undated) DEVICE — DRAPE TOWEL BLUE STICKY

## (undated) DEVICE — PACK BASIC GOWN

## (undated) DEVICE — GLV 6.5 PROTEXIS (WHITE)

## (undated) DEVICE — GLV 7.5 PROTEXIS (WHITE)

## (undated) DEVICE — GOWN TRIMAX LG

## (undated) DEVICE — DRAPE LIGHT HANDLE COVER (BLUE)